# Patient Record
Sex: FEMALE | Race: WHITE | Employment: FULL TIME | URBAN - METROPOLITAN AREA
[De-identification: names, ages, dates, MRNs, and addresses within clinical notes are randomized per-mention and may not be internally consistent; named-entity substitution may affect disease eponyms.]

---

## 2017-03-23 DIAGNOSIS — J01.00 ACUTE MAXILLARY SINUSITIS, RECURRENCE NOT SPECIFIED: ICD-10-CM

## 2017-03-24 RX ORDER — FLUTICASONE PROPIONATE 50 MCG
SPRAY, SUSPENSION (ML) NASAL
Qty: 16 G | Refills: 5 | Status: SHIPPED | OUTPATIENT
Start: 2017-03-24 | End: 2017-12-26 | Stop reason: SDUPTHER

## 2017-05-02 ENCOUNTER — OFFICE VISIT (OUTPATIENT)
Dept: FAMILY MEDICINE CLINIC | Age: 42
End: 2017-05-02

## 2017-05-02 VITALS
DIASTOLIC BLOOD PRESSURE: 70 MMHG | RESPIRATION RATE: 16 BRPM | HEART RATE: 80 BPM | HEIGHT: 63 IN | SYSTOLIC BLOOD PRESSURE: 112 MMHG | BODY MASS INDEX: 29.84 KG/M2 | WEIGHT: 168.4 LBS

## 2017-05-02 DIAGNOSIS — J45.20 MILD INTERMITTENT ASTHMA WITHOUT COMPLICATION: ICD-10-CM

## 2017-05-02 DIAGNOSIS — J30.1 SEASONAL ALLERGIC RHINITIS DUE TO POLLEN: ICD-10-CM

## 2017-05-02 DIAGNOSIS — F41.0 PANIC ATTACKS: ICD-10-CM

## 2017-05-02 DIAGNOSIS — L30.9 ECZEMA, UNSPECIFIED TYPE: Primary | ICD-10-CM

## 2017-05-02 PROCEDURE — 99213 OFFICE O/P EST LOW 20 MIN: CPT | Performed by: FAMILY MEDICINE

## 2017-05-02 RX ORDER — CLOBETASOL PROPIONATE 0.5 MG/G
CREAM TOPICAL
Qty: 30 G | Refills: 1 | Status: SHIPPED | OUTPATIENT
Start: 2017-05-02 | End: 2018-05-21 | Stop reason: ALTCHOICE

## 2017-05-02 ASSESSMENT — ENCOUNTER SYMPTOMS
COUGH: 0
ABDOMINAL PAIN: 0
CHEST TIGHTNESS: 0
WHEEZING: 0
NAUSEA: 0
CONSTIPATION: 0
SORE THROAT: 0
SHORTNESS OF BREATH: 0
EYE PAIN: 0

## 2017-08-16 ENCOUNTER — TELEPHONE (OUTPATIENT)
Dept: FAMILY MEDICINE CLINIC | Age: 42
End: 2017-08-16

## 2017-08-16 RX ORDER — AZITHROMYCIN 250 MG/1
TABLET, FILM COATED ORAL
Qty: 1 PACKET | Refills: 0 | Status: SHIPPED | OUTPATIENT
Start: 2017-08-16 | End: 2017-08-26

## 2017-10-13 ENCOUNTER — NURSE ONLY (OUTPATIENT)
Dept: FAMILY MEDICINE CLINIC | Age: 42
End: 2017-10-13

## 2017-10-13 DIAGNOSIS — Z23 NEED FOR IMMUNIZATION AGAINST INFLUENZA: Primary | ICD-10-CM

## 2017-10-13 PROCEDURE — 90674 CCIIV4 VAC NO PRSV 0.5 ML IM: CPT | Performed by: FAMILY MEDICINE

## 2017-10-13 PROCEDURE — 90471 IMMUNIZATION ADMIN: CPT | Performed by: FAMILY MEDICINE

## 2017-10-25 ENCOUNTER — OFFICE VISIT (OUTPATIENT)
Dept: FAMILY MEDICINE CLINIC | Age: 42
End: 2017-10-25

## 2017-10-25 VITALS
SYSTOLIC BLOOD PRESSURE: 122 MMHG | HEIGHT: 63 IN | HEART RATE: 70 BPM | RESPIRATION RATE: 14 BRPM | DIASTOLIC BLOOD PRESSURE: 72 MMHG | WEIGHT: 176 LBS | BODY MASS INDEX: 31.18 KG/M2

## 2017-10-25 DIAGNOSIS — J01.00 ACUTE NON-RECURRENT MAXILLARY SINUSITIS: Primary | ICD-10-CM

## 2017-10-25 DIAGNOSIS — F41.0 PANIC ATTACKS: ICD-10-CM

## 2017-10-25 DIAGNOSIS — R35.0 URINARY FREQUENCY: ICD-10-CM

## 2017-10-25 DIAGNOSIS — N30.00 ACUTE CYSTITIS WITHOUT HEMATURIA: ICD-10-CM

## 2017-10-25 LAB
BACTERIA URINE, POC: NORMAL
BILIRUBIN URINE: 0 MG/DL
BLOOD, URINE: NEGATIVE
CASTS URINE, POC: NORMAL
CLARITY: CLEAR
COLOR: YELLOW
CRYSTALS URINE, POC: NORMAL
EPI CELLS URINE, POC: NORMAL
GLUCOSE URINE: NEGATIVE
KETONES, URINE: NEGATIVE
LEUKOCYTE EST, POC: NEGATIVE
NITRITE, URINE: NEGATIVE
PH UA: 6 (ref 4.5–8)
PROTEIN UA: NEGATIVE
RBC URINE, POC: NORMAL
SPECIFIC GRAVITY UA: 1.01 (ref 1–1.03)
UROBILINOGEN, URINE: NORMAL
WBC URINE, POC: NORMAL
YEAST URINE, POC: NORMAL

## 2017-10-25 PROCEDURE — 99213 OFFICE O/P EST LOW 20 MIN: CPT | Performed by: FAMILY MEDICINE

## 2017-10-25 PROCEDURE — 81000 URINALYSIS NONAUTO W/SCOPE: CPT | Performed by: FAMILY MEDICINE

## 2017-10-25 RX ORDER — ALPRAZOLAM 0.5 MG/1
TABLET ORAL
Qty: 10 TABLET | Refills: 0 | Status: SHIPPED | OUTPATIENT
Start: 2017-10-25 | End: 2018-11-20 | Stop reason: SDUPTHER

## 2017-10-25 RX ORDER — AMOXICILLIN AND CLAVULANATE POTASSIUM 875; 125 MG/1; MG/1
1 TABLET, FILM COATED ORAL 2 TIMES DAILY
Qty: 20 TABLET | Refills: 0 | Status: SHIPPED | OUTPATIENT
Start: 2017-10-25 | End: 2020-02-03 | Stop reason: SDUPTHER

## 2017-10-25 ASSESSMENT — ENCOUNTER SYMPTOMS
COUGH: 0
NAUSEA: 0
WHEEZING: 0
CONSTIPATION: 0
SINUS PRESSURE: 1
CHEST TIGHTNESS: 0
ABDOMINAL PAIN: 0
SORE THROAT: 0
EYE PAIN: 0
SHORTNESS OF BREATH: 0

## 2017-10-25 ASSESSMENT — PATIENT HEALTH QUESTIONNAIRE - PHQ9
SUM OF ALL RESPONSES TO PHQ9 QUESTIONS 1 & 2: 0
1. LITTLE INTEREST OR PLEASURE IN DOING THINGS: 0
SUM OF ALL RESPONSES TO PHQ QUESTIONS 1-9: 0
2. FEELING DOWN, DEPRESSED OR HOPELESS: 0

## 2017-10-25 NOTE — PROGRESS NOTES
Subjective:      Patient ID: Lidya Walters is a 36 y.o. female. uti   Of  ? And  Had  Leukocytes       panic  Attacks   stable       Sinusitis   This is a new problem. The current episode started in the past 7 days. There has been no fever. Associated symptoms include congestion and sinus pressure. Pertinent negatives include no coughing, ear pain, headaches, neck pain, shortness of breath or sore throat. (   Right  Max  Area  With pain) Past treatments include oral decongestants. The treatment provided mild relief. Past Medical History:   Diagnosis Date    Asthma     Panic attacks 4/98    Seasonal allergies 4/99     Review of Systems   Constitutional: Negative for appetite change, fatigue and fever. HENT: Positive for congestion and sinus pressure. Negative for ear pain, postnasal drip and sore throat. Eyes: Negative for pain and visual disturbance. Respiratory: Negative for cough, chest tightness, shortness of breath and wheezing. Cardiovascular: Negative for chest pain, palpitations and leg swelling. Gastrointestinal: Negative for abdominal pain, constipation and nausea. Genitourinary: Negative for dysuria and frequency. Musculoskeletal: Negative for arthralgias, joint swelling, neck pain and neck stiffness. Skin: Negative for rash. Neurological: Negative for dizziness, weakness, numbness and headaches. Hematological: Negative for adenopathy. Does not bruise/bleed easily. Psychiatric/Behavioral: Negative for behavioral problems and sleep disturbance. The patient is not nervous/anxious. /72 (Site: Right Arm, Position: Sitting, Cuff Size: Medium Adult)   Pulse 70   Resp 14   Ht 5' 3\" (1.6 m)   Wt 176 lb (79.8 kg)   Breastfeeding? No   BMI 31.18 kg/m²   Objective:   Physical Exam   Constitutional: She is oriented to person, place, and time. She appears well-developed and well-nourished. HENT:   Head: Normocephalic and atraumatic.    Right Ear: External ear Dizziness 30 tablet 1    Loratadine (CLARITIN) 10 MG CAPS Take  by mouth daily.  Pseudoephedrine HCl (SUDAFED 12 HOUR PO) Take  by mouth. No current facility-administered medications for this visit.             See if  Persist and  Urine     Will  Cover and  try  mucinex         See in  6 mths    Try  zoloft   25 mg    For  Panic  Attacks and  Xanax  prn

## 2017-11-28 DIAGNOSIS — F41.0 PANIC ATTACKS: ICD-10-CM

## 2017-11-29 NOTE — TELEPHONE ENCOUNTER
Date of last visit:  10/25/2017  Date of next visit:  Visit date not found    Requested Prescriptions     Pending Prescriptions Disp Refills    sertraline (ZOLOFT) 50 MG tablet [Pharmacy Med Name: SERTRALINE 50MG TAB] 90 tablet 1     Sig: TAKE ONE TABLET BY MOUTH ONCE DAILY

## 2017-12-26 ENCOUNTER — HOSPITAL ENCOUNTER (OUTPATIENT)
Dept: WOMENS IMAGING | Age: 42
Discharge: HOME | End: 2017-12-26
Payer: COMMERCIAL

## 2017-12-26 DIAGNOSIS — J01.00 ACUTE MAXILLARY SINUSITIS, RECURRENCE NOT SPECIFIED: ICD-10-CM

## 2017-12-26 DIAGNOSIS — Z12.31 VISIT FOR SCREENING MAMMOGRAM: ICD-10-CM

## 2017-12-26 PROCEDURE — G0202 SCR MAMMO BI INCL CAD: HCPCS

## 2017-12-26 NOTE — TELEPHONE ENCOUNTER
From: Lorenzo Quintero  Sent: 12/25/2017 8:49 PM EST  Subject: Medication Renewal Request    Lashae Masterson would like a refill of the following medications:  fluticasone (FLONASE) 50 MCG/ACT nasal spray Jerry Flaherty MD]    Preferred pharmacy: [facility] [address], [address] [phone] - F [phone]    Comment:

## 2017-12-27 RX ORDER — FLUTICASONE PROPIONATE 50 MCG
SPRAY, SUSPENSION (ML) NASAL
Qty: 16 G | Refills: 5 | Status: SHIPPED | OUTPATIENT
Start: 2017-12-27 | End: 2018-11-20 | Stop reason: SDUPTHER

## 2018-05-21 ENCOUNTER — OFFICE VISIT (OUTPATIENT)
Dept: FAMILY MEDICINE CLINIC | Age: 43
End: 2018-05-21

## 2018-05-21 VITALS
WEIGHT: 165 LBS | SYSTOLIC BLOOD PRESSURE: 120 MMHG | RESPIRATION RATE: 14 BRPM | HEIGHT: 63 IN | DIASTOLIC BLOOD PRESSURE: 70 MMHG | BODY MASS INDEX: 29.23 KG/M2 | HEART RATE: 68 BPM

## 2018-05-21 DIAGNOSIS — J01.21 ACUTE RECURRENT ETHMOIDAL SINUSITIS: Primary | ICD-10-CM

## 2018-05-21 PROCEDURE — 99213 OFFICE O/P EST LOW 20 MIN: CPT | Performed by: FAMILY MEDICINE

## 2018-05-21 RX ORDER — AMOXICILLIN AND CLAVULANATE POTASSIUM 875; 125 MG/1; MG/1
1 TABLET, FILM COATED ORAL 2 TIMES DAILY
Qty: 20 TABLET | Refills: 0 | Status: SHIPPED | OUTPATIENT
Start: 2018-05-21 | End: 2018-06-01 | Stop reason: SDUPTHER

## 2018-05-21 ASSESSMENT — ENCOUNTER SYMPTOMS
CONSTIPATION: 0
SINUS PRESSURE: 1
ABDOMINAL PAIN: 0
EYE PAIN: 0
HOARSE VOICE: 1
NAUSEA: 0
SORE THROAT: 1
CHEST TIGHTNESS: 0
COUGH: 1
SHORTNESS OF BREATH: 0
WHEEZING: 0

## 2018-06-01 ENCOUNTER — TELEPHONE (OUTPATIENT)
Dept: FAMILY MEDICINE CLINIC | Age: 43
End: 2018-06-01

## 2018-06-01 DIAGNOSIS — J01.21 ACUTE RECURRENT ETHMOIDAL SINUSITIS: ICD-10-CM

## 2018-06-01 RX ORDER — AMOXICILLIN AND CLAVULANATE POTASSIUM 875; 125 MG/1; MG/1
1 TABLET, FILM COATED ORAL 2 TIMES DAILY
Qty: 14 TABLET | Refills: 0 | Status: SHIPPED | OUTPATIENT
Start: 2018-06-01 | End: 2018-06-08

## 2018-08-15 DIAGNOSIS — F41.0 PANIC ATTACKS: ICD-10-CM

## 2018-11-20 ENCOUNTER — OFFICE VISIT (OUTPATIENT)
Dept: FAMILY MEDICINE CLINIC | Age: 43
End: 2018-11-20

## 2018-11-20 VITALS
SYSTOLIC BLOOD PRESSURE: 118 MMHG | HEART RATE: 72 BPM | RESPIRATION RATE: 12 BRPM | HEIGHT: 63 IN | WEIGHT: 179.8 LBS | DIASTOLIC BLOOD PRESSURE: 74 MMHG | BODY MASS INDEX: 31.86 KG/M2

## 2018-11-20 DIAGNOSIS — J01.00 ACUTE MAXILLARY SINUSITIS, RECURRENCE NOT SPECIFIED: ICD-10-CM

## 2018-11-20 DIAGNOSIS — F41.0 PANIC ATTACKS: ICD-10-CM

## 2018-11-20 DIAGNOSIS — J30.2 SEASONAL ALLERGIES: Primary | ICD-10-CM

## 2018-11-20 PROCEDURE — 99213 OFFICE O/P EST LOW 20 MIN: CPT | Performed by: FAMILY MEDICINE

## 2018-11-20 RX ORDER — FLUOXETINE 10 MG/1
10 CAPSULE ORAL DAILY
Qty: 30 CAPSULE | Refills: 1 | Status: SHIPPED | OUTPATIENT
Start: 2018-11-20 | End: 2019-04-08 | Stop reason: SDUPTHER

## 2018-11-20 RX ORDER — PREDNISONE 20 MG/1
TABLET ORAL
Qty: 21 TABLET | Refills: 0 | Status: SHIPPED | OUTPATIENT
Start: 2018-11-20 | End: 2019-05-20 | Stop reason: ALTCHOICE

## 2018-11-20 RX ORDER — FLUTICASONE PROPIONATE 50 MCG
SPRAY, SUSPENSION (ML) NASAL
Qty: 16 G | Refills: 5 | Status: SHIPPED | OUTPATIENT
Start: 2018-11-20 | End: 2019-11-27 | Stop reason: SDUPTHER

## 2018-11-20 RX ORDER — ALPRAZOLAM 0.5 MG/1
TABLET ORAL
Qty: 10 TABLET | Refills: 0 | Status: SHIPPED | OUTPATIENT
Start: 2018-11-20 | End: 2019-11-19 | Stop reason: SDUPTHER

## 2018-11-20 ASSESSMENT — ENCOUNTER SYMPTOMS
CHEST TIGHTNESS: 0
ABDOMINAL PAIN: 0
COUGH: 1
SORE THROAT: 1
SINUS PRESSURE: 1
EYE PAIN: 0
WHEEZING: 0
SHORTNESS OF BREATH: 0
CONSTIPATION: 0
NAUSEA: 0

## 2018-11-20 ASSESSMENT — PATIENT HEALTH QUESTIONNAIRE - PHQ9
2. FEELING DOWN, DEPRESSED OR HOPELESS: 0
1. LITTLE INTEREST OR PLEASURE IN DOING THINGS: 0
SUM OF ALL RESPONSES TO PHQ QUESTIONS 1-9: 0
SUM OF ALL RESPONSES TO PHQ QUESTIONS 1-9: 0
SUM OF ALL RESPONSES TO PHQ9 QUESTIONS 1 & 2: 0

## 2019-01-04 DIAGNOSIS — F41.0 PANIC ATTACKS: ICD-10-CM

## 2019-03-29 ENCOUNTER — HOSPITAL ENCOUNTER (OUTPATIENT)
Dept: WOMENS IMAGING | Age: 44
Discharge: HOME | End: 2019-03-29
Payer: COMMERCIAL

## 2019-03-29 DIAGNOSIS — Z12.31 VISIT FOR SCREENING MAMMOGRAM: ICD-10-CM

## 2019-03-29 PROCEDURE — 77063 BREAST TOMOSYNTHESIS BI: CPT

## 2019-04-08 DIAGNOSIS — F41.0 PANIC ATTACKS: ICD-10-CM

## 2019-04-08 NOTE — TELEPHONE ENCOUNTER
Date of last visit:  11/20/2018  Date of next visit:  Visit date not found    Requested Prescriptions     Pending Prescriptions Disp Refills    FLUoxetine (PROZAC) 10 MG capsule [Pharmacy Med Name: FLUoxetine HCl Oral Capsule 10 MG] 30 capsule 0     Sig: TAKE 1 CAPSULE BY MOUTH ONE TIME A DAY

## 2019-04-09 RX ORDER — FLUOXETINE 10 MG/1
CAPSULE ORAL
Qty: 30 CAPSULE | Refills: 2 | Status: SHIPPED | OUTPATIENT
Start: 2019-04-09 | End: 2019-05-20 | Stop reason: ALTCHOICE

## 2019-05-20 ENCOUNTER — OFFICE VISIT (OUTPATIENT)
Dept: FAMILY MEDICINE CLINIC | Age: 44
End: 2019-05-20

## 2019-05-20 VITALS
BODY MASS INDEX: 32.43 KG/M2 | HEART RATE: 76 BPM | SYSTOLIC BLOOD PRESSURE: 112 MMHG | WEIGHT: 183 LBS | DIASTOLIC BLOOD PRESSURE: 74 MMHG | HEIGHT: 63 IN | RESPIRATION RATE: 14 BRPM

## 2019-05-20 DIAGNOSIS — J01.20 ACUTE NON-RECURRENT ETHMOIDAL SINUSITIS: Primary | ICD-10-CM

## 2019-05-20 DIAGNOSIS — F41.0 PANIC ATTACKS: ICD-10-CM

## 2019-05-20 DIAGNOSIS — R42 VERTIGO: ICD-10-CM

## 2019-05-20 PROCEDURE — 99213 OFFICE O/P EST LOW 20 MIN: CPT | Performed by: FAMILY MEDICINE

## 2019-05-20 RX ORDER — AMOXICILLIN AND CLAVULANATE POTASSIUM 875; 125 MG/1; MG/1
1 TABLET, FILM COATED ORAL 2 TIMES DAILY
Qty: 20 TABLET | Refills: 0 | Status: SHIPPED | OUTPATIENT
Start: 2019-05-20 | End: 2019-05-30

## 2019-05-20 RX ORDER — MECLIZINE HYDROCHLORIDE 25 MG/1
25 TABLET ORAL 4 TIMES DAILY PRN
Qty: 30 TABLET | Refills: 1 | Status: SHIPPED | OUTPATIENT
Start: 2019-05-20 | End: 2020-08-26 | Stop reason: SDUPTHER

## 2019-05-20 RX ORDER — ESCITALOPRAM OXALATE 10 MG/1
5 TABLET ORAL DAILY
Qty: 15 TABLET | Refills: 3 | Status: SHIPPED | OUTPATIENT
Start: 2019-05-20 | End: 2019-07-31 | Stop reason: ALTCHOICE

## 2019-05-20 ASSESSMENT — ENCOUNTER SYMPTOMS
CONSTIPATION: 0
NAUSEA: 0
CHEST TIGHTNESS: 0
WHEEZING: 0
EYE PAIN: 0
SHORTNESS OF BREATH: 0
COUGH: 1
SINUS PRESSURE: 1
ABDOMINAL PAIN: 0
SORE THROAT: 1

## 2019-05-20 ASSESSMENT — PATIENT HEALTH QUESTIONNAIRE - PHQ9
2. FEELING DOWN, DEPRESSED OR HOPELESS: 0
1. LITTLE INTEREST OR PLEASURE IN DOING THINGS: 0
SUM OF ALL RESPONSES TO PHQ QUESTIONS 1-9: 0
SUM OF ALL RESPONSES TO PHQ9 QUESTIONS 1 & 2: 0
SUM OF ALL RESPONSES TO PHQ QUESTIONS 1-9: 0

## 2019-05-20 NOTE — PROGRESS NOTES
Subjective:      Patient ID: Anatoly Babb is a 38 y.o. female. Back  To the  zoloft and  prozac  Noted   josie e  Worse  ?  lexapro     Sinusitis   This is a new problem. The current episode started in the past 7 days. The problem has been gradually worsening since onset. There has been no fever. The fever has been present for 3 to 4 days. Associated symptoms include congestion, coughing, ear pain, headaches, sinus pressure and a sore throat. Pertinent negatives include no neck pain or shortness of breath. Past Medical History:   Diagnosis Date    Asthma     Panic attacks 4/98    Seasonal allergies 4/99     Review of Systems   Constitutional: Negative for appetite change, fatigue and fever. HENT: Positive for congestion, ear pain, sinus pressure and sore throat. Negative for postnasal drip. Eyes: Negative for pain and visual disturbance. Respiratory: Positive for cough. Negative for chest tightness, shortness of breath and wheezing. Cardiovascular: Negative for chest pain, palpitations and leg swelling. Gastrointestinal: Negative for abdominal pain, constipation and nausea. Genitourinary: Negative for dysuria and frequency. Musculoskeletal: Negative for arthralgias, joint swelling, neck pain and neck stiffness. Skin: Negative for rash. Neurological: Positive for headaches. Negative for dizziness, weakness and numbness. Hematological: Negative for adenopathy. Does not bruise/bleed easily. Psychiatric/Behavioral: Negative for behavioral problems and sleep disturbance. The patient is not nervous/anxious. /74 (Site: Right Upper Arm, Position: Sitting, Cuff Size: Medium Adult)   Pulse 76   Resp 14   Ht 5' 3\" (1.6 m)   Wt 183 lb (83 kg)   BMI 32.42 kg/m²   Objective:   Physical Exam   Constitutional: She is oriented to person, place, and time. She appears well-developed and well-nourished. HENT:   Head: Normocephalic and atraumatic.    Right Ear: External ear normal.   Left Ear: External ear normal.   Nose: Nose normal.   Mouth/Throat: Oropharynx is clear and moist.   Eyes: Pupils are equal, round, and reactive to light. Conjunctivae and EOM are normal. No scleral icterus. Neck: Normal range of motion. Neck supple. No JVD present. No thyromegaly present. Cardiovascular: Normal rate, regular rhythm, normal heart sounds and intact distal pulses. Pulmonary/Chest: Effort normal and breath sounds normal. She has no wheezes. She has no rales. Abdominal: Soft. Bowel sounds are normal. She exhibits no distension and no mass. There is no tenderness. Musculoskeletal: Normal range of motion. She exhibits no tenderness. Lymphadenopathy:     She has no cervical adenopathy. Neurological: She is alert and oriented to person, place, and time. She has normal reflexes. No cranial nerve deficit. Skin: Skin is warm and dry. No rash noted. Psychiatric: She has a normal mood and affect. Nursing note and vitals reviewed. Assessment:           Diagnosis Orders   1. Acute non-recurrent ethmoidal sinusitis  amoxicillin-clavulanate (AUGMENTIN) 875-125 MG per tablet   2. Panic attacks  escitalopram (LEXAPRO) 10 MG tablet      Plan:      Current Outpatient Medications   Medication Sig Dispense Refill    amoxicillin-clavulanate (AUGMENTIN) 875-125 MG per tablet Take 1 tablet by mouth 2 times daily for 10 days 20 tablet 0    escitalopram (LEXAPRO) 10 MG tablet Take 0.5 tablets by mouth daily 15 tablet 3    fluticasone (FLONASE) 50 MCG/ACT nasal spray USE ONE SPRAY(S) IN EACH NOSTRIL ONCE DAILY 16 g 5    meclizine (ANTIVERT) 25 MG tablet Take 1 tablet by mouth 4 times daily as needed for Dizziness 30 tablet 1    Loratadine (CLARITIN) 10 MG CAPS Take  by mouth daily.  Pseudoephedrine HCl (SUDAFED 12 HOUR PO) Take  by mouth. No current facility-administered medications for this visit. No orders of the defined types were placed in this encounter.     See in 3 mtha  And    Try lexapro  5 mg     Aletha Solomon MD

## 2019-07-30 DIAGNOSIS — F41.0 PANIC ATTACKS: ICD-10-CM

## 2019-10-15 ENCOUNTER — OFFICE VISIT (OUTPATIENT)
Dept: FAMILY MEDICINE CLINIC | Age: 44
End: 2019-10-15

## 2019-10-15 VITALS
HEART RATE: 76 BPM | HEIGHT: 63 IN | SYSTOLIC BLOOD PRESSURE: 136 MMHG | WEIGHT: 183.25 LBS | BODY MASS INDEX: 32.47 KG/M2 | DIASTOLIC BLOOD PRESSURE: 74 MMHG | RESPIRATION RATE: 14 BRPM

## 2019-10-15 DIAGNOSIS — J01.20 ACUTE NON-RECURRENT ETHMOIDAL SINUSITIS: Primary | ICD-10-CM

## 2019-10-15 PROCEDURE — 99213 OFFICE O/P EST LOW 20 MIN: CPT | Performed by: FAMILY MEDICINE

## 2019-10-15 RX ORDER — CETIRIZINE HYDROCHLORIDE 10 MG/1
10 TABLET ORAL DAILY
COMMUNITY
End: 2020-11-04

## 2019-10-15 RX ORDER — AMOXICILLIN AND CLAVULANATE POTASSIUM 875; 125 MG/1; MG/1
1 TABLET, FILM COATED ORAL 2 TIMES DAILY
Qty: 30 TABLET | Refills: 0 | Status: SHIPPED | OUTPATIENT
Start: 2019-10-15 | End: 2019-10-30

## 2019-10-15 ASSESSMENT — ENCOUNTER SYMPTOMS
SORE THROAT: 0
ABDOMINAL PAIN: 0
COUGH: 1
CONSTIPATION: 0
WHEEZING: 0
HOARSE VOICE: 1
EYE PAIN: 0
CHEST TIGHTNESS: 0
NAUSEA: 0
SHORTNESS OF BREATH: 0
SINUS PRESSURE: 1

## 2019-10-29 ENCOUNTER — NURSE ONLY (OUTPATIENT)
Dept: FAMILY MEDICINE CLINIC | Age: 44
End: 2019-10-29

## 2019-10-29 DIAGNOSIS — Z23 NEED FOR INFLUENZA VACCINATION: Primary | ICD-10-CM

## 2019-10-29 PROCEDURE — 90756 CCIIV4 VACC ABX FREE IM: CPT | Performed by: FAMILY MEDICINE

## 2019-10-29 PROCEDURE — 90471 IMMUNIZATION ADMIN: CPT | Performed by: FAMILY MEDICINE

## 2019-11-11 DIAGNOSIS — F41.0 PANIC ATTACKS: ICD-10-CM

## 2019-11-18 ENCOUNTER — PATIENT MESSAGE (OUTPATIENT)
Dept: FAMILY MEDICINE CLINIC | Age: 44
End: 2019-11-18

## 2019-11-19 ENCOUNTER — TELEPHONE (OUTPATIENT)
Dept: FAMILY MEDICINE CLINIC | Age: 44
End: 2019-11-19

## 2019-11-19 DIAGNOSIS — F41.0 PANIC ATTACKS: ICD-10-CM

## 2019-11-19 RX ORDER — ALPRAZOLAM 0.5 MG/1
TABLET ORAL
Qty: 15 TABLET | Refills: 0 | Status: SHIPPED | OUTPATIENT
Start: 2019-11-19 | End: 2019-11-19

## 2019-11-27 DIAGNOSIS — J01.00 ACUTE MAXILLARY SINUSITIS, RECURRENCE NOT SPECIFIED: ICD-10-CM

## 2019-11-29 RX ORDER — FLUTICASONE PROPIONATE 50 MCG
SPRAY, SUSPENSION (ML) NASAL
Qty: 16 G | Refills: 5 | Status: SHIPPED | OUTPATIENT
Start: 2019-11-29 | End: 2021-07-15

## 2020-01-08 ENCOUNTER — TELEPHONE (OUTPATIENT)
Dept: FAMILY MEDICINE CLINIC | Age: 45
End: 2020-01-08

## 2020-01-08 NOTE — TELEPHONE ENCOUNTER
Pt called said that the she has seen you for vertigo before. Mary Ann Herbertg that it normally comes and goes but today before she even got out of bed. Doesn't seem to be ending. Wants referral to [facility]. She didn't want to schedule an appt if possible. Said she checked with insurance and they are covered.     Pt can be reached at [phone]

## 2020-01-10 NOTE — TELEPHONE ENCOUNTER
Referral with records faxed to [facility], [place] @ #: [phone]. Lashae informed by Phone.

## 2020-01-10 NOTE — TELEPHONE ENCOUNTER
Pt called stating that they are needing this referral TODAY if possible as Lincoln Thomas's office held an appointment for her for Monday(01/13/20) and they are needing the referral prior to appointment. Please call pt to let her know if able to get done today.

## 2020-01-30 ENCOUNTER — TELEPHONE (OUTPATIENT)
Dept: FAMILY MEDICINE CLINIC | Age: 45
End: 2020-01-30

## 2020-01-30 RX ORDER — AZITHROMYCIN 250 MG/1
TABLET, FILM COATED ORAL
Qty: 1 PACKET | Refills: 0 | Status: SHIPPED | OUTPATIENT
Start: 2020-01-30 | End: 2020-08-26 | Stop reason: ALTCHOICE

## 2020-01-30 NOTE — TELEPHONE ENCOUNTER
Date of last visit:  10/15/2019  Date of next visit:  Visit date not found    Requested Prescriptions     Signed Prescriptions Disp Refills    azithromycin (ZITHROMAX) 250 MG tablet 1 packet 0     Sig: Take 2 tabs (500 mg) on Day 1, and take 1 tab (250 mg) on days 2 through 5.      Authorizing Provider: Juan Carlos Ryan     Ordering User: Nir Bowman

## 2020-02-03 ENCOUNTER — OFFICE VISIT (OUTPATIENT)
Dept: FAMILY MEDICINE CLINIC | Age: 45
End: 2020-02-03

## 2020-02-03 VITALS
DIASTOLIC BLOOD PRESSURE: 74 MMHG | HEART RATE: 76 BPM | WEIGHT: 179.13 LBS | HEIGHT: 63 IN | RESPIRATION RATE: 14 BRPM | BODY MASS INDEX: 31.74 KG/M2 | SYSTOLIC BLOOD PRESSURE: 126 MMHG

## 2020-02-03 PROCEDURE — 99213 OFFICE O/P EST LOW 20 MIN: CPT | Performed by: FAMILY MEDICINE

## 2020-02-03 RX ORDER — AMOXICILLIN AND CLAVULANATE POTASSIUM 875; 125 MG/1; MG/1
1 TABLET, FILM COATED ORAL 2 TIMES DAILY
Qty: 20 TABLET | Refills: 0 | Status: SHIPPED | OUTPATIENT
Start: 2020-02-03 | End: 2020-08-26 | Stop reason: SDUPTHER

## 2020-02-03 RX ORDER — ALBUTEROL SULFATE 2.5 MG/3ML
2.5 SOLUTION RESPIRATORY (INHALATION) 4 TIMES DAILY
Qty: 1 PACKAGE | Refills: 3 | Status: SHIPPED | OUTPATIENT
Start: 2020-02-03 | End: 2021-05-10

## 2020-02-03 RX ORDER — PREDNISONE 20 MG/1
20 TABLET ORAL 2 TIMES DAILY
Qty: 14 TABLET | Refills: 0 | Status: SHIPPED | OUTPATIENT
Start: 2020-02-03 | End: 2020-02-10

## 2020-02-03 RX ORDER — ALBUTEROL SULFATE 90 UG/1
2 AEROSOL, METERED RESPIRATORY (INHALATION) EVERY 6 HOURS PRN
Qty: 1 INHALER | Refills: 3 | Status: SHIPPED | OUTPATIENT
Start: 2020-02-03 | End: 2021-05-10

## 2020-02-03 ASSESSMENT — ENCOUNTER SYMPTOMS
NAUSEA: 0
SORE THROAT: 0
CONSTIPATION: 0
SHORTNESS OF BREATH: 1
CHEST TIGHTNESS: 0
EYE PAIN: 0
ABDOMINAL PAIN: 0
WHEEZING: 0
COUGH: 1

## 2020-02-03 ASSESSMENT — PATIENT HEALTH QUESTIONNAIRE - PHQ9
SUM OF ALL RESPONSES TO PHQ9 QUESTIONS 1 & 2: 0
SUM OF ALL RESPONSES TO PHQ QUESTIONS 1-9: 0
2. FEELING DOWN, DEPRESSED OR HOPELESS: 0
SUM OF ALL RESPONSES TO PHQ QUESTIONS 1-9: 0
1. LITTLE INTEREST OR PLEASURE IN DOING THINGS: 0

## 2020-02-03 NOTE — LETTER
[facility]  [address] [address]  Phone: [phone]  Fax: [phone]    Jaimee Martínez MD        February 3, 2020     Patient: Karishma Valencia   YOB: 1975   Date of Visit: 2/3/2020       To Whom It May Concern: It is my medical opinion that Maura Victor may return to work on 2-5-20 as  off  2-3 and 2-4-20. If you have any questions or concerns, please don't hesitate to call.     Sincerely,        Jaimee Martínez MD

## 2020-02-03 NOTE — PROGRESS NOTES
Subjective:      Patient ID: Kassie Scott is a 38 y.o. female. Call in  zpak and  No   Improvement     URI    This is a new problem. The current episode started in the past 7 days. The problem has been resolved. The maximum temperature recorded prior to her arrival was 100.4 - 100.9 F. The fever has been present for less than 1 day. Associated symptoms include congestion and coughing. Pertinent negatives include no abdominal pain, chest pain, dysuria, ear pain, headaches, nausea, neck pain, rash, sore throat or wheezing. She has tried decongestant for the symptoms. The treatment provided mild relief. Cough   This is a recurrent problem. The current episode started in the past 7 days. The problem has been gradually worsening. The problem occurs constantly. The cough is non-productive. Associated symptoms include ear congestion, postnasal drip and shortness of breath. Pertinent negatives include no chest pain, ear pain, fever, headaches, rash, sore throat or wheezing. She has tried OTC cough suppressant for the symptoms. The treatment provided mild relief. Past Medical History:   Diagnosis Date    Asthma     Panic attacks 4/98    Seasonal allergies 4/99     Review of Systems   Constitutional: Negative for appetite change, fatigue and fever. HENT: Positive for congestion and postnasal drip. Negative for ear pain and sore throat. Eyes: Negative for pain and visual disturbance. Respiratory: Positive for cough and shortness of breath. Negative for chest tightness and wheezing. Cardiovascular: Negative for chest pain, palpitations and leg swelling. Gastrointestinal: Negative for abdominal pain, constipation and nausea. Genitourinary: Negative for dysuria and frequency. Musculoskeletal: Negative for arthralgias, joint swelling, neck pain and neck stiffness. Skin: Negative for rash. Neurological: Negative for dizziness, weakness, numbness and headaches.    Hematological: Negative for adenopathy. Does not bruise/bleed easily. Psychiatric/Behavioral: Negative for behavioral problems and sleep disturbance. The patient is not nervous/anxious. /74 (Site: Right Upper Arm, Position: Sitting, Cuff Size: Medium Adult)   Pulse 76   Resp 14   Ht 5' 3\" (1.6 m)   Wt 179 lb 2 oz (81.3 kg)   BMI 31.73 kg/m²   Objective:   Physical Exam  Vitals signs and nursing note reviewed. Constitutional:       Appearance: She is well-developed. HENT:      Head: Normocephalic and atraumatic. Right Ear: External ear normal.      Left Ear: External ear normal.      Nose: Nose normal.   Eyes:      General: No scleral icterus. Conjunctiva/sclera: Conjunctivae normal.      Pupils: Pupils are equal, round, and reactive to light. Neck:      Musculoskeletal: Normal range of motion and neck supple. Thyroid: No thyromegaly. Vascular: No JVD. Cardiovascular:      Rate and Rhythm: Normal rate and regular rhythm. Heart sounds: Normal heart sounds. Pulmonary:      Effort: Pulmonary effort is normal.      Breath sounds: Wheezing and rhonchi present. No rales. Comments:    worse  anteriorly  Abdominal:      General: Bowel sounds are normal. There is no distension. Palpations: Abdomen is soft. There is no mass. Tenderness: There is no abdominal tenderness. Musculoskeletal: Normal range of motion. General: No tenderness. Lymphadenopathy:      Cervical: No cervical adenopathy. Skin:     General: Skin is warm and dry. Findings: No rash. Neurological:      Mental Status: She is alert and oriented to person, place, and time. Cranial Nerves: No cranial nerve deficit. Deep Tendon Reflexes: Reflexes are normal and symmetric. Assessment:       Diagnosis Orders   1.  Acute bronchitis with bronchospasm  albuterol sulfate HFA (VENTOLIN HFA) 108 (90 Base) MCG/ACT inhaler    predniSONE (DELTASONE) 20 MG tablet    albuterol (PROVENTIL) (2.5 MG/3ML)

## 2020-02-10 NOTE — TELEPHONE ENCOUNTER
Lashae called requesting a refill of the below medication which has been pended for you:     Requested Prescriptions     Pending Prescriptions Disp Refills    sertraline (ZOLOFT) 50 MG tablet 90 tablet 0     Sig: TAKE ONE TABLET BY MOUTH ONCE DAILY       Last Appointment Date: 2/3/2020  Next Appointment Date: Visit date not found    Allergies   Allergen Reactions    Seasonal

## 2020-02-20 ENCOUNTER — TELEPHONE (OUTPATIENT)
Dept: FAMILY MEDICINE CLINIC | Age: 45
End: 2020-02-20

## 2020-02-20 RX ORDER — LEVOFLOXACIN 500 MG/1
500 TABLET, FILM COATED ORAL DAILY
Qty: 10 TABLET | Refills: 0 | Status: SHIPPED | OUTPATIENT
Start: 2020-02-20 | End: 2020-03-01

## 2020-02-20 NOTE — TELEPHONE ENCOUNTER
Date of last visit:  2/3/2020  Date of next visit:  Visit date not found    Requested Prescriptions     Signed Prescriptions Disp Refills    levoFLOXacin (LEVAQUIN) 500 MG tablet 10 tablet 0     Sig: Take 1 tablet by mouth daily for 10 days     Authorizing Provider: Nikita Trinidad     Ordering User: OPAL SANTIZO informed by Phone.

## 2020-04-28 NOTE — TELEPHONE ENCOUNTER
Date of last visit:  2/3/2020  Date of next visit:  Visit date not found    Requested Prescriptions     Pending Prescriptions Disp Refills    sertraline (ZOLOFT) 50 MG tablet [Pharmacy Med Name: Sertraline HCl Oral Tablet 50 MG] 90 tablet 0     Sig: TAKE 1 TABLET BY MOUTH ONE TIME A DAY

## 2020-05-13 ENCOUNTER — TELEPHONE (OUTPATIENT)
Dept: FAMILY MEDICINE CLINIC | Age: 45
End: 2020-05-13

## 2020-05-13 NOTE — TELEPHONE ENCOUNTER
Pt called stating that she feels that the claritin is not working for any longer. She is asking if she would be able to get Singular in place.      Send to Sim Lewis may be reached at [phone]

## 2020-05-15 RX ORDER — MONTELUKAST SODIUM 10 MG/1
10 TABLET ORAL NIGHTLY
Qty: 30 TABLET | Refills: 0 | Status: SHIPPED | OUTPATIENT
Start: 2020-05-15 | End: 2020-08-26 | Stop reason: ALTCHOICE

## 2020-08-26 ENCOUNTER — OFFICE VISIT (OUTPATIENT)
Dept: FAMILY MEDICINE CLINIC | Age: 45
End: 2020-08-26

## 2020-08-26 VITALS
BODY MASS INDEX: 32.14 KG/M2 | WEIGHT: 181.38 LBS | SYSTOLIC BLOOD PRESSURE: 122 MMHG | HEART RATE: 76 BPM | DIASTOLIC BLOOD PRESSURE: 72 MMHG | RESPIRATION RATE: 16 BRPM | TEMPERATURE: 97.6 F | HEIGHT: 63 IN

## 2020-08-26 PROCEDURE — 99213 OFFICE O/P EST LOW 20 MIN: CPT | Performed by: FAMILY MEDICINE

## 2020-08-26 RX ORDER — ALPRAZOLAM 0.5 MG/1
TABLET ORAL
Qty: 15 TABLET | Refills: 0 | Status: SHIPPED | OUTPATIENT
Start: 2020-08-26 | End: 2020-09-05

## 2020-08-26 RX ORDER — MECLIZINE HYDROCHLORIDE 25 MG/1
25 TABLET ORAL 4 TIMES DAILY PRN
Qty: 40 TABLET | Refills: 1 | Status: SHIPPED | OUTPATIENT
Start: 2020-08-26 | End: 2022-01-03 | Stop reason: SDUPTHER

## 2020-08-26 RX ORDER — AMOXICILLIN AND CLAVULANATE POTASSIUM 875; 125 MG/1; MG/1
1 TABLET, FILM COATED ORAL 2 TIMES DAILY
Qty: 20 TABLET | Refills: 0 | Status: SHIPPED | OUTPATIENT
Start: 2020-08-26 | End: 2020-09-05

## 2020-08-26 ASSESSMENT — ENCOUNTER SYMPTOMS
ABDOMINAL PAIN: 0
COUGH: 0
NAUSEA: 0
SINUS PRESSURE: 1
EYE PAIN: 0
SORE THROAT: 1
WHEEZING: 0
SHORTNESS OF BREATH: 0
SINUS COMPLAINT: 1
CONSTIPATION: 0
CHEST TIGHTNESS: 0

## 2020-09-21 ENCOUNTER — TELEPHONE (OUTPATIENT)
Dept: FAMILY MEDICINE CLINIC | Age: 45
End: 2020-09-21

## 2020-09-21 ENCOUNTER — HOSPITAL ENCOUNTER (OUTPATIENT)
Age: 45
Discharge: HOME | End: 2020-09-21
Payer: COMMERCIAL

## 2020-09-21 DIAGNOSIS — Z20.822 EXPOSURE TO COVID-19 VIRUS: ICD-10-CM

## 2020-09-21 PROCEDURE — 99211 OFF/OP EST MAY X REQ PHY/QHP: CPT

## 2020-09-21 PROCEDURE — U0003 INFECTIOUS AGENT DETECTION BY NUCLEIC ACID (DNA OR RNA); SEVERE ACUTE RESPIRATORY SYNDROME CORONAVIRUS 2 (SARS-COV-2) (CORONAVIRUS DISEASE [COVID-19]), AMPLIFIED PROBE TECHNIQUE, MAKING USE OF HIGH THROUGHPUT TECHNOLOGIES AS DESCRIBED BY CMS-2020-01-R: HCPCS

## 2020-09-21 NOTE — TELEPHONE ENCOUNTER
Lab ordered. Sent to [facility].     Pt informed via VM that lab was faxed

## 2020-09-23 LAB — SARS-COV-2: NOT DETECTED

## 2020-09-25 ENCOUNTER — TELEPHONE (OUTPATIENT)
Dept: FAMILY MEDICINE CLINIC | Age: 45
End: 2020-09-25

## 2020-09-25 NOTE — TELEPHONE ENCOUNTER
----- Message from Kalpana Jeronimo MD sent at 9/25/2020  6:58 AM EDT -----  Call test  Is  negative

## 2020-10-21 ENCOUNTER — NURSE ONLY (OUTPATIENT)
Dept: FAMILY MEDICINE CLINIC | Age: 45
End: 2020-10-21

## 2020-10-21 PROCEDURE — 90688 IIV4 VACCINE SPLT 0.5 ML IM: CPT | Performed by: FAMILY MEDICINE

## 2020-10-21 PROCEDURE — 90471 IMMUNIZATION ADMIN: CPT | Performed by: FAMILY MEDICINE

## 2020-10-21 NOTE — PROGRESS NOTES
Immunizations Administered     Name Date Dose Route    Influenza, Quadv, IM, (6 mo and older Fluzone, Flulaval, Fluarix and 3 yrs and older Afluria) 10/21/2020 0.5 mL Intramuscular    Site: Deltoid- Left    Lot: [id number]    NDC: 93492-328-40

## 2020-11-04 ENCOUNTER — VIRTUAL VISIT (OUTPATIENT)
Dept: FAMILY MEDICINE CLINIC | Age: 45
End: 2020-11-04

## 2020-11-04 ENCOUNTER — HOSPITAL ENCOUNTER (OUTPATIENT)
Age: 45
Setting detail: SPECIMEN
Discharge: HOME | End: 2020-11-04
Payer: COMMERCIAL

## 2020-11-04 PROCEDURE — U0003 INFECTIOUS AGENT DETECTION BY NUCLEIC ACID (DNA OR RNA); SEVERE ACUTE RESPIRATORY SYNDROME CORONAVIRUS 2 (SARS-COV-2) (CORONAVIRUS DISEASE [COVID-19]), AMPLIFIED PROBE TECHNIQUE, MAKING USE OF HIGH THROUGHPUT TECHNOLOGIES AS DESCRIBED BY CMS-2020-01-R: HCPCS

## 2020-11-04 PROCEDURE — 99213 OFFICE O/P EST LOW 20 MIN: CPT | Performed by: FAMILY MEDICINE

## 2020-11-04 RX ORDER — LORATADINE 10 MG/1
10 CAPSULE, LIQUID FILLED ORAL DAILY
COMMUNITY
End: 2021-02-04

## 2020-11-04 RX ORDER — AMOXICILLIN AND CLAVULANATE POTASSIUM 875; 125 MG/1; MG/1
1 TABLET, FILM COATED ORAL 2 TIMES DAILY
Qty: 20 TABLET | Refills: 0 | Status: SHIPPED | OUTPATIENT
Start: 2020-11-04 | End: 2020-11-14

## 2020-11-04 ASSESSMENT — ENCOUNTER SYMPTOMS
SINUS PRESSURE: 1
ABDOMINAL PAIN: 0
EYE PAIN: 0
CHEST TIGHTNESS: 0
COUGH: 1
CONSTIPATION: 0
WHEEZING: 0
SORE THROAT: 1
SHORTNESS OF BREATH: 0
HOARSE VOICE: 1
NAUSEA: 0

## 2020-11-04 NOTE — PROGRESS NOTES
Lashae agreed to Video Chat/Exam in presence of Dr Yovany Fernandes and myself. Verified who was present in room with Lashae. Lashae informed the e-mail address used to Face Time cannot be used to contact the Provider, if they are any questions or concerns they need to call the office directly. Lashae stated understanding.

## 2020-11-04 NOTE — PROGRESS NOTES
Subjective:      Patient ID: Ander Sommer is a 54 y.o. female. Patient verified Video Chat was not encrypted, and agrees to the Video Exam in presence of nurse. Sinusitis   This is a new problem. Episode onset:  past    5  days  The problem has been gradually worsening since onset. There has been no fever. Associated symptoms include congestion, coughing, headaches, a hoarse voice, sinus pressure and a sore throat. Pertinent negatives include no ear pain, neck pain or shortness of breath. (  Green  Nasal  Drainage  Noted ) Past treatments include nothing. The treatment provided no relief. Past Medical History:   Diagnosis Date    Asthma     Panic attacks 4/98    Seasonal allergies 4/99       Review of Systems   Constitutional: Negative for appetite change, fatigue and fever. HENT: Positive for congestion, hoarse voice, sinus pressure and sore throat. Negative for ear pain and postnasal drip. Eyes: Negative for pain and visual disturbance. Respiratory: Positive for cough. Negative for chest tightness, shortness of breath and wheezing. Cardiovascular: Negative for chest pain, palpitations and leg swelling. Gastrointestinal: Negative for abdominal pain, constipation and nausea. Genitourinary: Negative for dysuria and frequency. Musculoskeletal: Negative for arthralgias, joint swelling, neck pain and neck stiffness. Skin: Negative for rash. Neurological: Positive for headaches. Negative for dizziness, weakness and numbness. Hematological: Negative for adenopathy. Does not bruise/bleed easily. Psychiatric/Behavioral: Negative for behavioral problems and sleep disturbance. The patient is not nervous/anxious. There were no vitals taken for this visit. Objective:   Physical Exam  Constitutional:       General: She is not in acute distress. Appearance: She is ill-appearing. HENT:      Nose: Congestion present. Neck:      Musculoskeletal: Normal range of motion. Musculoskeletal: Normal range of motion. Neurological:      General: No focal deficit present. Mental Status: She is alert. Mental status is at baseline. Cranial Nerves: No cranial nerve deficit. Sensory: No sensory deficit. Motor: No weakness. Gait: Gait normal.      Deep Tendon Reflexes: Reflexes normal.         Assessment:       Diagnosis Orders   1. Cough with exposure to COVID-19 virus  amoxicillin-clavulanate (AUGMENTIN) 875-125 MG per tablet    COVID-19 Ambulatory   2. Acute non-recurrent ethmoidal sinusitis  amoxicillin-clavulanate (AUGMENTIN) 875-125 MG per tablet         Plan:      Current Outpatient Medications   Medication Sig Dispense Refill    loratadine (CLARITIN) 10 MG capsule Take 10 mg by mouth daily      amoxicillin-clavulanate (AUGMENTIN) 875-125 MG per tablet Take 1 tablet by mouth 2 times daily for 10 days 20 tablet 0    meclizine (ANTIVERT) 25 MG tablet Take 1 tablet by mouth 4 times daily as needed for Dizziness 40 tablet 1    sertraline (ZOLOFT) 50 MG tablet TAKE 1 TABLET BY MOUTH ONE TIME A DAY  (Patient taking differently: TAKE 1/2 TABLET BY MOUTH ONE TIME A DAY) 90 tablet 0    albuterol sulfate HFA (VENTOLIN HFA) 108 (90 Base) MCG/ACT inhaler Inhale 2 puffs into the lungs every 6 hours as needed for Wheezing 1 Inhaler 3    albuterol (PROVENTIL) (2.5 MG/3ML) 0.083% nebulizer solution Take 3 mLs by nebulization 4 times daily 1 Package 3    fluticasone (FLONASE) 50 MCG/ACT nasal spray USE ONE SPRAY(S) IN EACH NOSTRIL ONCE DAILY 16 g 5    Pseudoephedrine HCl (SUDAFED 12 HOUR PO) Take 1 tablet by mouth daily        No current facility-administered medications for this visit.           Orders Placed This Encounter   Procedures    COVID-19 Ambulatory     Standing Status:   Future     Standing Expiration Date:   11/4/2021     Scheduling Instructions:      Saline media preferred given current shortage of viral transport media but both acceptable     Order Specific Question:   Is this test for diagnosis or screening? Answer:   Diagnosis of ill patient     Order Specific Question:   Symptomatic for COVID-19 as defined by CDC? Answer:   Yes     Order Specific Question:   Date of Symptom Onset     Answer:   10/31/2020     Order Specific Question:   Hospitalized for COVID-19? Answer:   No     Order Specific Question:   Admitted to ICU for COVID-19? Answer:   No     Order Specific Question:   Employed in healthcare setting? Answer:   No     Order Specific Question:   Resident in a congregate (group) care setting? Answer:   No     Order Specific Question:   Pregnant? Answer:   No     Order Specific Question:   Previously tested for COVID-19? Answer:    Yes           rx  With  augmentin  But  Needs checked  For   covid     covid  precautions    Video  15  mins   Herson Gamboa MD

## 2020-11-05 LAB — SARS-COV-2: NOT DETECTED

## 2020-11-06 ENCOUNTER — TELEPHONE (OUTPATIENT)
Dept: FAMILY MEDICINE CLINIC | Age: 45
End: 2020-11-06

## 2020-12-02 ENCOUNTER — HOSPITAL ENCOUNTER (OUTPATIENT)
Dept: WOMENS IMAGING | Age: 45
Discharge: HOME | End: 2020-12-02
Payer: COMMERCIAL

## 2020-12-02 PROCEDURE — 77063 BREAST TOMOSYNTHESIS BI: CPT

## 2020-12-02 NOTE — TELEPHONE ENCOUNTER
Date of last visit:  11/4/2020  Date of next visit:  2/23/2021    Requested Prescriptions     Pending Prescriptions Disp Refills    montelukast (SINGULAIR) 10 MG tablet [Pharmacy Med Name: Montelukast Sodium Oral Tablet 10 MG] 30 tablet 0     Sig: TAKE 1 TABLET BY MOUTH ONE TIME A DAY AT NIGHT

## 2020-12-03 RX ORDER — MONTELUKAST SODIUM 10 MG/1
TABLET ORAL
Qty: 30 TABLET | Refills: 0 | Status: SHIPPED | OUTPATIENT
Start: 2020-12-03 | End: 2021-07-15 | Stop reason: SINTOL

## 2020-12-23 ENCOUNTER — TELEPHONE (OUTPATIENT)
Dept: FAMILY MEDICINE CLINIC | Age: 45
End: 2020-12-23

## 2020-12-23 DIAGNOSIS — Z20.822 SUSPECTED COVID-19 VIRUS INFECTION: Primary | ICD-10-CM

## 2020-12-23 DIAGNOSIS — J01.91 ACUTE RECURRENT SINUSITIS, UNSPECIFIED LOCATION: ICD-10-CM

## 2020-12-23 RX ORDER — AZITHROMYCIN 250 MG/1
TABLET, FILM COATED ORAL
Qty: 1 PACKET | Refills: 0 | Status: SHIPPED | OUTPATIENT
Start: 2020-12-23 | End: 2021-02-04 | Stop reason: ALTCHOICE

## 2020-12-23 NOTE — TELEPHONE ENCOUNTER
Pt called req ATB for sinus headache, green sinus mucus, sinus pressure. No other symptoms     [facility]     ALSO  Pt req a covid test due to traveling first part of January.     [facility]    Please call pt once addressed to inform if test ok'd

## 2020-12-29 ENCOUNTER — HOSPITAL ENCOUNTER (OUTPATIENT)
Age: 45
Setting detail: SPECIMEN
Discharge: HOME | End: 2020-12-29
Payer: COMMERCIAL

## 2020-12-29 PROCEDURE — U0003 INFECTIOUS AGENT DETECTION BY NUCLEIC ACID (DNA OR RNA); SEVERE ACUTE RESPIRATORY SYNDROME CORONAVIRUS 2 (SARS-COV-2) (CORONAVIRUS DISEASE [COVID-19]), AMPLIFIED PROBE TECHNIQUE, MAKING USE OF HIGH THROUGHPUT TECHNOLOGIES AS DESCRIBED BY CMS-2020-01-R: HCPCS

## 2020-12-30 LAB — SARS-COV-2: NOT DETECTED

## 2020-12-30 NOTE — TELEPHONE ENCOUNTER
Date of last visit:  11/4/2020  Date of next visit:  2/23/2021    Requested Prescriptions     Pending Prescriptions Disp Refills    sertraline (ZOLOFT) 50 MG tablet [Pharmacy Med Name: Sertraline HCl Oral Tablet 50 MG] 90 tablet 1     Sig: TAKE 1/2 TABLET BY MOUTH ONE TIME A DAY

## 2020-12-31 ENCOUNTER — TELEPHONE (OUTPATIENT)
Dept: FAMILY MEDICINE CLINIC | Age: 45
End: 2020-12-31

## 2020-12-31 NOTE — TELEPHONE ENCOUNTER
----- Message from Brijesh Tellez MD sent at 12/30/2020 11:25 PM EST -----  Call as covid test  Is negative

## 2021-02-04 ENCOUNTER — VIRTUAL VISIT (OUTPATIENT)
Dept: FAMILY MEDICINE CLINIC | Age: 46
End: 2021-02-04

## 2021-02-04 ENCOUNTER — HOSPITAL ENCOUNTER (OUTPATIENT)
Age: 46
Setting detail: SPECIMEN
Discharge: HOME | End: 2021-02-04
Payer: COMMERCIAL

## 2021-02-04 DIAGNOSIS — Z20.822 COUGH WITH EXPOSURE TO COVID-19 VIRUS: Primary | ICD-10-CM

## 2021-02-04 DIAGNOSIS — R05.8 COUGH WITH EXPOSURE TO COVID-19 VIRUS: Primary | ICD-10-CM

## 2021-02-04 DIAGNOSIS — J01.91 ACUTE RECURRENT SINUSITIS, UNSPECIFIED LOCATION: ICD-10-CM

## 2021-02-04 PROCEDURE — U0003 INFECTIOUS AGENT DETECTION BY NUCLEIC ACID (DNA OR RNA); SEVERE ACUTE RESPIRATORY SYNDROME CORONAVIRUS 2 (SARS-COV-2) (CORONAVIRUS DISEASE [COVID-19]), AMPLIFIED PROBE TECHNIQUE, MAKING USE OF HIGH THROUGHPUT TECHNOLOGIES AS DESCRIBED BY CMS-2020-01-R: HCPCS

## 2021-02-04 PROCEDURE — 99213 OFFICE O/P EST LOW 20 MIN: CPT | Performed by: FAMILY MEDICINE

## 2021-02-04 RX ORDER — CETIRIZINE HYDROCHLORIDE 10 MG/1
10 TABLET ORAL NIGHTLY
COMMUNITY

## 2021-02-04 RX ORDER — AMOXICILLIN AND CLAVULANATE POTASSIUM 875; 125 MG/1; MG/1
1 TABLET, FILM COATED ORAL 2 TIMES DAILY
Qty: 30 TABLET | Refills: 0 | Status: SHIPPED | OUTPATIENT
Start: 2021-02-04 | End: 2021-02-19

## 2021-02-04 ASSESSMENT — ENCOUNTER SYMPTOMS
SINUS COMPLAINT: 1
SHORTNESS OF BREATH: 0
HOARSE VOICE: 1
EYE PAIN: 0
WHEEZING: 0
COUGH: 1
ABDOMINAL PAIN: 0
CONSTIPATION: 0
NAUSEA: 0
SINUS PRESSURE: 1
CHEST TIGHTNESS: 0
SORE THROAT: 1

## 2021-02-04 ASSESSMENT — PATIENT HEALTH QUESTIONNAIRE - PHQ9
2. FEELING DOWN, DEPRESSED OR HOPELESS: 0
SUM OF ALL RESPONSES TO PHQ QUESTIONS 1-9: 0
1. LITTLE INTEREST OR PLEASURE IN DOING THINGS: 0

## 2021-02-04 NOTE — PROGRESS NOTES
Subjective:      Patient ID: Day Martin is a 54 y.o. female. Patient verified Video Chat was not encrypted, and agrees to the Video Exam in presence of nurse. Sinus Problem  This is a new problem. The current episode started in the past 7 days. There has been no fever. Associated symptoms include congestion, coughing, a hoarse voice, sinus pressure and a sore throat. Pertinent negatives include no ear pain, headaches, neck pain or shortness of breath. Past treatments include oral decongestants. Past Medical History:   Diagnosis Date    Asthma     Panic attacks 4/98    Seasonal allergies 4/99     Review of Systems   Constitutional: Negative for appetite change, fatigue and fever. Does  Have  Exposure  To  covid although  Sounds  More  Classic  Sinus    HENT: Positive for congestion, hoarse voice, sinus pressure and sore throat. Negative for ear pain and postnasal drip. Eyes: Negative for pain and visual disturbance. Respiratory: Positive for cough. Negative for chest tightness, shortness of breath and wheezing. Cardiovascular: Negative for chest pain, palpitations and leg swelling. Gastrointestinal: Negative for abdominal pain, constipation and nausea. Genitourinary: Negative for dysuria and frequency. Musculoskeletal: Negative for arthralgias, joint swelling, neck pain and neck stiffness. Skin: Negative for rash. Neurological: Negative for dizziness, weakness, numbness and headaches. Hematological: Negative for adenopathy. Does not bruise/bleed easily. Psychiatric/Behavioral: Negative for behavioral problems and sleep disturbance. The patient is not nervous/anxious. Objective:   Physical Exam  Constitutional:       General: She is not in acute distress. Appearance: Normal appearance. She is not ill-appearing, toxic-appearing or diaphoretic. HENT:      Nose: Congestion present. Neck:      Musculoskeletal: Normal range of motion.    Musculoskeletal: Normal range of motion. Skin:     Findings: No rash. Neurological:      General: No focal deficit present. Mental Status: She is alert and oriented to person, place, and time. Cranial Nerves: No cranial nerve deficit. Sensory: No sensory deficit. Motor: No weakness. Coordination: Coordination normal.      Gait: Gait normal.      Deep Tendon Reflexes: Reflexes normal.         Assessment:       Diagnosis Orders   1. Cough with exposure to COVID-19 virus  COVID-19 Ambulatory   2. Acute recurrent sinusitis, unspecified location  amoxicillin-clavulanate (AUGMENTIN) 875-125 MG per tablet         Plan:      Current Outpatient Medications   Medication Sig Dispense Refill    cetirizine (ZYRTEC) 10 MG tablet Take 10 mg by mouth daily      amoxicillin-clavulanate (AUGMENTIN) 875-125 MG per tablet Take 1 tablet by mouth 2 times daily for 15 days 30 tablet 0    meclizine (ANTIVERT) 25 MG tablet Take 1 tablet by mouth 4 times daily as needed for Dizziness 40 tablet 1    albuterol sulfate HFA (VENTOLIN HFA) 108 (90 Base) MCG/ACT inhaler Inhale 2 puffs into the lungs every 6 hours as needed for Wheezing 1 Inhaler 3    albuterol (PROVENTIL) (2.5 MG/3ML) 0.083% nebulizer solution Take 3 mLs by nebulization 4 times daily 1 Package 3    fluticasone (FLONASE) 50 MCG/ACT nasal spray USE ONE SPRAY(S) IN EACH NOSTRIL ONCE DAILY 16 g 5    Pseudoephedrine HCl (SUDAFED 12 HOUR PO) Take 1 tablet by mouth daily       sertraline (ZOLOFT) 50 MG tablet TAKE 1/2 TABLET BY MOUTH ONE TIME A DAY (Patient not taking: Reported on 2/4/2021) 90 tablet 1    montelukast (SINGULAIR) 10 MG tablet TAKE 1 TABLET BY MOUTH ONE TIME A DAY AT NIGHT (Patient not taking: Reported on 2/4/2021) 30 tablet 0     No current facility-administered medications for this visit.       Orders Placed This Encounter   Procedures    COVID-19 Ambulatory     Standing Status:   Future     Standing Expiration Date:   2/4/2022     Scheduling Instructions:      Saline media preferred given current shortage of viral transport media but both acceptable     Order Specific Question:   Is this test for diagnosis or screening? Answer:   Diagnosis of ill patient     Order Specific Question:   Symptomatic for COVID-19 as defined by CDC? Answer:   Yes     Order Specific Question:   Date of Symptom Onset     Answer:   2/2/2021     Order Specific Question:   Hospitalized for COVID-19? Answer:   No     Order Specific Question:   Admitted to ICU for COVID-19? Answer:   No     Order Specific Question:   Employed in healthcare setting? Answer:   No     Order Specific Question:   Resident in a congregate (group) care setting? Answer:   No     Order Specific Question:   Pregnant? Answer:   No     Order Specific Question:   Previously tested for COVID-19?      Answer:   Yes         Feel more  s inus  But  Need  To still  Check  For  covid  infectioin and  Patient  Will  Get  tested    covid  Precautions      video  15  mins   Yeimy Shahid MD

## 2021-02-04 NOTE — PROGRESS NOTES
Lashae agreed to Video Chat/Exam in presence of Dr Damien Victor and myself. Verified who was present in room with Lashae. Lashae informed the e-mail address used to Face Time cannot be used to contact the Provider, if they are any questions or concerns they need to call the office directly. Lashae stated understanding.

## 2021-02-06 LAB — SARS-COV-2: NOT DETECTED

## 2021-02-08 ENCOUNTER — TELEPHONE (OUTPATIENT)
Dept: FAMILY MEDICINE CLINIC | Age: 46
End: 2021-02-08

## 2021-02-08 NOTE — TELEPHONE ENCOUNTER
----- Message from Luanne Ortiz MD sent at 2/7/2021  6:18 PM EST -----  Call as covid test was negative

## 2021-02-18 ENCOUNTER — TELEPHONE (OUTPATIENT)
Dept: CASE MANAGEMENT | Age: 46
End: 2021-02-18

## 2021-02-18 NOTE — TELEPHONE ENCOUNTER
Name: Abilio Carrero  : 1975  MRN: [id number]    Oncology Navigation Follow-Up Note    Contact Type:  Telephone    Notes: Left message with contact number returned call re: request for genetic evaluation on Breast Cancer Risk Assessment completed at [facility] during mammogram visit.     Electronically signed by Elaina Becker RN on 2021 at 1:08 PM

## 2021-02-26 ENCOUNTER — OFFICE VISIT (OUTPATIENT)
Dept: FAMILY MEDICINE CLINIC | Age: 46
End: 2021-02-26

## 2021-02-26 ENCOUNTER — TELEPHONE (OUTPATIENT)
Dept: ENT CLINIC | Age: 46
End: 2021-02-26

## 2021-02-26 VITALS
RESPIRATION RATE: 16 BRPM | DIASTOLIC BLOOD PRESSURE: 76 MMHG | SYSTOLIC BLOOD PRESSURE: 128 MMHG | TEMPERATURE: 96.9 F | HEIGHT: 63 IN | BODY MASS INDEX: 33.51 KG/M2 | WEIGHT: 189.13 LBS | HEART RATE: 76 BPM

## 2021-02-26 DIAGNOSIS — J45.20 MILD INTERMITTENT ASTHMA WITHOUT COMPLICATION: ICD-10-CM

## 2021-02-26 DIAGNOSIS — J30.2 SEASONAL ALLERGIES: ICD-10-CM

## 2021-02-26 DIAGNOSIS — Z00.00 WELL FEMALE EXAM WITHOUT GYNECOLOGICAL EXAM: Primary | ICD-10-CM

## 2021-02-26 DIAGNOSIS — J01.21 ACUTE RECURRENT ETHMOIDAL SINUSITIS: ICD-10-CM

## 2021-02-26 PROCEDURE — 99396 PREV VISIT EST AGE 40-64: CPT | Performed by: FAMILY MEDICINE

## 2021-02-26 RX ORDER — AMOXICILLIN AND CLAVULANATE POTASSIUM 875; 125 MG/1; MG/1
1 TABLET, FILM COATED ORAL 2 TIMES DAILY
Qty: 20 TABLET | Refills: 0 | Status: SHIPPED | OUTPATIENT
Start: 2021-02-26 | End: 2021-03-08

## 2021-02-26 ASSESSMENT — ENCOUNTER SYMPTOMS
ABDOMINAL DISTENTION: 0
ABDOMINAL PAIN: 0
CHEST TIGHTNESS: 0
BACK PAIN: 0
SHORTNESS OF BREATH: 0

## 2021-02-26 NOTE — PROGRESS NOTES
CARE VISIT    Amy Liddie Scheuermann  YOB: 1975    Date of Service:  2/26/2021    Chief Complaint:   Finesse Veliz is a 54 y.o. female who presents for Comprehensive Annual Evaluation    Patient Active Problem List    Diagnosis Date Noted    Acute non-recurrent ethmoidal sinusitis 05/20/2019    Asthma     Seasonal allergies 04/01/1999    Panic attacks 04/01/1998        sinus  And   Better  But  Persist     Preventive Care:  Last eye exam:  2020  Exercise: no regular exercise  Fracture within the past 6 months: no      Living will:    No  Will   Durable power  Of health        No labs  Done      mammogram   done  2020 and   Family hx  Of  breast  Mother and mgm        Review of Systems   Constitutional: Negative for activity change. HENT: Positive for congestion, nosebleeds and postnasal drip. Respiratory: Negative for chest tightness and shortness of breath. Cardiovascular: Negative for chest pain and palpitations. Gastrointestinal: Negative for abdominal distention and abdominal pain. Genitourinary: Negative for difficulty urinating and dyspareunia. Musculoskeletal: Negative for arthralgias, back pain and gait problem. Neurological: Negative for dizziness, facial asymmetry and headaches. Allergies   Allergen Reactions    Seasonal      Prior to Visit Medications    Medication Sig Taking?  Authorizing Provider   cetirizine (ZYRTEC) 10 MG tablet Take 10 mg by mouth daily Yes Historical Provider, MD   montelukast (SINGULAIR) 10 MG tablet TAKE 1 TABLET BY MOUTH ONE TIME A DAY AT NIGHT Yes Hoda Gorman MD   meclizine (ANTIVERT) 25 MG tablet Take 1 tablet by mouth 4 times daily as needed for Dizziness Yes Hoda Gorman MD   albuterol sulfate HFA (VENTOLIN HFA) 108 (90 Base) MCG/ACT inhaler Inhale 2 puffs into the lungs every 6 hours as needed for Wheezing Yes Hoda Gorman MD   albuterol (PROVENTIL) (2.5 MG/3ML) 0.083% nebulizer solution Take 3 mLs by nebulization 4 times daily Yes Shannon Lepe MD   fluticasone (FLONASE) 50 MCG/ACT nasal spray USE ONE SPRAY(S) IN EACH NOSTRIL ONCE DAILY Yes Shannon Lepe MD   Pseudoephedrine HCl (SUDAFED 12 HOUR PO) Take 1 tablet by mouth daily  Yes Historical Provider, MD       Past Medical History:   Diagnosis Date    Asthma     Panic attacks 4/98    Seasonal allergies 4/99     Past Surgical History:   Procedure Laterality Date    INTRAUTERINE DEVICE INSERTION      murena    TUBAL LIGATION  7/8/14    Dr Montoya Kaveh     Family History   Problem Relation Age of Onset    Cancer Mother 46        breast   cancer     Social History     Socioeconomic History    Marital status:      Spouse name: Not on file    Number of children: Not on file    Years of education: Not on file    Highest education level: Not on file   Occupational History    Not on file   Social Needs    Financial resource strain: Not on file    Food insecurity     Worry: Not on file     Inability: Not on file    Transportation needs     Medical: Not on file     Non-medical: Not on file   Tobacco Use    Smoking status: Never Smoker    Smokeless tobacco: Never Used   Substance and Sexual Activity    Alcohol use: Yes     Comment: Rarely     Drug use: No    Sexual activity: Yes     Partners: Male   Lifestyle    Physical activity     Days per week: Not on file     Minutes per session: Not on file    Stress: Not on file   Relationships    Social connections     Talks on phone: Not on file     Gets together: Not on file     Attends [employer] service: Not on file     Active member of club or organization: Not on file     Attends meetings of clubs or organizations: Not on file     Relationship status: Not on file    Intimate partner violence     Fear of current or ex partner: Not on file     Emotionally abused: Not on file     Physically abused: Not on file     Forced sexual activity: Not on file   Other Topics Concern    Not on file Social History Narrative    Not on file       Wt Readings from Last 3 Encounters:   02/26/21 189 lb 2 oz (85.8 kg)   08/26/20 181 lb 6 oz (82.3 kg)   02/03/20 179 lb 2 oz (81.3 kg)     BP Readings from Last 3 Encounters:   02/26/21 128/76   08/26/20 122/72   02/03/20 126/74        Vitals:    02/26/21 1110   BP: 128/76   Site: Right Upper Arm   Position: Sitting   Cuff Size: Medium Adult   Pulse: 76   Resp: 16   Temp: 96.9 °F (36.1 °C)   TempSrc: Oral   Weight: 189 lb 2 oz (85.8 kg)   Height: 5' 3\" (1.6 m)     Body mass index is 33.5 kg/m². Physical Exam  Vitals signs and nursing note reviewed. Constitutional:       Appearance: She is well-developed. HENT:      Head: Normocephalic and atraumatic. Right Ear: External ear normal.      Left Ear: External ear normal.      Nose: Nose normal.   Eyes:      General: No scleral icterus. Conjunctiva/sclera: Conjunctivae normal.      Pupils: Pupils are equal, round, and reactive to light. Neck:      Musculoskeletal: Normal range of motion and neck supple. Thyroid: No thyromegaly. Vascular: No JVD. Cardiovascular:      Rate and Rhythm: Normal rate and regular rhythm. Heart sounds: Normal heart sounds. Pulmonary:      Effort: Pulmonary effort is normal.      Breath sounds: Normal breath sounds. No wheezing or rales. Abdominal:      General: Bowel sounds are normal. There is no distension. Palpations: Abdomen is soft. There is no mass. Tenderness: There is no abdominal tenderness. Musculoskeletal: Normal range of motion. General: No tenderness. Lymphadenopathy:      Cervical: No cervical adenopathy. Skin:     General: Skin is warm and dry. Findings: No rash. Neurological:      Mental Status: She is alert and oriented to person, place, and time. Cranial Nerves: No cranial nerve deficit. Deep Tendon Reflexes: Reflexes are normal and symmetric.          Lipid panel: No results found for: CHOL, TRIG, HDL, LDLCALC, LDLDIRECT  Glucose: No results found for: GLUCOSE, GLUF    Patient Care Team:  Serge Wolff MD as PCP - General (Family Medicine)  Serge Wolff MD as PCP - [facility]    Immunization History   Administered Date(s) Administered    COVID-19Darek, 30mcg/0.3ml Dose 02/21/2021    Influenza 10/22/2013    Influenza Virus Vaccine 10/30/2014, 10/22/2015    Influenza, MDCK Quadv, IM, PF (Flucelvax 4 yrs and older) 10/13/2017    Influenza, MDCK  Kocher, with preserv IM (Flucelvax 4 yrs and older) 10/29/2019    Influenza,  Kocher, IM, (6 mo and older Fluzone, Flulaval, Fluarix and 3 yrs and older Afluria) 10/28/2016, 10/21/2020    Influenza, Quadv, IM, PF (6 mo and older Fluzone, Flulaval, Fluarix, and 3 yrs and older Afluria) 10/21/2018       Health Maintenance Due   Topic Date Due    Hepatitis C screen  1975    Pneumococcal 0-64 years Vaccine (1 of 1 - PPSV23) 05/15/1980    DTaP/Tdap/Td vaccine (1 - Tdap) 05/15/1993    Cervical cancer screen  05/15/1995    Lipid screen  05/15/2014    Diabetes screen  05/15/2014        Assessment/Plan:   Diagnosis Orders   1. Well female exam without gynecological exam  amoxicillin-clavulanate (AUGMENTIN) 875-125 MG per tablet    Lipid Panel   2. Seasonal allergies  TSH with Reflex    Comprehensive Metabolic Panel    CBC Auto Differential   3. Mild intermittent asthma without complication     4. Acute recurrent ethmoidal sinusitis  Vignesh Avila MD, Otolaryngology, [email or web address]     There are no diagnoses linked to this encounter.    PLAn    Current Outpatient Medications   Medication Sig Dispense Refill    amoxicillin-clavulanate (AUGMENTIN) 875-125 MG per tablet Take 1 tablet by mouth 2 times daily for 10 days 20 tablet 0    cetirizine (ZYRTEC) 10 MG tablet Take 10 mg by mouth daily      montelukast (SINGULAIR) 10 MG tablet TAKE 1 TABLET BY MOUTH ONE TIME A DAY AT NIGHT 30 tablet 0    meclizine (ANTIVERT) 25 MG tablet Take 1

## 2021-03-06 LAB
ALBUMIN SERPL-MCNC: 4.2 G/DL
ALP BLD-CCNC: 78 U/L
ALT SERPL-CCNC: 23 U/L
ANION GAP SERPL CALCULATED.3IONS-SCNC: NORMAL MMOL/L
AST SERPL-CCNC: 22 U/L
BASOPHILS ABSOLUTE: 0 /ΜL
BASOPHILS RELATIVE PERCENT: 1 %
BILIRUB SERPL-MCNC: 0.5 MG/DL (ref 0.1–1.4)
BUN BLDV-MCNC: 8 MG/DL
CALCIUM SERPL-MCNC: 9.2 MG/DL
CHLORIDE BLD-SCNC: 104 MMOL/L
CHOLESTEROL, TOTAL: 164 MG/DL
CHOLESTEROL/HDL RATIO: NORMAL
CO2: 22 MMOL/L
CREAT SERPL-MCNC: 0.74 MG/DL
EOSINOPHILS ABSOLUTE: 0.2 /ΜL
EOSINOPHILS RELATIVE PERCENT: 3 %
GFR CALCULATED: NORMAL
GLUCOSE BLD-MCNC: 90 MG/DL
HCT VFR BLD CALC: 42.5 % (ref 36–46)
HDLC SERPL-MCNC: 58 MG/DL (ref 35–70)
HEMOGLOBIN: 14.4 G/DL (ref 12–16)
LDL CHOLESTEROL CALCULATED: 92 MG/DL (ref 0–160)
LYMPHOCYTES ABSOLUTE: 2.5 /ΜL
LYMPHOCYTES RELATIVE PERCENT: 43 %
MCH RBC QN AUTO: 30.1 PG
MCHC RBC AUTO-ENTMCNC: 33.9 G/DL
MCV RBC AUTO: 89 FL
MONOCYTES ABSOLUTE: 0.6 /ΜL
MONOCYTES RELATIVE PERCENT: 10 %
NEUTROPHILS ABSOLUTE: 2.5 /ΜL
NEUTROPHILS RELATIVE PERCENT: 43 %
NONHDLC SERPL-MCNC: NORMAL MG/DL
PDW BLD-RTO: NORMAL %
PLATELET # BLD: 253 K/ΜL
PMV BLD AUTO: NORMAL FL
POTASSIUM SERPL-SCNC: 4.2 MMOL/L
RBC # BLD: 4.79 10^6/ΜL
SODIUM BLD-SCNC: 139 MMOL/L
TOTAL PROTEIN: 6.9
TRIGL SERPL-MCNC: 71 MG/DL
TSH SERPL DL<=0.05 MIU/L-ACNC: 1.73 UIU/ML
VLDLC SERPL CALC-MCNC: 14 MG/DL
WBC # BLD: 5.8 10^3/ML

## 2021-03-08 DIAGNOSIS — J30.2 SEASONAL ALLERGIES: ICD-10-CM

## 2021-03-08 DIAGNOSIS — Z00.00 WELL FEMALE EXAM WITHOUT GYNECOLOGICAL EXAM: ICD-10-CM

## 2021-03-09 ENCOUNTER — TELEPHONE (OUTPATIENT)
Dept: FAMILY MEDICINE CLINIC | Age: 46
End: 2021-03-09

## 2021-03-09 NOTE — TELEPHONE ENCOUNTER
----- Message from Hoda Gorman MD sent at 3/9/2021  8:01 AM EST -----  Call labs all good and  Chol  164 so stable

## 2021-03-29 ENCOUNTER — OFFICE VISIT (OUTPATIENT)
Dept: ENT CLINIC | Age: 46
End: 2021-03-29
Payer: COMMERCIAL

## 2021-03-29 VITALS
RESPIRATION RATE: 14 BRPM | HEIGHT: 63 IN | TEMPERATURE: 97.3 F | DIASTOLIC BLOOD PRESSURE: 72 MMHG | HEART RATE: 72 BPM | SYSTOLIC BLOOD PRESSURE: 128 MMHG | BODY MASS INDEX: 32.78 KG/M2 | WEIGHT: 185 LBS

## 2021-03-29 DIAGNOSIS — J34.89 NASAL OBSTRUCTION: ICD-10-CM

## 2021-03-29 DIAGNOSIS — J34.89 NASAL VALVE COLLAPSE: ICD-10-CM

## 2021-03-29 DIAGNOSIS — J32.4 CHRONIC PANSINUSITIS: ICD-10-CM

## 2021-03-29 DIAGNOSIS — G47.33 OBSTRUCTIVE SLEEP APNEA: Primary | ICD-10-CM

## 2021-03-29 PROBLEM — J34.829 NASAL VALVE COLLAPSE: Status: ACTIVE | Noted: 2021-03-29

## 2021-03-29 PROBLEM — M95.0 NASAL VALVE COLLAPSE: Status: ACTIVE | Noted: 2021-03-29

## 2021-03-29 PROCEDURE — 99205 OFFICE O/P NEW HI 60 MIN: CPT | Performed by: OTOLARYNGOLOGY

## 2021-03-29 RX ORDER — VITAMIN B COMPLEX
1 CAPSULE ORAL DAILY
COMMUNITY
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
## 2021-04-02 ENCOUNTER — TELEPHONE (OUTPATIENT)
Dept: OTOLARYNGOLOGY | Age: 46
End: 2021-04-02

## 2021-04-02 ENCOUNTER — HOSPITAL ENCOUNTER (OUTPATIENT)
Dept: CT IMAGING | Age: 46
Discharge: HOME | End: 2021-04-02
Payer: COMMERCIAL

## 2021-04-02 DIAGNOSIS — G47.33 OBSTRUCTIVE SLEEP APNEA: ICD-10-CM

## 2021-04-02 DIAGNOSIS — J34.89 NASAL VALVE COLLAPSE: ICD-10-CM

## 2021-04-02 DIAGNOSIS — J32.4 CHRONIC PANSINUSITIS: ICD-10-CM

## 2021-04-02 DIAGNOSIS — J34.89 NASAL OBSTRUCTION: ICD-10-CM

## 2021-04-02 PROCEDURE — 70486 CT MAXILLOFACIAL W/O DYE: CPT

## 2021-04-05 ENCOUNTER — OFFICE VISIT (OUTPATIENT)
Dept: ENT CLINIC | Age: 46
End: 2021-04-05
Payer: COMMERCIAL

## 2021-04-05 VITALS
SYSTOLIC BLOOD PRESSURE: 128 MMHG | HEART RATE: 84 BPM | HEIGHT: 63 IN | WEIGHT: 183 LBS | BODY MASS INDEX: 32.43 KG/M2 | DIASTOLIC BLOOD PRESSURE: 80 MMHG | RESPIRATION RATE: 14 BRPM | OXYGEN SATURATION: 97 % | TEMPERATURE: 98.4 F

## 2021-04-05 DIAGNOSIS — J34.89 CONCHA BULLOSA: ICD-10-CM

## 2021-04-05 DIAGNOSIS — J32.0 CHRONIC MAXILLARY SINUSITIS: Primary | ICD-10-CM

## 2021-04-05 DIAGNOSIS — J32.2 CHRONIC ETHMOIDITIS: ICD-10-CM

## 2021-04-05 DIAGNOSIS — Z01.818 PRE-OP TESTING: Primary | ICD-10-CM

## 2021-04-05 PROCEDURE — 99214 OFFICE O/P EST MOD 30 MIN: CPT | Performed by: OTOLARYNGOLOGY

## 2021-04-05 NOTE — PROGRESS NOTES
[facility] [place] [facility]  [facility]  [address] [place] [address]  Dept: [phone]  Dept Fax: [phone]  Loc: [phone]    Lashae Jenkins is a 54 y.o. female who was referred by No ref. provider found for:  Chief Complaint   Patient presents with    Follow-up     Patient is here for a follow up for CT.       HPI:     Tk Aguila is a 54 y.o. female with a history of right-sided facial pressure and dental pain for many years possibly over a decade. The patient has had a recent CT scan demonstrating right chronic obstructive maxillary sinusitis with a Galen cell of the ethmoid system at the opening of the infundibulum of the maxillary sinus and mild anterior ethmoid sinus thickening in the region. 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 She comes in today with her  to discuss these findings and plan treatment. History:      Allergies   Allergen Reactions    Seasonal      Current Outpatient Medications   Medication Sig Dispense Refill    Multiple Vitamin (MULTI VITAMIN DAILY PO) Take by mouth      Ascorbic Acid (VITAMIN C PO) Take by mouth      VITAMIN D PO Take by mouth      Probiotic Product (PROBIOTIC PO) Take by mouth      b complex vitamins capsule Take 1 capsule by mouth daily      cetirizine (ZYRTEC) 10 MG tablet Take 10 mg by mouth daily      meclizine (ANTIVERT) 25 MG tablet Take 1 tablet by mouth 4 times daily as needed for Dizziness 40 tablet 1    fluticasone (FLONASE) 50 MCG/ACT nasal spray USE ONE SPRAY(S) IN EACH NOSTRIL ONCE DAILY 16 g 5    Pseudoephedrine HCl (SUDAFED 12 HOUR PO) Take 1 tablet by mouth daily       montelukast (SINGULAIR) 10 MG tablet TAKE 1 TABLET BY MOUTH ONE TIME A DAY AT NIGHT (Patient not taking: Reported on 4/5/2021) 30 tablet 0    albuterol sulfate HFA (VENTOLIN HFA) 108 (90 Base) MCG/ACT inhaler Inhale 2 puffs into the lungs every 6 hours as needed for Wheezing (Patient not taking: Reported on 3/29/2021) 1 Inhaler 3    albuterol (PROVENTIL) (2.5 MG/3ML) 0.083% nebulizer solution Take 3 mLs by nebulization 4 times daily (Patient not taking: Reported on 3/29/2021) 1 Package 3     No current facility-administered medications for this visit. Past Medical History:   Diagnosis Date    Asthma     Panic attacks 4/98    Seasonal allergies 4/99      Past Surgical History:   Procedure Laterality Date    INTRAUTERINE DEVICE INSERTION      murena    TUBAL LIGATION  7/8/14    Dr Pilar Sheppard     Family History   Problem Relation Age of Onset    Cancer Mother 46        breast   cancer     Social History     Tobacco Use    Smoking status: Never Smoker    Smokeless tobacco: Never Used   Substance Use Topics    Alcohol use: Yes     Comment: Rarely         Subjective:      Review of Systems  Rest of review of systems are negative, except as noted in HPI. Objective:     /80 (Site: Right Upper Arm, Position: Sitting)   Pulse 84   Temp 98.4 °F (36.9 °C) (Infrared)   Resp 14   Ht 5' 3\" (1.6 m)   Wt 183 lb (83 kg)   SpO2 97%   BMI 32.42 kg/m²     Physical Exam     Vitals reviewed. Ct Facial Bones Wo Contrast    Result Date: 4/2/2021   1. Near complete opacification of the right maxillary sinus. The drainage passageway is opacified. 2. The left middle nasal turbinate and superior nasal turbinates abut the septum. The nasal septum deviates to the left. **This report has been created using voice recognition software. It may contain minor errors which are inherent in voice recognition technology. ** Final report electronically signed by Dr. Derrick Bolanos on 4/2/2021 9:08 AM     Lab Results   Component Value Date     03/05/2021    K 4.2 03/05/2021     03/05/2021    CO2 22 03/05/2021    BUN 8 03/05/2021 CREATININE 0.74 03/05/2021    CALCIUM 9.2 03/05/2021    LABALBU 4.2 03/05/2021    BILITOT 0.5 03/05/2021    ALKPHOS 78 03/05/2021    AST 22 03/05/2021    ALT 23 03/05/2021       All of the past medical history, past surgical history, family history,social history, allergies and current medications were reviewed with the patient. Assessment & Plan   Diagnoses and all orders for this visit:     Diagnosis Orders   1. Chronic maxillary sinusitis  MT NASAL SCOPY,RMV TISS MAXILL SINUS   2. Chronic ethmoiditis  MT NASAL/SINUS NDSC W/PARTIAL ETHMOIDECTOMY   3. Abbey bullosa  MT NASAL/SINUS SCOPY,RMV ABBEY BULL         Based on the patient's history and these physical findings as well as her CT findings, I recommend a right-sided maxillary sinus antrostomy with a resection of the Osborne Gardenia cell on that side as well as surrounding ethmoid air cells of the anterior system using IGS computer CT guidance to reduce the risk to her eye for that procedure. In addition I recommend taking down her abbey bullosa on the left side to reduce her left-sided symptoms which may be on the order of acute recurring sinusitis that is not leaving mucous membrane thickening as an indicator of a more chronic process. I reviewed the indications benefits limitations and risks of proceeding in this manner with the patient and her , who was with us the entire time. The risks I described include but not limited to a very small risk to her right orbit and eye, bleeding, recurrent infection, need for revision maxillary sinus or ethmoid sinus surgery, and need for any restorative surgery of any or all of these problems among others. The patient reported understanding these of the risks and wished to proceed. Return in about 2 months (around 6/5/2021) for A 2-week postop follow-up for nasal endoscopy and debridement.        Spent over 30 minutes of face-to-face time with the patient reviewing her CT scan findings and crafting this therapeutic plan. **This report has been created using voice recognition software. It may contain minor errors which are inherent in voice recognition technology. **

## 2021-04-05 NOTE — TELEPHONE ENCOUNTER
Appointment For: Farzaneh Cesar ([id number])   Visit Type: [facility]  ([id number])      4/5/2021    10:45 AM  15 mins. Salvador Llamas MD                [facility]      Patient Comments:   Discuss care plan

## 2021-04-23 ENCOUNTER — HOSPITAL ENCOUNTER (OUTPATIENT)
Age: 46
Discharge: HOME | End: 2021-04-23
Payer: COMMERCIAL

## 2021-04-23 LAB
INFLUENZA A: NOT DETECTED
INFLUENZA B: NOT DETECTED
SARS-COV-2 RNA, RT PCR: NOT DETECTED

## 2021-04-23 PROCEDURE — 87636 SARSCOV2 & INF A&B AMP PRB: CPT

## 2021-04-26 NOTE — PROGRESS NOTES

## 2021-04-29 NOTE — PROGRESS NOTES
Patient contacted regarding COVID-19 screen. Following questions asked: In the last month, have you been in contact with someone who was confirmed or suspected to have Coronavirus/COVID-19:  Patient stated NO    Do you or family members have any of the following symptoms:  Cough-no   Muscle pain-no   Shortness of breath-no   Fever-no   Weakness-no  Severe headache-no   Sore throat-no   Respiratory symptoms-no  Loss of taste or smell - no     Have you traveled internationally in the last month?  No     Have you been to the emergency room recently-no

## 2021-04-30 ENCOUNTER — ANESTHESIA (OUTPATIENT)
Dept: OPERATING ROOM | Age: 46
End: 2021-04-30
Payer: COMMERCIAL

## 2021-04-30 ENCOUNTER — HOSPITAL ENCOUNTER (OUTPATIENT)
Age: 46
Setting detail: OUTPATIENT SURGERY
Discharge: HOME | End: 2021-04-30
Attending: OTOLARYNGOLOGY | Admitting: OTOLARYNGOLOGY
Payer: COMMERCIAL

## 2021-04-30 ENCOUNTER — ANESTHESIA EVENT (OUTPATIENT)
Dept: OPERATING ROOM | Age: 46
End: 2021-04-30
Payer: COMMERCIAL

## 2021-04-30 VITALS
RESPIRATION RATE: 18 BRPM | HEIGHT: 63 IN | BODY MASS INDEX: 32.6 KG/M2 | DIASTOLIC BLOOD PRESSURE: 78 MMHG | OXYGEN SATURATION: 96 % | TEMPERATURE: 97.7 F | WEIGHT: 184 LBS | SYSTOLIC BLOOD PRESSURE: 126 MMHG | HEART RATE: 69 BPM

## 2021-04-30 VITALS — DIASTOLIC BLOOD PRESSURE: 116 MMHG | SYSTOLIC BLOOD PRESSURE: 156 MMHG | OXYGEN SATURATION: 100 %

## 2021-04-30 DIAGNOSIS — Z98.890 STATUS POST FUNCTIONAL ENDOSCOPIC SINUS SURGERY (FESS): Primary | ICD-10-CM

## 2021-04-30 PROCEDURE — 6360000002 HC RX W HCPCS: Performed by: NURSE ANESTHETIST, CERTIFIED REGISTERED

## 2021-04-30 PROCEDURE — 6370000000 HC RX 637 (ALT 250 FOR IP): Performed by: OTOLARYNGOLOGY

## 2021-04-30 PROCEDURE — 61782 SCAN PROC CRANIAL EXTRA: CPT | Performed by: OTOLARYNGOLOGY

## 2021-04-30 PROCEDURE — 3700000000 HC ANESTHESIA ATTENDED CARE: Performed by: OTOLARYNGOLOGY

## 2021-04-30 PROCEDURE — 88305 TISSUE EXAM BY PATHOLOGIST: CPT

## 2021-04-30 PROCEDURE — 7100000010 HC PHASE II RECOVERY - FIRST 15 MIN: Performed by: OTOLARYNGOLOGY

## 2021-04-30 PROCEDURE — 6360000002 HC RX W HCPCS: Performed by: OTOLARYNGOLOGY

## 2021-04-30 PROCEDURE — 7100000011 HC PHASE II RECOVERY - ADDTL 15 MIN: Performed by: OTOLARYNGOLOGY

## 2021-04-30 PROCEDURE — 7100000001 HC PACU RECOVERY - ADDTL 15 MIN: Performed by: OTOLARYNGOLOGY

## 2021-04-30 PROCEDURE — 7100000000 HC PACU RECOVERY - FIRST 15 MIN: Performed by: OTOLARYNGOLOGY

## 2021-04-30 PROCEDURE — 2500000003 HC RX 250 WO HCPCS: Performed by: NURSE ANESTHETIST, CERTIFIED REGISTERED

## 2021-04-30 PROCEDURE — 6370000000 HC RX 637 (ALT 250 FOR IP)

## 2021-04-30 PROCEDURE — 2709999900 HC NON-CHARGEABLE SUPPLY: Performed by: OTOLARYNGOLOGY

## 2021-04-30 PROCEDURE — 3600000014 HC SURGERY LEVEL 4 ADDTL 15MIN: Performed by: OTOLARYNGOLOGY

## 2021-04-30 PROCEDURE — 3600000004 HC SURGERY LEVEL 4 BASE: Performed by: OTOLARYNGOLOGY

## 2021-04-30 PROCEDURE — 31240 NSL/SNS NDSC CNCH BULL RESCJ: CPT | Performed by: OTOLARYNGOLOGY

## 2021-04-30 PROCEDURE — 3700000001 HC ADD 15 MINUTES (ANESTHESIA): Performed by: OTOLARYNGOLOGY

## 2021-04-30 PROCEDURE — 2720000010 HC SURG SUPPLY STERILE: Performed by: OTOLARYNGOLOGY

## 2021-04-30 PROCEDURE — 31254 NSL/SINS NDSC W/PRTL ETHMDCT: CPT | Performed by: OTOLARYNGOLOGY

## 2021-04-30 PROCEDURE — 2580000003 HC RX 258: Performed by: OTOLARYNGOLOGY

## 2021-04-30 PROCEDURE — 31267 ENDOSCOPY MAXILLARY SINUS: CPT | Performed by: OTOLARYNGOLOGY

## 2021-04-30 RX ORDER — ONDANSETRON 2 MG/ML
INJECTION INTRAMUSCULAR; INTRAVENOUS PRN
Status: DISCONTINUED | OUTPATIENT
Start: 2021-04-30 | End: 2021-04-30 | Stop reason: SDUPTHER

## 2021-04-30 RX ORDER — MIDAZOLAM HYDROCHLORIDE 1 MG/ML
INJECTION INTRAMUSCULAR; INTRAVENOUS PRN
Status: DISCONTINUED | OUTPATIENT
Start: 2021-04-30 | End: 2021-04-30 | Stop reason: SDUPTHER

## 2021-04-30 RX ORDER — CIPROFLOXACIN 500 MG/1
500 TABLET, FILM COATED ORAL 2 TIMES DAILY
Qty: 20 TABLET | Refills: 0 | Status: SHIPPED | OUTPATIENT
Start: 2021-04-30 | End: 2021-05-10

## 2021-04-30 RX ORDER — MINERAL OIL AND WHITE PETROLATUM 150; 830 MG/G; MG/G
OINTMENT OPHTHALMIC PRN
Status: DISCONTINUED | OUTPATIENT
Start: 2021-04-30 | End: 2021-04-30 | Stop reason: ALTCHOICE

## 2021-04-30 RX ORDER — CEFAZOLIN SODIUM 1 G/50ML
1000 INJECTION, SOLUTION INTRAVENOUS
Status: COMPLETED | OUTPATIENT
Start: 2021-04-30 | End: 2021-04-30

## 2021-04-30 RX ORDER — PROPOFOL 10 MG/ML
INJECTION, EMULSION INTRAVENOUS PRN
Status: DISCONTINUED | OUTPATIENT
Start: 2021-04-30 | End: 2021-04-30 | Stop reason: SDUPTHER

## 2021-04-30 RX ORDER — SODIUM CHLORIDE 9 MG/ML
INJECTION, SOLUTION INTRAVENOUS CONTINUOUS
Status: DISCONTINUED | OUTPATIENT
Start: 2021-04-30 | End: 2021-04-30 | Stop reason: HOSPADM

## 2021-04-30 RX ORDER — OXYCODONE HYDROCHLORIDE AND ACETAMINOPHEN 5; 325 MG/1; MG/1
1 TABLET ORAL ONCE
Status: COMPLETED | OUTPATIENT
Start: 2021-04-30 | End: 2021-04-30

## 2021-04-30 RX ORDER — IBUPROFEN 400 MG/1
400 TABLET ORAL ONCE
Status: DISCONTINUED | OUTPATIENT
Start: 2021-04-30 | End: 2021-04-30 | Stop reason: HOSPADM

## 2021-04-30 RX ORDER — GLYCOPYRROLATE 1 MG/5 ML
SYRINGE (ML) INTRAVENOUS PRN
Status: DISCONTINUED | OUTPATIENT
Start: 2021-04-30 | End: 2021-04-30 | Stop reason: SDUPTHER

## 2021-04-30 RX ORDER — SCOLOPAMINE TRANSDERMAL SYSTEM 1 MG/1
1 PATCH, EXTENDED RELEASE TRANSDERMAL
Status: DISCONTINUED | OUTPATIENT
Start: 2021-04-30 | End: 2021-04-30 | Stop reason: HOSPADM

## 2021-04-30 RX ORDER — GINSENG 100 MG
CAPSULE ORAL PRN
Status: DISCONTINUED | OUTPATIENT
Start: 2021-04-30 | End: 2021-04-30 | Stop reason: ALTCHOICE

## 2021-04-30 RX ORDER — LABETALOL 20 MG/4 ML (5 MG/ML) INTRAVENOUS SYRINGE
5 EVERY 10 MIN PRN
Status: DISCONTINUED | OUTPATIENT
Start: 2021-04-30 | End: 2021-04-30 | Stop reason: HOSPADM

## 2021-04-30 RX ORDER — ONDANSETRON 2 MG/ML
4 INJECTION INTRAMUSCULAR; INTRAVENOUS
Status: DISCONTINUED | OUTPATIENT
Start: 2021-04-30 | End: 2021-04-30 | Stop reason: HOSPADM

## 2021-04-30 RX ORDER — SCOLOPAMINE TRANSDERMAL SYSTEM 1 MG/1
PATCH, EXTENDED RELEASE TRANSDERMAL
Status: DISCONTINUED
Start: 2021-04-30 | End: 2021-04-30 | Stop reason: HOSPADM

## 2021-04-30 RX ORDER — FENTANYL CITRATE 50 UG/ML
50 INJECTION, SOLUTION INTRAMUSCULAR; INTRAVENOUS EVERY 5 MIN PRN
Status: DISCONTINUED | OUTPATIENT
Start: 2021-04-30 | End: 2021-04-30 | Stop reason: HOSPADM

## 2021-04-30 RX ORDER — LIDOCAINE HCL/PF 100 MG/5ML
SYRINGE (ML) INJECTION PRN
Status: DISCONTINUED | OUTPATIENT
Start: 2021-04-30 | End: 2021-04-30 | Stop reason: SDUPTHER

## 2021-04-30 RX ORDER — NEOSTIGMINE METHYLSULFATE 5 MG/5 ML
SYRINGE (ML) INTRAVENOUS PRN
Status: DISCONTINUED | OUTPATIENT
Start: 2021-04-30 | End: 2021-04-30 | Stop reason: SDUPTHER

## 2021-04-30 RX ORDER — FENTANYL CITRATE 50 UG/ML
INJECTION, SOLUTION INTRAMUSCULAR; INTRAVENOUS PRN
Status: DISCONTINUED | OUTPATIENT
Start: 2021-04-30 | End: 2021-04-30 | Stop reason: SDUPTHER

## 2021-04-30 RX ORDER — OXYCODONE HYDROCHLORIDE AND ACETAMINOPHEN 5; 325 MG/1; MG/1
1 TABLET ORAL EVERY 6 HOURS PRN
Qty: 20 TABLET | Refills: 0 | Status: SHIPPED | OUTPATIENT
Start: 2021-04-30 | End: 2021-05-05

## 2021-04-30 RX ORDER — KETOROLAC TROMETHAMINE 10 MG/1
10 TABLET, FILM COATED ORAL EVERY 6 HOURS PRN
Qty: 20 TABLET | Refills: 0 | Status: SHIPPED | OUTPATIENT
Start: 2021-04-30 | End: 2021-05-10

## 2021-04-30 RX ORDER — MEPERIDINE HYDROCHLORIDE 25 MG/ML
12.5 INJECTION INTRAMUSCULAR; INTRAVENOUS; SUBCUTANEOUS EVERY 5 MIN PRN
Status: DISCONTINUED | OUTPATIENT
Start: 2021-04-30 | End: 2021-04-30 | Stop reason: HOSPADM

## 2021-04-30 RX ORDER — DEXAMETHASONE SODIUM PHOSPHATE 10 MG/ML
INJECTION, EMULSION INTRAMUSCULAR; INTRAVENOUS PRN
Status: DISCONTINUED | OUTPATIENT
Start: 2021-04-30 | End: 2021-04-30 | Stop reason: SDUPTHER

## 2021-04-30 RX ORDER — OXYMETAZOLINE HYDROCHLORIDE 0.05 G/100ML
SPRAY NASAL PRN
Status: DISCONTINUED | OUTPATIENT
Start: 2021-04-30 | End: 2021-04-30 | Stop reason: ALTCHOICE

## 2021-04-30 RX ORDER — PROMETHAZINE HYDROCHLORIDE 25 MG/ML
6.25 INJECTION, SOLUTION INTRAMUSCULAR; INTRAVENOUS
Status: DISCONTINUED | OUTPATIENT
Start: 2021-04-30 | End: 2021-04-30 | Stop reason: HOSPADM

## 2021-04-30 RX ORDER — ROCURONIUM BROMIDE 10 MG/ML
INJECTION, SOLUTION INTRAVENOUS PRN
Status: DISCONTINUED | OUTPATIENT
Start: 2021-04-30 | End: 2021-04-30 | Stop reason: SDUPTHER

## 2021-04-30 RX ORDER — FENTANYL CITRATE 50 UG/ML
25 INJECTION, SOLUTION INTRAMUSCULAR; INTRAVENOUS EVERY 5 MIN PRN
Status: DISCONTINUED | OUTPATIENT
Start: 2021-04-30 | End: 2021-04-30 | Stop reason: HOSPADM

## 2021-04-30 RX ADMIN — SODIUM CHLORIDE: 9 INJECTION, SOLUTION INTRAVENOUS at 06:22

## 2021-04-30 RX ADMIN — MIDAZOLAM 2 MG: 1 INJECTION INTRAMUSCULAR; INTRAVENOUS at 07:49

## 2021-04-30 RX ADMIN — DEXAMETHASONE SODIUM PHOSPHATE 10 MG: 10 INJECTION, EMULSION INTRAMUSCULAR; INTRAVENOUS at 08:07

## 2021-04-30 RX ADMIN — Medication 0.6 MG: at 09:28

## 2021-04-30 RX ADMIN — ROCURONIUM BROMIDE 50 MG: 10 INJECTION INTRAVENOUS at 07:51

## 2021-04-30 RX ADMIN — Medication 4 MG: at 09:28

## 2021-04-30 RX ADMIN — Medication 100 MG: at 07:51

## 2021-04-30 RX ADMIN — OXYCODONE HYDROCHLORIDE AND ACETAMINOPHEN 1 TABLET: 5; 325 TABLET ORAL at 10:50

## 2021-04-30 RX ADMIN — CEFAZOLIN SODIUM 1000 MG: 1 INJECTION, SOLUTION INTRAVENOUS at 08:11

## 2021-04-30 RX ADMIN — FENTANYL CITRATE 100 MCG: 50 INJECTION, SOLUTION INTRAMUSCULAR; INTRAVENOUS at 07:51

## 2021-04-30 RX ADMIN — ONDANSETRON HYDROCHLORIDE 4 MG: 4 INJECTION, SOLUTION INTRAMUSCULAR; INTRAVENOUS at 09:23

## 2021-04-30 RX ADMIN — PROPOFOL 150 MG: 10 INJECTION, EMULSION INTRAVENOUS at 07:51

## 2021-04-30 ASSESSMENT — PULMONARY FUNCTION TESTS
PIF_VALUE: 16
PIF_VALUE: 17
PIF_VALUE: 21
PIF_VALUE: 16
PIF_VALUE: 17
PIF_VALUE: 23
PIF_VALUE: 16
PIF_VALUE: 3
PIF_VALUE: 16
PIF_VALUE: 16
PIF_VALUE: 15
PIF_VALUE: 16
PIF_VALUE: 18
PIF_VALUE: 16
PIF_VALUE: 3
PIF_VALUE: 16
PIF_VALUE: 1
PIF_VALUE: 18
PIF_VALUE: 16
PIF_VALUE: 15
PIF_VALUE: 8
PIF_VALUE: 20
PIF_VALUE: 16
PIF_VALUE: 22
PIF_VALUE: 16
PIF_VALUE: 15
PIF_VALUE: 16
PIF_VALUE: 1
PIF_VALUE: 14
PIF_VALUE: 16
PIF_VALUE: 15
PIF_VALUE: 16
PIF_VALUE: 17
PIF_VALUE: 1
PIF_VALUE: 16
PIF_VALUE: 16

## 2021-04-30 ASSESSMENT — PAIN - FUNCTIONAL ASSESSMENT: PAIN_FUNCTIONAL_ASSESSMENT: 0-10

## 2021-04-30 ASSESSMENT — PAIN DESCRIPTION - PAIN TYPE: TYPE: SURGICAL PAIN

## 2021-04-30 ASSESSMENT — PAIN SCALES - GENERAL
PAINLEVEL_OUTOF10: 7
PAINLEVEL_OUTOF10: 8
PAINLEVEL_OUTOF10: 6
PAINLEVEL_OUTOF10: 3
PAINLEVEL_OUTOF10: 8
PAINLEVEL_OUTOF10: 3
PAINLEVEL_OUTOF10: 0

## 2021-04-30 ASSESSMENT — PAIN DESCRIPTION - FREQUENCY: FREQUENCY: CONTINUOUS

## 2021-04-30 NOTE — ANESTHESIA PRE PROCEDURE
(2.5 MG/3ML) 0.083% nebulizer solution Take 3 mLs by nebulization 4 times daily  Patient not taking: Reported on 3/29/2021 2/3/20   Levan Dance, MD       Current medications:    Current Facility-Administered Medications   Medication Dose Route Frequency Provider Last Rate Last Admin    0.9 % sodium chloride infusion   Intravenous Continuous Gena Vasquez  mL/hr at 04/30/21 0622 New Bag at 04/30/21 0622    ceFAZolin (ANCEF) 1000 mg in dextrose 5 % 50 mL IVPB (premix)  1,000 mg Intravenous On Call to Claribel Fragoso MD        scopolamine (TRANSDERM-SCOP) transdermal patch 1 patch  1 patch Transdermal Q72H Aida Moreno MD   1 patch at 04/30/21 0657       Allergies:     Allergies   Allergen Reactions    Seasonal        Problem List:    Patient Active Problem List   Diagnosis Code    Seasonal allergies J30.2    Panic attacks F41.0    Asthma J45.909    Acute non-recurrent ethmoidal sinusitis J01.20    Chronic pansinusitis J32.4    Nasal obstruction J34.89    Nasal valve collapse J34.89    Obstructive sleep apnea G47.33    Chronic maxillary sinusitis J32.0    Chronic ethmoiditis J32.2    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       Past Medical History:        Diagnosis Date    Asthma     Panic attacks 4/98    PONV (postoperative nausea and vomiting)     Seasonal allergies 4/99    Sinusitis, chronic        Past Surgical History:        Procedure Laterality Date    CYST REMOVAL      INTRAUTERINE DEVICE INSERTION      murena    TUBAL LIGATION  7/8/14    Dr Leana Wick       Social History:    Social History     Tobacco Use    Smoking status: Never Smoker    Smokeless tobacco: Never Used   Substance Use Topics    Alcohol use: Not Currently     Comment: Rarely                                 Counseling given: Not Answered      Vital Signs (Current):   Vitals:    04/26/21 1103 04/30/21 0557 04/30/21 0605   BP:  131/89    Pulse:  93    Resp:  16    Temp:  98.5 °F (36.9 °C)    SpO2:  97%    Weight: 183 lb (83 kg)  184 lb (83.5 kg)   Height: 5' 3\" (1.6 m)  5' 3\" (1.6 m)                                              BP Readings from Last 3 Encounters:   04/30/21 131/89   04/05/21 128/80   03/29/21 128/72       NPO Status: Time of last liquid consumption: 2100                        Time of last solid consumption: 2100                        Date of last liquid consumption: 04/29/21                        Date of last solid food consumption: 04/29/21    BMI:   Wt Readings from Last 3 Encounters:   04/30/21 184 lb (83.5 kg)   04/05/21 183 lb (83 kg)   03/29/21 185 lb (83.9 kg)     Body mass index is 32.59 kg/m². CBC:   Lab Results   Component Value Date    WBC 5.8 03/05/2021    RBC 4.79 03/05/2021    HGB 14.4 03/05/2021    HCT 42.5 03/05/2021    MCV 89 03/05/2021     03/05/2021       CMP:   Lab Results   Component Value Date     03/05/2021    K 4.2 03/05/2021     03/05/2021    CO2 22 03/05/2021    BUN 8 03/05/2021    CREATININE 0.74 03/05/2021    GLUCOSE 90 03/05/2021    CALCIUM 9.2 03/05/2021    BILITOT 0.5 03/05/2021    ALKPHOS 78 03/05/2021    AST 22 03/05/2021    ALT 23 03/05/2021       POC Tests: No results for input(s): POCGLU, POCNA, POCK, POCCL, POCBUN, POCHEMO, POCHCT in the last 72 hours. Coags: No results found for: PROTIME, INR, APTT    HCG (If Applicable): No results found for: PREGTESTUR, PREGSERUM, HCG, HCGQUANT     ABGs: No results found for: PHART, PO2ART, YKB2HXM, FJW3NDE, BEART, W6EVDRBU     Type & Screen (If Applicable):  No results found for: LABABO, LABRH    Drug/Infectious Status (If Applicable):  No results found for: HIV, HEPCAB    COVID-19 Screening (If Applicable):   Lab Results   Component Value Date    COVID19 NOT DETECTED 04/23/2021           Anesthesia Evaluation     history of anesthetic complications: PONV.   Airway: Mallampati: II  TM distance: >3 FB   Neck ROM: full  Mouth opening: > = 3 FB Dental:          Pulmonary:normal exam    (+) sleep apnea:  asthma: Patient did not smoke on day of surgery. Cardiovascular:  Exercise tolerance: good (>4 METS),                     Neuro/Psych:   Negative Neuro/Psych ROS              GI/Hepatic/Renal: Neg GI/Hepatic/Renal ROS            Endo/Other: Negative Endo/Other ROS             Pt had no PAT visit       Abdominal:           Vascular: negative vascular ROS. Anesthesia Plan      general     ASA 2       Induction: intravenous. MIPS: Postoperative opioids intended and Prophylactic antiemetics administered. Anesthetic plan and risks discussed with patient. Plan discussed with CRNA.                   Oly Welch MD   4/30/2021

## 2021-04-30 NOTE — ANESTHESIA POSTPROCEDURE EVALUATION
Department of Anesthesiology  Postprocedure Note    Patient: Mari Jenkins  MRN: [id number]  YOB: 1975  Date of evaluation: 4/30/2021  Time:  11:11 AM     Procedure Summary     Date: 04/30/21 Room / Location: [address] REN[place] Velásquez    Anesthesia Start: 0237 Anesthesia Stop: 60 920 06 98    Procedure: SINUS ENDOSCOPY FUNCTIONAL SURGERY IMAGE GUIDED, RIGHT MAXILLARY ANTROSTOMY WITH REMOVAL OF TISSUE FROM MAXILLARY SINUS, RIGHT PARTIAL ANTERIOR ETHMOIDECTOMY, NASAL ENDOSCOPY OF LEFT ABBEY BULLOSA (Bilateral Nose) Diagnosis: (CHRONIC MAXILLARY SINUSITIS, CHRONIC ETHMOIDITIS)    Surgeons: Shanda Greenberg MD Responsible Provider: Maegan Hassan MD    Anesthesia Type: general ASA Status: 2          Anesthesia Type: general    Yael Phase I: Yael Score: 10    Yael Phase II: Yael Score: 10    Last vitals: Reviewed and per EMR flowsheets.        Anesthesia Post Evaluation    Patient location during evaluation: PACU  Patient participation: complete - patient participated  Level of consciousness: awake and alert  Airway patency: patent  Nausea & Vomiting: no nausea  Complications: no  Cardiovascular status: blood pressure returned to baseline and hemodynamically stable  Respiratory status: acceptable and spontaneous ventilation  Hydration status: euvolemic

## 2021-04-30 NOTE — OP NOTE
Operative Note      Patient: Paola Velazquez  YOB: 1975  MRN: [id number]    Date of Procedure: 4/30/2021    Pre-Op Diagnosis: CHRONIC MAXILLARY SINUSITIS, CHRONIC ETHMOIDITIS    Post-Op Diagnosis: Same; left abbey bullosa       Procedure(s):  SINUS ENDOSCOPY FUNCTIONAL SURGERY IMAGE GUIDED, RIGHT MAXILLARY ANTROSTOMY WITH REMOVAL OF TISSUE FROM MAXILLARY SINUS, RIGHT PARTIAL ANTERIOR ETHMOIDECTOMY, NASAL ENDOSCOPY OF LEFT ABBEY BULLOSA    Surgeon(s):  Rd Alatorre MD    Assistant:   * No surgical staff found *    Anesthesia: General    Estimated Blood Loss (mL): less than 50     Complications: None    Specimens:   ID Type Source Tests Collected by Time Destination   A : left abbey bullosa  Tissue Sinus SURGICAL PATHOLOGY Rd Alatorre MD 4/30/2021 5952    B : right middle turbinate Tissue Sinus SURGICAL PATHOLOGY Rd Alatorre MD 4/30/2021 5456        Implants:  * No implants in log *      Drains: * No LDAs found *    Findings: 1. Complex right middle meatus anatomy consistent with obstructive pathology of the right maxillary sinus  2. Large obstructive abbey bullosa of the left middle turbinate; resected    Detailed Description of Procedure: The patient was taken the operating room awake and placed in supine position. General anesthesia was induced and the patient was intubated with a 7.0 endotracheal tube by the anesthesiologist on the first attempt without difficulty or vital sign instability. A timeout was then employed verifying the patient's identity and planned procedure. I turned the table 90 degrees for the procedure. I had her prepped and draped in the usual fashion for a transnasal operation. I set up and verified the CT computer guidance system to as tight a degree of accuracy as possible. It was used throughout the case for anatomical localization.     Left abbey bullosa resection: Beginning the operation by placing Afrin pledgets bilaterally, I infractured the right middle turbinate and placed an Afrin pledget within the middle meatus prior to turning my attention to the left side for the main bullosa resection. Injecting approximately 1 cc of 1-50,000 saline epinephrine into the base of the middle turbinate, I used turbinate scissors to make a longitudinal incision followed by a flanking inferior directed incision with left going turbinate scissors allowing me to incise the middle turbinate from its posterior morning. I used suction cautery to achieve hemostasis. I handed the specimen off with an additional portion of the inferior turbinate removed later in the case to be placed in formalin for permanent section. Right anterior ethmoidectomy and maxillary antrostomy with soft tissue removal: After placing Afrin pledgets in the left nasal airway, I turned my attention back to the right side. I used a 0 degree debrider blade to enter the bulla ethmoidalis and then broadly opened the anterior ethmoid air cells in the region of the maxillary sinus in order to deal with the confluence of obstructed paranasal sinus elements around the maxillary sinus antrum to more definitively treat her disease. Once done I found to large paranasal sinus polyps within the ethmoid air cell system literally laying over the patient's natural sinus ostium and definitely contributing to its pathology. Once I removed these polyps, a brisk egress of purulent fluid was noted. I then used backbiting and forward through biting forceps to widen the maxillary sinus ostium followed by the debrider to widen the ostium yet further to many times its original size. I irrigated out the maxillary sinus in the process. I looked to the inside of the sinus with an angled telescope and saw no retention cysts within the sinus vault itself. I achieved hemostasis with suction cautery.   I irrigated out the nasal airways bilaterally and then placed fibrin paste within the ethmoid air cell system on

## 2021-04-30 NOTE — H&P
Ascorbic Acid (VITAMIN C PO) Take by mouth        VITAMIN D PO Take by mouth        Probiotic Product (PROBIOTIC PO) Take by mouth        b complex vitamins capsule Take 1 capsule by mouth daily        cetirizine (ZYRTEC) 10 MG tablet Take 10 mg by mouth daily        meclizine (ANTIVERT) 25 MG tablet Take 1 tablet by mouth 4 times daily as needed for Dizziness 40 tablet 1    fluticasone (FLONASE) 50 MCG/ACT nasal spray USE ONE SPRAY(S) IN EACH NOSTRIL ONCE DAILY 16 g 5    Pseudoephedrine HCl (SUDAFED 12 HOUR PO) Take 1 tablet by mouth daily         montelukast (SINGULAIR) 10 MG tablet TAKE 1 TABLET BY MOUTH ONE TIME A DAY AT NIGHT (Patient not taking: Reported on 4/5/2021) 30 tablet 0    albuterol sulfate HFA (VENTOLIN HFA) 108 (90 Base) MCG/ACT inhaler Inhale 2 puffs into the lungs every 6 hours as needed for Wheezing (Patient not taking: Reported on 3/29/2021) 1 Inhaler 3    albuterol (PROVENTIL) (2.5 MG/3ML) 0.083% nebulizer solution Take 3 mLs by nebulization 4 times daily (Patient not taking: Reported on 3/29/2021) 1 Package 3      No current facility-administered medications for this visit.          Past Medical History        Past Medical History:   Diagnosis Date    Asthma      Panic attacks 4/98    Seasonal allergies 4/99         Past Surgical History         Past Surgical History:   Procedure Laterality Date    INTRAUTERINE DEVICE INSERTION         murena    TUBAL LIGATION   7/8/14     [facility]         Family History         Family History   Problem Relation Age of Onset    Cancer Mother 46         breast   cancer         Social History            Tobacco Use    Smoking status: Never Smoker    Smokeless tobacco: Never Used   Substance Use Topics    Alcohol use:  Yes       Comment: Rarely                     Subjective:      Review of Systems  Rest of review of systems are negative, except as noted in HPI.      Objective:      /80 (Site: Right Upper Arm, Position: Sitting)   Pulse sinusitis that is not leaving mucous membrane thickening as an indicator of a more chronic process.     I reviewed the indications benefits limitations and risks of proceeding in this manner with the patient and her , who was with us the entire time. The risks I described include but not limited to a very small risk to her right orbit and eye, bleeding, recurrent infection, need for revision maxillary sinus or ethmoid sinus surgery, and need for any restorative surgery of any or all of these problems among others. The patient reported understanding these of the risks and wished to proceed.        Return in about 2 months (around 6/5/2021) for A 2-week postop follow-up for nasal endoscopy and debridement.     Spent over 30 minutes of face-to-face time with the patient reviewing her CT scan findings and crafting this therapeutic plan.     **This report has been created using voice recognition software. It may contain minor errors which are inherent in voice recognition technology. **                                       Office Visit on 4/5/2021        Detailed Report        Note viewed by patient  Progress Notes Info    Author Note Status Last Update User   Rd Alatorre MD Signed Rd Alatorre MD   Last Update Date/Time: 4/5/2021 12:14 PM   Chart Review Routing History    No routing history on file.

## 2021-04-30 NOTE — DISCHARGE SUMMARY
Physician Discharge Summary     Patient ID:  Tk Aguila  [id number]  22 y.o.  1975    Admit date: 4/30/2021    Discharge date and time: 4/30/2021 11:35 AM     Admitting Physician: Zina Campbell MD     Discharge Physician: Same    Admission Diagnoses: [address], CHRONIC ETHMOIDITIS    Discharge Diagnoses: Same    Admission Condition: good    Discharged Condition: good    Indication for Admission: IV hydration and pain control    Hospital Course: Uneventful    Consults: none    Significant Diagnostic Studies: None    Treatments: surgery: Right maxillary antrostomy and anterior ethmoidectomy; left main bullosa resection    Discharge Exam:  As per routine    Disposition: home    In process/preliminary results:  Outstanding Order Results     No orders found from 4/1/2021 to 5/1/2021. Patient Instructions:   Discharge Medication List as of 4/30/2021 11:24 AM      START taking these medications    Details   ciprofloxacin (CIPRO) 500 MG tablet Take 1 tablet by mouth 2 times daily for 10 days, Disp-20 tablet, R-0Normal      ketorolac (TORADOL) 10 MG tablet Take 1 tablet by mouth every 6 hours as needed for Pain (Alternate with Percocet), Disp-20 tablet, R-0Normal      oxyCODONE-acetaminophen (PERCOCET) 5-325 MG per tablet Take 1 tablet by mouth every 6 hours as needed for Pain (Alternate with Toradol) for up to 5 days. Intended supply: 7 days.  Take lowest dose possible to manage pain, Disp-20 tablet, R-0Normal         CONTINUE these medications which have NOT CHANGED    Details   Multiple Vitamin (MULTI VITAMIN DAILY PO) Take by mouthHistorical Med      Ascorbic Acid (VITAMIN C PO) Take by mouthHistorical Med      VITAMIN D PO Take by mouthHistorical Med      Probiotic Product (PROBIOTIC PO) Take by mouth 1 tab in morningHistorical Med      b complex vitamins capsule Take 1 capsule by mouth dailyHistorical Med      cetirizine (ZYRTEC) 10 MG tablet Take 10 mg by mouth nightly Historical Med      montelukast (SINGULAIR) 10 MG tablet TAKE 1 TABLET BY MOUTH ONE TIME A DAY AT NIGHT, Disp-30 tablet, R-0Normal      meclizine (ANTIVERT) 25 MG tablet Take 1 tablet by mouth 4 times daily as needed for Dizziness, Disp-40 tablet,R-1Normal      albuterol sulfate HFA (VENTOLIN HFA) 108 (90 Base) MCG/ACT inhaler Inhale 2 puffs into the lungs every 6 hours as needed for Wheezing, Disp-1 Inhaler, R-3Normal      albuterol (PROVENTIL) (2.5 MG/3ML) 0.083% nebulizer solution Take 3 mLs by nebulization 4 times daily, Disp-1 Package,R-3Normal      fluticasone (FLONASE) 50 MCG/ACT nasal spray USE ONE SPRAY(S) IN EACH NOSTRIL ONCE DAILY, Disp-16 g, R-5Normal      Pseudoephedrine HCl (SUDAFED 12 HOUR PO) Take 1 tablet by mouth daily Historical Med           Activity: no lifting, or Strenuous exercise for 1 week  Diet: regular diet  Wound Care: as directed    Follow-up with Dr. Leif Lott in 2 weeks. Signed:  Dominga Ryan.  Leif Lott MD  4/30/2021  4:36 PM

## 2021-04-30 NOTE — PROGRESS NOTES

## 2021-04-30 NOTE — BRIEF OP NOTE
Brief Postoperative Note      Patient: Marta Varela  YOB: 1975  MRN: [id number]    Date of Procedure: 4/30/2021    Pre-Op Diagnosis: CHRONIC MAXILLARY SINUSITIS, CHRONIC ETHMOIDITIS    Post-Op Diagnosis: Same       Procedure(s):  SINUS ENDOSCOPY FUNCTIONAL SURGERY IMAGE GUIDED, RIGHT MAXILLARY ANTROSTOMY WITH REMOVAL OF TISSUE FROM MAXILLARY SINUS, RIGHT PARTIAL ANTERIOR ETHMOIDECTOMY, NASAL ENDOSCOPY OF LEFT ABBEY BULLOSA    Surgeon(s):  Alec Alcaraz MD    Assistant:  * No surgical staff found *    Anesthesia: General    Estimated Blood Loss (mL): less than 50     Complications: None    Specimens:   ID Type Source Tests Collected by Time Destination   A : left abbey bullosa  Tissue Sinus SURGICAL PATHOLOGY Alec Alcaraz MD 4/30/2021 5596    B : right middle turbinate Tissue Sinus SURGICAL PATHOLOGY Alec Alcaraz MD 4/30/2021 8028        Implants:  * No implants in log *      Drains: * No LDAs found *    Findings: 1. Complex right middle meatus anatomy consistent with obstructive pathology of the right maxillary sinus  2.   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

## 2021-05-10 ENCOUNTER — OFFICE VISIT (OUTPATIENT)
Dept: ENT CLINIC | Age: 46
End: 2021-05-10
Payer: COMMERCIAL

## 2021-05-10 VITALS
SYSTOLIC BLOOD PRESSURE: 110 MMHG | RESPIRATION RATE: 14 BRPM | HEART RATE: 88 BPM | BODY MASS INDEX: 33.3 KG/M2 | TEMPERATURE: 96.4 F | WEIGHT: 188 LBS | OXYGEN SATURATION: 98 % | DIASTOLIC BLOOD PRESSURE: 70 MMHG

## 2021-05-10 DIAGNOSIS — Z98.890 STATUS POST FUNCTIONAL ENDOSCOPIC SINUS SURGERY (FESS): Primary | ICD-10-CM

## 2021-05-10 DIAGNOSIS — J30.2 SEASONAL ALLERGIES: ICD-10-CM

## 2021-05-10 PROCEDURE — 31237 NSL/SINS NDSC SURG BX POLYPC: CPT | Performed by: OTOLARYNGOLOGY

## 2021-05-10 NOTE — PROGRESS NOTES
[facility] [place] [facility]  [facility] EAR, NOSE AND THROAT  [address] [place] [address]  Dept: [phone]  Dept Fax: [phone]  Loc: [phone]    Lashae Rosales is a 54 y.o. female who was referred by No ref. provider found for:  Chief Complaint   Patient presents with    Post-Op Check     Patient is here for post-op IGS sinus MAX, [address] [place] [address] 5/6/21       HPI:     Anastacia Metzger is a 54 y.o. female is approximately 11 days status post right-sided maxillary antrostomy and ethmoidectomy as well as left-sided main bullosa resection. The patient's primary problem with her chronic sinusitis and her structural abnormality of the left side has been pressure related sinus headaches that have been getting worse over the past several years. She is here today with her  and reports having done well with only mild to moderate postop pain and an abatement of her headaches apart from the postop pain that she is describing. She has been using her Navage irrigation system and has gotten a lot of \"muck\" out. She has had minimal bleeding and no surgery related complications that she is aware of. History:      Allergies   Allergen Reactions    Seasonal      Current Outpatient Medications   Medication Sig Dispense Refill    ciprofloxacin (CIPRO) 500 MG tablet Take 1 tablet by mouth 2 times daily for 10 days 20 tablet 0    Multiple Vitamin (MULTI VITAMIN DAILY PO) Take by mouth      Ascorbic Acid (VITAMIN C PO) Take by mouth      VITAMIN D PO Take by mouth      Probiotic Product (PROBIOTIC PO) Take by mouth 1 tab in morning      b complex vitamins capsule Take 1 capsule by mouth daily      cetirizine (ZYRTEC) 10 MG tablet Take 10 mg by mouth nightly       montelukast (SINGULAIR) 10 MG tablet TAKE 1 TABLET BY MOUTH ONE TIME A DAY AT NIGHT 30 tablet 0    meclizine (ANTIVERT) 25 MG tablet Take 1 tablet by mouth 4 times daily as needed for Dizziness 40 tablet 1    fluticasone (FLONASE) 50 MCG/ACT nasal spray USE ONE SPRAY(S) IN EACH NOSTRIL ONCE DAILY (Patient taking differently: 2 sprays USE ONE SPRAY(S) IN EACH NOSTRIL ONCE DAILY) 16 g 5    Pseudoephedrine HCl (SUDAFED 12 HOUR PO) Take 1 tablet by mouth daily       ketorolac (TORADOL) 10 MG tablet Take 1 tablet by mouth every 6 hours as needed for Pain (Alternate with Percocet) 20 tablet 0    albuterol sulfate HFA (VENTOLIN HFA) 108 (90 Base) MCG/ACT inhaler Inhale 2 puffs into the lungs every 6 hours as needed for Wheezing (Patient not taking: Reported on 3/29/2021) 1 Inhaler 3    albuterol (PROVENTIL) (2.5 MG/3ML) 0.083% nebulizer solution Take 3 mLs by nebulization 4 times daily (Patient not taking: Reported on 3/29/2021) 1 Package 3     No current facility-administered medications for this visit. Past Medical History:   Diagnosis Date    Asthma     Panic attacks 4/98    PONV (postoperative nausea and vomiting)     Seasonal allergies 4/99    Sinusitis, chronic       Past Surgical History:   Procedure Laterality Date    CYST REMOVAL      INTRAUTERINE DEVICE INSERTION      murena    SINUS ENDOSCOPY  04/2021    dr Greg Layton Bilateral 4/30/2021    SINUS ENDOSCOPY FUNCTIONAL SURGERY IMAGE GUIDED, RIGHT MAXILLARY ANTROSTOMY WITH REMOVAL OF TISSUE FROM MAXILLARY SINUS, RIGHT PARTIAL ANTERIOR ETHMOIDECTOMY, NASAL ENDOSCOPY OF LEFT ABBEY BULLOSA performed by Morales Espinal MD at [facility]  07/08/2014    Dr Maxwell Pair     Family History   Problem Relation Age of Onset    Cancer Mother 46        breast   cancer     Social History     Tobacco Use    Smoking status: Never Smoker    Smokeless tobacco: Never Used   Substance Use Topics    Alcohol use: Not Currently     Comment: Rarely         Subjective:      Review of Systems  Rest of review of systems are negative, except as noted in HPI.      Objective:     /70 (Site: Left Upper Arm, Position: Sitting)   Pulse 88   Temp 96.4 °F (35.8 °C) (Infrared)   Resp 14   Wt 188 lb (85.3 kg)   SpO2 98%   BMI 33.30 kg/m²     Physical Exam       Procedure: Rhinoscopy with debridement; bilateral  Indication: The patient status post extensive paranasal sinus surgery bilaterally and warrants an interval endoscopic evaluation and debridement to improve postoperative healing  Findings: On the patient's right side the patient had significant debris accumulation in the middle meatus and in the maxillary sinus. Of note is the fact that she was developing polypoid degeneration of the maxillary sinus mucosa in a manner consistent with her chronic sinusitis history but not evident on her initial surgical treatment. I also found her to have polypoid degeneration of the ethmoid air cells in the frontal sinus recess. I could see past him into the sinus itself but could not intubate the sinus. I irrigated the area out vigorously and debrided vigorously removing a lot of the dried blood and soft tissue debris in the ethmoid recess. I cleaned up and exercise completely. On the left side the patient's medial lateral walls were in contact with granulation tissue at the tin of her left airway aperture. This was everted a dense synechia that would have formed without this debridement. She also had some dense dry blood in the ethmoid recess post main bullosa resection. I removed a significant amount of this but some of it was so hard and tenacious that she will need to continue softening it with her Navage . She tolerated the procedure well. Vitals reviewed. No results found.    Lab Results   Component Value Date     03/05/2021    K 4.2 03/05/2021     03/05/2021    CO2 22 03/05/2021    BUN 8 03/05/2021    CREATININE 0.74 03/05/2021    CALCIUM 9.2 03/05/2021    LABALBU 4.2 03/05/2021    BILITOT 0.5 03/05/2021    ALKPHOS 78 03/05/2021    AST 22 03/05/2021    ALT 23 03/05/2021       All of the past

## 2021-05-24 ENCOUNTER — TELEPHONE (OUTPATIENT)
Dept: ENT CLINIC | Age: 46
End: 2021-05-24

## 2021-05-24 RX ORDER — CIPROFLOXACIN 500 MG/1
500 TABLET, FILM COATED ORAL 2 TIMES DAILY
Qty: 20 TABLET | Refills: 0 | Status: SHIPPED | OUTPATIENT
Start: 2021-05-24 | End: 2021-06-03

## 2021-05-24 RX ORDER — PSEUDOEPHEDRINE HYDROCHLORIDE 30 MG/1
30 TABLET ORAL EVERY 6 HOURS PRN
Qty: 12 TABLET | Refills: 0 | Status: SHIPPED | OUTPATIENT
Start: 2021-05-24 | End: 2021-05-27

## 2021-05-24 NOTE — TELEPHONE ENCOUNTER
Patient called in stating she believes she is starting to get a sinus infection. Patient stated it started a week ago. Patient complains of congestion and not being able to blow anything out, yellow mucus dripping down her throat, and right eye pain. Patient states her right eye is sore to touch and is puffy. She also stated the bridge of her nose is very painful as well. Patient stated she has been taking her Flonase and doing her rinses like she was told. Please advise.

## 2021-05-24 NOTE — TELEPHONE ENCOUNTER
Discussed with Dr. Jose Fernandes. Order placed for oral antibiotic and decongestant pill. Take these as prescribed and see if this helps her symptoms.

## 2021-06-11 ENCOUNTER — TELEPHONE (OUTPATIENT)
Dept: ENT CLINIC | Age: 46
End: 2021-06-11

## 2021-06-30 ENCOUNTER — OFFICE VISIT (OUTPATIENT)
Dept: ENT CLINIC | Age: 46
End: 2021-06-30
Payer: COMMERCIAL

## 2021-06-30 VITALS
TEMPERATURE: 97.3 F | DIASTOLIC BLOOD PRESSURE: 84 MMHG | OXYGEN SATURATION: 98 % | RESPIRATION RATE: 16 BRPM | SYSTOLIC BLOOD PRESSURE: 118 MMHG | HEART RATE: 83 BPM | WEIGHT: 185.9 LBS | BODY MASS INDEX: 32.93 KG/M2

## 2021-06-30 DIAGNOSIS — J32.4 CHRONIC PANSINUSITIS: Primary | ICD-10-CM

## 2021-06-30 PROCEDURE — 99215 OFFICE O/P EST HI 40 MIN: CPT | Performed by: OTOLARYNGOLOGY

## 2021-06-30 NOTE — PROGRESS NOTES
[facility] [place] [facility]  [facility] EAR, NOSE AND THROAT  [address] [place] [address] [place] [address]  Dept: [phone]  Dept Fax: [phone]  Loc: [phone]    Lashae Bell is a 51 y.o. female who was referred by No ref. provider found for:  Chief Complaint   Patient presents with   Baljinder Raya Post-Op Check     Patient here for post op exam.        HPI:     Violeta Slater is a 51 y.o. female status post extensive endoscopic paranasal sinus surgery April 30, 2021. Her recovery has been punctuated by numerous episodes of nasal obstruction, phantom odors, and relief with the secretion/elimination of very large clotted clumps of purulence and soft tissue debris. She has had some bruising and swelling in the area of the right lacrimal sac well after her operation as well. She also had some eye swelling in that region. Those problems have resolved but the regular episodes of nasal obstruction and elimination of large secretion masses has continued on a daily basis sometimes. The phantom odor she experiences include the sensation of vinegar as the more common odor that no one but her can smell. The other odor is of \"vomit\". History:      Allergies   Allergen Reactions    Seasonal      Current Outpatient Medications   Medication Sig Dispense Refill    Multiple Vitamin (MULTI VITAMIN DAILY PO) Take by mouth      Ascorbic Acid (VITAMIN C PO) Take by mouth      VITAMIN D PO Take by mouth      Probiotic Product (PROBIOTIC PO) Take by mouth 1 tab in morning      b complex vitamins capsule Take 1 capsule by mouth daily      cetirizine (ZYRTEC) 10 MG tablet Take 10 mg by mouth nightly       montelukast (SINGULAIR) 10 MG tablet TAKE 1 TABLET BY MOUTH ONE TIME A DAY AT NIGHT 30 tablet 0    meclizine (ANTIVERT) 25 MG tablet Take 1 tablet by mouth 4 times daily as needed for Dizziness 40 tablet 1    fluticasone (FLONASE) 50 MCG/ACT nasal spray USE ONE SPRAY(S) IN EACH NOSTRIL hyponasal.    Procedure: Nasal endoscopy with debridement  Indication: The patient warrants a second rhinoscopy with debridement given the scale of her soft tissue debris and mucopurulence drainage coupled with intermittent obstruction. Findings: The patient's right nasal vault was free of extensive soft tissue debris and required only minor irrigation and debridement to clear. Her maxillary sinus had numerous polyps forming within it. No other polyps were seen. Her frontal recess was obscured or possibly even completely closed off. Her sphenoidotomy was not visible either. Her left side had significantly more soft tissue debris and inspissated mucus which I cleared with saline irrigation and instrumentation with suctioning. I found her maxillary sinus to be nearly completely closed off with only a small entry point to her maxillary sinus likely being visible. Her frontal recess on the other hand was widely patent. I could not see into her sphenoidotomy either on the side. The patient tolerated the procedure very well. Vitals reviewed. No results found. Lab Results   Component Value Date     03/05/2021    K 4.2 03/05/2021     03/05/2021    CO2 22 03/05/2021    BUN 8 03/05/2021    CREATININE 0.74 03/05/2021    CALCIUM 9.2 03/05/2021    LABALBU 4.2 03/05/2021    BILITOT 0.5 03/05/2021    ALKPHOS 78 03/05/2021    AST 22 03/05/2021    ALT 23 03/05/2021       All of the past medical history, past surgical history, family history,social history, allergies and current medications were reviewed with the patient. Assessment & Plan   Diagnoses and all orders for this visit:     Diagnosis Orders   1. Chronic pansinusitis  CT FACIAL BONES WO CONTRAST         Based on the patient's history and these physical findings, in all likelihood the patient will need revision frontal recess exploration on the right side with or without sphenoid and minimal ethmoid work.   On the left side of frontal

## 2021-07-09 ENCOUNTER — HOSPITAL ENCOUNTER (OUTPATIENT)
Dept: CT IMAGING | Age: 46
Discharge: HOME | End: 2021-07-09
Payer: COMMERCIAL

## 2021-07-09 DIAGNOSIS — J32.4 CHRONIC PANSINUSITIS: ICD-10-CM

## 2021-07-09 PROCEDURE — 70486 CT MAXILLOFACIAL W/O DYE: CPT

## 2021-07-15 ENCOUNTER — TELEPHONE (OUTPATIENT)
Dept: ENT CLINIC | Age: 46
End: 2021-07-15

## 2021-07-15 ENCOUNTER — OFFICE VISIT (OUTPATIENT)
Dept: ALLERGY | Age: 46
End: 2021-07-15
Payer: COMMERCIAL

## 2021-07-15 VITALS
TEMPERATURE: 97.1 F | HEART RATE: 80 BPM | WEIGHT: 186.6 LBS | SYSTOLIC BLOOD PRESSURE: 126 MMHG | RESPIRATION RATE: 14 BRPM | BODY MASS INDEX: 33.06 KG/M2 | DIASTOLIC BLOOD PRESSURE: 70 MMHG | HEIGHT: 63 IN

## 2021-07-15 DIAGNOSIS — J33.9 POLYP, NASAL: ICD-10-CM

## 2021-07-15 DIAGNOSIS — J30.89 NON-SEASONAL ALLERGIC RHINITIS, UNSPECIFIED TRIGGER: Primary | ICD-10-CM

## 2021-07-15 DIAGNOSIS — H10.10 ALLERGIC RHINOCONJUNCTIVITIS: ICD-10-CM

## 2021-07-15 DIAGNOSIS — J33.0 POLYP OF NASAL CAVITY: ICD-10-CM

## 2021-07-15 DIAGNOSIS — J30.9 ALLERGIC RHINOCONJUNCTIVITIS: ICD-10-CM

## 2021-07-15 DIAGNOSIS — J33.9 NASAL POLYPS: ICD-10-CM

## 2021-07-15 PROCEDURE — 99204 OFFICE O/P NEW MOD 45 MIN: CPT | Performed by: NURSE PRACTITIONER

## 2021-07-15 RX ORDER — FLUTICASONE PROPIONATE 93 UG/1
1 SPRAY, METERED NASAL 2 TIMES DAILY
Qty: 6.3 ML | Refills: 5 | Status: SHIPPED | OUTPATIENT
Start: 2021-07-15 | End: 2021-09-29 | Stop reason: SDUPTHER

## 2021-07-15 RX ORDER — EPINEPHRINE 0.3 MG/.3ML
INJECTION SUBCUTANEOUS
Qty: 2 EACH | Refills: 0 | Status: SHIPPED | OUTPATIENT
Start: 2021-07-15

## 2021-07-15 RX ORDER — LORATADINE 10 MG/1
10 TABLET ORAL DAILY
COMMUNITY
End: 2021-09-29 | Stop reason: ALTCHOICE

## 2021-07-15 ASSESSMENT — ENCOUNTER SYMPTOMS
FACIAL SWELLING: 0
APNEA: 0
NAUSEA: 0
VOMITING: 0
CHEST TIGHTNESS: 0
CHOKING: 0
TROUBLE SWALLOWING: 0
COLOR CHANGE: 0
ABDOMINAL PAIN: 0
SINUS PRESSURE: 0
STRIDOR: 0
VOICE CHANGE: 0
SORE THROAT: 0
RHINORRHEA: 1
DIARRHEA: 0
COUGH: 0
SHORTNESS OF BREATH: 0
WHEEZING: 0

## 2021-07-15 NOTE — PROGRESS NOTES
Allergy & Asthma   [address]  [facility] [address], [address]  [address] [facility]  Fax: [phone]          Chief Complaint:   Chief Complaint   Patient presents with    Nasal Polyps     New patient is here for allergies and nasal polyps. c/o yellow nasal drainage when rinsing        HISTORY OF PRESENT ILLNESS: NEW PATIENT TO PRACTICE   51year old female with history of nasal polyposis. Had surgery on April 30 2021 where she had nasal polyps removed by Dr. Zuri Mathews. Patient states that she recently had an office visit at ENT practice where one was snipped the endoscopy from her nasal cavity. Patient states that she has had allergies since she was a young child. She complains now of chronic nasal stuffiness. She states that it happens all year. It typically becomes worse whenever she is around animals including cats. She states is severe in severity. She states that although she had surgery in April she still has a lot of nasal congestion and feels like she has a lot of nasal sinus pressure. She has taken Flonase for several years but states that it does not seem to be very beneficial although she uses it every day. She has also tried Claritin 10 mg daily from 09/01/2020 to 10/31/2020 which was not effective, zyrtec 10 mg daily 11/01/2020 to present, chlorpheniramine 2 mg for last 12 month every 4 hours as needed, meclizine 25 mg every 4 hours as needed, benadryl every 4 hours as needed, and allegra 180 mg every day from 06/01/2020 to 08/30/2020. Patient has not found any antihistamine to be effective in controlling her nasal congestion. She also reports a reduced sense of smell intermittently. Patient denies any nausea, vomiting, fever. She does complain of frequent sinus infections. She states in the last year she had 5 sinus infections and was treated with antibiotics. She states that her eyes itch, burn anterior. She complains of itchy ears. At times they become painful.   She sneezes urgency. Musculoskeletal: Negative for arthralgias, gait problem, neck pain and neck stiffness. Skin: Negative for color change and rash. Allergic/Immunologic: Positive for environmental allergies. Negative for food allergies and immunocompromised state. Neurological: Negative for dizziness, syncope, facial asymmetry, speech difficulty, light-headedness and headaches. Hematological: Negative for adenopathy. Does not bruise/bleed easily. Psychiatric/Behavioral: Negative for confusion and sleep disturbance. The patient is not nervous/anxious. All other systems reviewed and are negative. Past Medical History:    Past Medical History:   Diagnosis Date    Asthma     Panic attacks 4/98    PONV (postoperative nausea and vomiting)     Seasonal allergies 4/99    Sinusitis, chronic        Past Surgical History:    Past Surgical History:   Procedure Laterality Date    CYST REMOVAL      INTRAUTERINE DEVICE INSERTION      murena    SINUS ENDOSCOPY  04/2021    dr Shaunna Guan Bilateral 4/30/2021    SINUS ENDOSCOPY FUNCTIONAL SURGERY IMAGE GUIDED, RIGHT MAXILLARY ANTROSTOMY WITH REMOVAL OF TISSUE FROM MAXILLARY SINUS, RIGHT PARTIAL ANTERIOR ETHMOIDECTOMY, NASAL ENDOSCOPY OF LEFT ABBEY BULLOSA performed by Zehra Clay MD at [address] [place] [address]  07/08/2014    Dr Gerald Pedro       Family History:   Family History   Problem Relation Age of Onset    Cancer Mother 46        breast   cancer       Social History:   Social History     Tobacco Use    Smoking status: Never Smoker    Smokeless tobacco: Never Used   Substance Use Topics    Alcohol use: Not Currently     Comment: Rarely         Allergies: Allergies   Allergen Reactions    Seasonal        Current Medications:   Prior to Visit Medications    Medication Sig Taking?  Authorizing Provider   chlorpheniramine (CHLOR-TRIMETON) 2 MG/5ML syrup Take 2 mg by mouth every 4 hours as needed for Allergies Yes Historical Provider, MD   loratadine (CLARITIN) 10 MG tablet Take 10 mg by mouth daily Yes Historical Provider, MD   Fluticasone Propionate (XHANCE) 93 MCG/ACT EXHU 1 spray by Nasal route 2 times daily Yes RICH Yost CNP   EPINEPHrine (AUVI-Q) 0.3 MG/0.3ML SOAJ injection Dispense 2 packs of 2 (total 4 devices). Use as directed, STAT for allergic reaction. Yes RICH Yost CNP   Multiple Vitamin (MULTI VITAMIN DAILY PO) Take by mouth Yes Historical Provider, MD   Ascorbic Acid (VITAMIN C PO) Take by mouth Yes Historical Provider, MD   VITAMIN D PO Take by mouth Yes Historical Provider, MD   Probiotic Product (PROBIOTIC PO) Take by mouth 1 tab in morning Yes Historical Provider, MD   b complex vitamins capsule Take 1 capsule by mouth daily Yes Historical Provider, MD   cetirizine (ZYRTEC) 10 MG tablet Take 10 mg by mouth nightly  Yes Historical Provider, MD   meclizine (ANTIVERT) 25 MG tablet Take 1 tablet by mouth 4 times daily as needed for Dizziness Yes Joseph Calixto MD   Pseudoephedrine HCl (SUDAFED 12 HOUR PO) Take 1 tablet by mouth daily  Yes Historical Provider, MD       Vitals:  Vitals:    07/15/21 1509   BP: 126/70   Pulse: 80   Resp: 14   Temp: 97.1 °F (36.2 °C)       186 lb 9.6 oz (84.6 kg)           Physical Exam:  Physical Exam  Vitals and nursing note reviewed. Constitutional:       Appearance: Normal appearance. She is normal weight. HENT:      Head: Normocephalic and atraumatic. Right Ear: Tympanic membrane, ear canal and external ear normal.      Left Ear: Tympanic membrane, ear canal and external ear normal.      Nose: Congestion and rhinorrhea present. Mouth/Throat:      Mouth: Mucous membranes are moist.      Pharynx: Oropharynx is clear. Eyes:      Extraocular Movements: Extraocular movements intact. Conjunctiva/sclera: Conjunctivae normal.      Pupils: Pupils are equal, round, and reactive to light.    Cardiovascular:      Rate and Rhythm: Normal rate and regular rhythm. Pulses: Normal pulses. Heart sounds: Normal heart sounds. Pulmonary:      Effort: Pulmonary effort is normal.      Breath sounds: Normal breath sounds. Musculoskeletal:         General: Normal range of motion. Cervical back: Normal range of motion and neck supple. Skin:     General: Skin is warm and dry. Capillary Refill: Capillary refill takes less than 2 seconds. Neurological:      General: No focal deficit present. Mental Status: She is alert and oriented to person, place, and time. Mental status is at baseline. Psychiatric:         Mood and Affect: Mood normal.         Behavior: Behavior normal.         Thought Content: Thought content normal.         Judgment: Judgment normal.           DATA:  Lab Review:   Results for orders placed or performed during the hospital encounter of 04/23/21   COVID-19 & Influenza Combo   Result Value Ref Range    SARS-CoV-2 RNA, RT PCR NOT DETECTED NOT DETECTED    INFLUENZA A NOT DETECTED NOT DETECTED    INFLUENZA B NOT DETECTED NOT DETECTED     CT FACIAL BONES WO CONTRAST  Status: Final result   Order Providers    Authorizing Encounter Billing   Leanna Klein MD STR CT IMAGING RM1  [facility] Tariq Luque MD          Signed by    Signed Date/Time Phone Pager   Jennifer Freeman 4/02/2021  9:08 [phone]    Reading Providers    Read Date Phone Pager   Jennifer Freeman Fri Apr 2, 2021  9:08 [phone]    All Reviewers List    Payton Devan on 4/5/2021 10:30   Leanna Klein MD on 4/2/2021 15:17   CT FACIAL BONES WO CONTRAST: Result Notes   Enedelia Sonam Mortensen   4/5/2021 10:30 AM EDT       Patient has an appointment today at 10:45.     Joe Broussard MD   4/2/2021  3:11 PM EDT       Please let the patient know that her CT scan demonstrated very distinctly isolated right maxillary sinusitis with a little involvement of the ethmoids.  This can be treated by sinus surgery and is unlikely to respond to antibiotics no matter how many courses she is on.  I am not sure when she is supposed to follow-up with me. Maru Murillo it is far in the future please bring her forward so that I can discuss treatment options with her. Thank you  PFC          Radiation Dose Estimates    No radiation information found for this patient   Narrative   PROCEDURE: CT FACIAL BONES WO CONTRAST       CLINICAL INFORMATION: Chronic pansinusitis, Nasal obstruction, Nasal valve collapse, Obstructive sleep apnea. Persistent sinus congestion after 5 rounds of antibiotics.       COMPARISON: 5/29/2014.       TECHNIQUE: Noncontrast 1 mm axial CT images were obtained through the entire head and paranasal sinuses according to the [employer] protocol. Axial, sagittal and coronal images are displayed on PACS.       All CT scans at this facility use dose modulation, iterative reconstruction, and/or weight-based dosing when appropriate to reduce radiation dose to as low as reasonably achievable.       FINDINGS:           Frontal sinuses: Normally aerated and pneumatized. The frontal ethmoidal recesses are patent.       Ethmoid air cells: Normally aerated and pneumatized. The lamina papyracea are intact. The cribriform plates and fovea ethmoidalis are relatively symmetric.       Sphenoid sinus: Normally aerated and pneumatized. The sphenoethmoidal recesses are patent.       Maxillary sinuses: The right maxillary sinus is nearly completely opacified. The ostiomeatal unit is opacified due to mucoperiosteal thickening and the configuration of the uncinate process.       The left maxillary sinus is normally aerated and pneumatized. The ostiomeatal unit is narrowed, but patent.       Nasal cavity: The nasal septum deviates to the left. There are no nasal masses. The mucosa of the nasal turbinates of tears thin throughout. The left middle turbinate abuts the nasal septum.  The left superior nasal turbinate abuts the nasal septum.       The mastoid air cells and middle ear cavities are normally aerated.       There are left-sided facet degenerative changes the upper cervical spine.               Impression       1. Near complete opacification of the right maxillary sinus. The drainage passageway is opacified. 2. The left middle nasal turbinate and superior nasal turbinates abut the septum. The nasal septum deviates to the left.                   **This report has been created using voice recognition software. It may contain minor errors which are inherent in voice recognition technology. **       Final report electronically signed by Dr. Zora Dumas on 4/2/2021 9:08 AM       Order History    Open Order Details   PACS Images     Show images for CT FACIAL BONES WO CONTRAST   Results History Report    View Report   Associated Diagnoses    Chronic pansinusitis       Nasal obstruction       Nasal valve collapse       Obstructive sleep apnea       External Result Report    External Result Report   Existing Charges  Charge Line Charge Code Status Charge Trigger Charge Type   [id number]  Ct Facial Bones W/o Contrast [[id number]] [address] [facility] Billing Imaging end exam Technical   [id number] CT SCAN,MAXILLOFACIAL AREA,W/O CONTRAST [facility]  Imaging result study Professional   Order Report    Order Details    CT FACIAL BONES WO CONTRAST  Status: Final result   Order Providers    Authorizing Encounter Billing   Lily Matthew MD STR CT IMAGING RM1  [facility] [place] Spotted, DO          Signed by    Signed Date/Time Phone Pager   Gary Terry 7/09/2021  4:52 [phone]    Reading Providers    Read Date Phone Pager   Gary Terry [facility] Jul 9, 2021  4:52 [phone]    All Reviewers List    Lily Matthew MD on 7/10/2021 13:23   Routing History    Priority Sent On From To Message Type    7/9/2021  4:54 PM Eugenie Lloyd Incoming Radiant Results From [address] [place] [address] Geoffrey Valencia MD Results   Radiation Dose Estimates    No radiation information found for this patient   Narrative   PROCEDURE: CT FACIAL BONES WO CONTRAST       CLINICAL INFORMATION: Chronic pansinusitis.       COMPARISON: CT sinuses dated 4/2/2021.       TECHNIQUE: Helical CT of the sinuses utilizing IGS protocol with sagittal and coronal reconstructions.        All CT scans at this facility use dose modulation, iterative reconstruction, and/or weight-based dosing when appropriate to reduce radiation dose to as low as reasonably achievable.       FINDINGS:   Damian Conrad has been interval right uncinectomies/antrostomies, bilateral middle turbinectomies and partial ethmoidectomies. The frontal sinuses are clear. Frontoethmoidal recesses are widely patent and have been widened in the interval. There is mild to    moderate mucosal thickening in the residual ethmoid air cells more pronounced on the left. There is mild mucosal thickening in the right maxillary sinus and trace mucosal thickening in the left maxillary sinus. The sphenoid sinuses and sphenoethmoidal    recesses are clear. Redemonstration of mild leftward deviation of the anterior aspect of the nasal septum.       Orbits are unremarkable. Mastoid air cells and middle ears are clear. Visualized intracranial contents are grossly unremarkable. Temporal mandibular joints appear intact.               Impression    Interval FESS with overall mild recurrent sinus mucosal inflammation more focally pronounced in the residual left anterior ethmoid air cells.                   **This report has been created using voice recognition software. It may contain minor errors which are inherent in voice recognition technology. **       Final report electronically signed by Dr. Chaya Nayak MD on 7/9/2021 4:52 PM       Order History    Open Order Details   PACS Images     Show images for CT FACIAL BONES WO CONTRAST   Results History Report    View Report   Associated Diagnoses    Chronic pansinusitis       External Result Report    External Result Report   Existing Charges  Charge Line Charge Code Status Charge Trigger Charge Type   [id number]  Ct Facial Bones W/o Contrast [[id number]] [address] [facility] Billing Imaging end exam Technical   [id number] CT SCAN,MAXILLOFACIAL AREA,W/O CONTRAST [address] (STR)  Imaging result study Professional   Order Report    Order Details    THE PATIENT REQUIRES DUPIXENT 300 MG TO CONTROL THE FOLLOWING CONDITION. HAS TRIED PREVIOUS TREATMENTS LISTED BELOW AND HAS FAILED THERAPY. ALLERGIC RHINITIS DIAGNOSIS WITH NASAL POLYPOSIS (J33.0.J33.9)     PREVIOUS NASAL SURGERY FOR NASAL POLYPOSIS ON 04/30/2021    THE PATIENT WILL NOT BE ON OTHER BIOLOGIC MEDICATION WHILE BEING TREATED WITH DUPIXENT              Assessment/Plan   Lashae was seen today for nasal polyps. Diagnoses and all orders for this visit:    Non-seasonal allergic rhinitis, unspecified trigger  -     CBC Auto Differential; Future  -     IgE; Future  -     IgG; Future  -     STRONGYLOIDES AB, IGG BY ROSA; Future    Nasal polyps  -     CBC Auto Differential; Future  -     IgE; Future  -     IgG; Future  -     STRONGYLOIDES AB, IGG BY ROSA; Future  -     EPINEPHrine (AUVI-Q) 0.3 MG/0.3ML SOAJ injection; Dispense 2 packs of 2 (total 4 devices). Use as directed, STAT for allergic reaction. Polyp, nasal    Polyp of nasal cavity    Allergic rhinoconjunctivitis    Other orders  -     Fluticasone Propionate (XHANCE) 93 MCG/ACT EXHU; 1 spray by Nasal route 2 times daily        Return in about 10 weeks (around 9/23/2021) for Allergy Testing, Lab review, Dupixent F/U. Spent 45 minutes of face-to-face time with the patient with well more than half of the visit being dedicated to the discussion of the various symptom problems, provided education of medications and disease process, as well as discussion of a therapeutic plan for each.     Office to get medical records from previous provider and/or PCP      (Please note that portions of this note may have been completed with a voice recognition program.  Efforts were made to edit the dictation but occasionally words are mis-transcribed.)        Signed:   RICH Rubin CNP  7/15/2021  3:58 PM

## 2021-07-15 NOTE — TELEPHONE ENCOUNTER
Lashae was in the office to see Reza Courtney for an appointment and asked if Dr Kuldeep Lewis had a chance to review her CT scan from 7/9/21? Please advise.

## 2021-07-15 NOTE — PATIENT INSTRUCTIONS
STOP THE FOLLOWING MEDICATIONS (IF TAKING) ONE WEEK PRIOR TO ALLERGY TESTIN. ANTIHISTAMINES - ZYRTEC (CETIRIZINE), CLARITIN (LORATADINE), XYZAL (LEVOCETIRIZINE), BENADRYL (DIPHENHYDRAMINE), HYDROXYZINE, ALLEGRA (FEXOFENADINE)  2. STOP NASAL SPRAYS/RINSES - NASOCORT, FLONASE, NASONEX, ASTELIN, ANTIVERT (MECLIZINE),BUDESONIDE  3. STOP STOMACH MEDICATIONS - PEPCID  4. STOP SINGULAIR OR MONTELUKAST  5. IF POSSIBLE HOLD INHALERS THE DAY OF TESTING BUT BRING WITH YOU TO USE AFTER THE ALLERGY TESTING  6.  ELAVIL (AMITRIPTYLINE) - MUST DISCUSS WITH KADE DURBIN OR PCP PRIOR TO HOLDING    IF YOU ARE PLACED ON ANY NEW MEDICATIONS BETWEEN NOW AND THE TIME OF YOUR ALLERGY TESTING BY ANY OTHER PROVIDER CALL THE OFFICE TO VERIFY IF THIS WILL INTERFERE WITH ALLERGY TESTING

## 2021-07-17 NOTE — TELEPHONE ENCOUNTER
Please let the patient know that her CT scan looked much better than I expected. Let us hold off on scheduling revision surgery until she has gotten further along in her care from Josefina Walker. Let me see her back in approximately 3 months and we will repeat her CT scan around that time if indicated. She should certainly be continuing her nasal irrigations through this phase of her recovery. If the odors she smells gets worse or the secretions get worse, have her call me and we will reconsider.     Thank you    PFC

## 2021-07-19 NOTE — TELEPHONE ENCOUNTER
Lashae returned my call. I relayed this information and she was happy about that. She did mention that after her debridement in the office at her last visit she felt much better but the odor and pressure has returned off and on. She will watch to see if gets worse and let us know. In the meantime she will continue with the irrigations and follow up in 3 months. Appointment made for 10/19/21.

## 2021-07-19 NOTE — TELEPHONE ENCOUNTER
I left a message for Lashae to return my call. She will also need a 3 month follow up with Dr Katherin Amezcua.

## 2021-07-26 ENCOUNTER — NURSE ONLY (OUTPATIENT)
Dept: ALLERGY | Age: 46
End: 2021-07-26
Payer: COMMERCIAL

## 2021-07-26 ENCOUNTER — TELEPHONE (OUTPATIENT)
Dept: ALLERGY | Age: 46
End: 2021-07-26

## 2021-07-26 VITALS
SYSTOLIC BLOOD PRESSURE: 118 MMHG | DIASTOLIC BLOOD PRESSURE: 84 MMHG | HEART RATE: 68 BPM | RESPIRATION RATE: 12 BRPM | TEMPERATURE: 96.6 F

## 2021-07-26 DIAGNOSIS — J33.9 NASAL POLYPS: Primary | ICD-10-CM

## 2021-07-26 PROCEDURE — 96401 CHEMO ANTI-NEOPL SQ/IM: CPT | Performed by: NURSE PRACTITIONER

## 2021-07-26 RX ORDER — MONTELUKAST SODIUM 10 MG/1
10 TABLET ORAL NIGHTLY
Qty: 90 TABLET | Refills: 1 | Status: SHIPPED | OUTPATIENT
Start: 2021-07-26 | End: 2022-03-21 | Stop reason: SDUPTHER

## 2021-07-26 RX ORDER — MONTELUKAST SODIUM 10 MG/1
10 TABLET ORAL NIGHTLY
COMMUNITY
End: 2021-07-26 | Stop reason: SDUPTHER

## 2021-07-26 NOTE — TELEPHONE ENCOUNTER
Patient in the office today for her Dupixent injection and asked if Jennifer Byrd would send in a Rx for her Singulair. Patient had previously gotten it through her PCP but would like to have Jennifer Byrd manage it now. Pharmacy is [facility] on [address] [facility] [facility] Please advise.

## 2021-07-27 ENCOUNTER — TELEPHONE (OUTPATIENT)
Dept: ENT CLINIC | Age: 46
End: 2021-07-27

## 2021-07-27 NOTE — TELEPHONE ENCOUNTER
Received denial for Xhance from [facility]. Also received a letter made up by [facility] for the appeal. Phuc Bush signed the appeal letter and faxed it back to [facility] to submit. Both faxes scanned into media.

## 2021-08-04 NOTE — TELEPHONE ENCOUNTER
Received denial for appeal for Xhance from [email or web address]. Genesis asked me to call the Regine tiwari, Alberto Muñoz to ask her what we need to do next for the patient. Called Regine Mao. Asked her what we needed to do next for the patient. Alberto Muñoz said patient should automatically be enrolled in their patient assistance co-pay card. Alberto Muñoz stated the first Rx is $25.00 then every one after that will be $50.00. She suggested I call Roger Fitzpatrick and verify patient was automatically enrolled. She gave me a phone number of [phone]. I called [facility], spoke to Nanigans. She looked up patient and stated patient has already received her first one for $25.00. I thanked Nanigans for the information.

## 2021-08-05 ENCOUNTER — TELEPHONE (OUTPATIENT)
Dept: ALLERGY | Age: 46
End: 2021-08-05

## 2021-08-05 NOTE — TELEPHONE ENCOUNTER
Patient called in asking where we were on her [address] approval. Patient is due for her 2nd injection on Monday. I called [employer] to check. Spoke to Carson. She looked up the patient and stated our time frame had elapsed and we would need to start a new PA. She offered to do one over the phone. I did the PA with her, answered all the questions. She stated the Dupixent was approved. PA [id number] good through 08/05/2022. The Rx needs to go through [employer]. I then called [facility] to have them transfer the Rx. Spoke to  Rue toby Quintana. He said it would be transferred.

## 2021-08-05 NOTE — TELEPHONE ENCOUNTER
Spoke to Charo to see if she wanted patient to get another sample of Dupixent or if she wanted patient to wait until we received her Rx. Charo stated to give patient another sample since she is approved now. Called patient and informed her of the approval and scheduled her for Monday to receive another sample. Patient verbalized understanding and thanked me.

## 2021-08-19 ENCOUNTER — TELEPHONE (OUTPATIENT)
Dept: ALLERGY | Age: 46
End: 2021-08-19

## 2021-08-19 NOTE — TELEPHONE ENCOUNTER
Received a fax from [facility] stating they have been trying to reach patient to schedule delivery of her Joselin Raddusty but have been unsuccessful. I called patient to inform her they have been trying to reach her. Patient said yes, she knew they were calling her but she still has some left and doesn't want to get it sooner than she needs it. Patient said she will call them early next week to schedule the delivery.

## 2021-08-31 NOTE — TELEPHONE ENCOUNTER
Received fax from [facility]

## 2021-09-22 ENCOUNTER — TELEPHONE (OUTPATIENT)
Dept: ENT CLINIC | Age: 46
End: 2021-09-22

## 2021-09-22 NOTE — TELEPHONE ENCOUNTER
Patient is asking if she can use her nasal rinse(navage), she has allergy testing Wednesday 9/29      Please advise

## 2021-09-29 ENCOUNTER — PROCEDURE VISIT (OUTPATIENT)
Dept: ALLERGY | Age: 46
End: 2021-09-29
Payer: COMMERCIAL

## 2021-09-29 VITALS
OXYGEN SATURATION: 97 % | HEIGHT: 63 IN | HEART RATE: 91 BPM | TEMPERATURE: 97.6 F | DIASTOLIC BLOOD PRESSURE: 62 MMHG | BODY MASS INDEX: 33.26 KG/M2 | WEIGHT: 187.7 LBS | RESPIRATION RATE: 13 BRPM | SYSTOLIC BLOOD PRESSURE: 122 MMHG

## 2021-09-29 DIAGNOSIS — Z91.048 ALLERGY TO MOLD: ICD-10-CM

## 2021-09-29 DIAGNOSIS — J30.89 ALLERGIC RHINITIS CAUSED BY FEATHERS: ICD-10-CM

## 2021-09-29 DIAGNOSIS — Z91.038 ALLERGY TO COCKROACHES: ICD-10-CM

## 2021-09-29 DIAGNOSIS — J30.81 ALLERGY TO DOG DANDER: ICD-10-CM

## 2021-09-29 DIAGNOSIS — H10.10 ALLERGIC RHINOCONJUNCTIVITIS: Primary | ICD-10-CM

## 2021-09-29 DIAGNOSIS — J30.9 ALLERGIC RHINOCONJUNCTIVITIS: Primary | ICD-10-CM

## 2021-09-29 DIAGNOSIS — J33.0 POLYP OF NASAL CAVITY: ICD-10-CM

## 2021-09-29 DIAGNOSIS — J30.81 ANIMAL DANDER ALLERGY: ICD-10-CM

## 2021-09-29 DIAGNOSIS — Z91.09 MITE ALLERGY: ICD-10-CM

## 2021-09-29 DIAGNOSIS — J30.1 ALLERGY TO TREES: ICD-10-CM

## 2021-09-29 DIAGNOSIS — J30.1 ACUTE SEASONAL ALLERGIC RHINITIS DUE TO POLLEN: ICD-10-CM

## 2021-09-29 DIAGNOSIS — Z91.018 FOOD ALLERGY: ICD-10-CM

## 2021-09-29 DIAGNOSIS — J45.20 MILD INTERMITTENT ASTHMA WITHOUT COMPLICATION: ICD-10-CM

## 2021-09-29 DIAGNOSIS — J01.20 ACUTE NON-RECURRENT ETHMOIDAL SINUSITIS: ICD-10-CM

## 2021-09-29 DIAGNOSIS — J30.81 CAT ALLERGIES: ICD-10-CM

## 2021-09-29 DIAGNOSIS — J33.9 NASAL POLYPS: ICD-10-CM

## 2021-09-29 PROCEDURE — 95004 PERQ TESTS W/ALRGNC XTRCS: CPT | Performed by: NURSE PRACTITIONER

## 2021-09-29 PROCEDURE — 99214 OFFICE O/P EST MOD 30 MIN: CPT | Performed by: NURSE PRACTITIONER

## 2021-09-29 RX ORDER — FLUTICASONE PROPIONATE 93 UG/1
1 SPRAY, METERED NASAL 2 TIMES DAILY
Qty: 6.3 ML | Refills: 11 | Status: SHIPPED | OUTPATIENT
Start: 2021-09-29 | End: 2022-09-28 | Stop reason: SDUPTHER

## 2021-09-29 ASSESSMENT — ENCOUNTER SYMPTOMS
COUGH: 0
RHINORRHEA: 1
CHOKING: 0
FACIAL SWELLING: 0
VOMITING: 0
ABDOMINAL PAIN: 0
EYE DISCHARGE: 1
EYE ITCHING: 1
APNEA: 0
CHEST TIGHTNESS: 0
VOICE CHANGE: 0
COLOR CHANGE: 0
SHORTNESS OF BREATH: 0
STRIDOR: 0
TROUBLE SWALLOWING: 0
NAUSEA: 0
SINUS PAIN: 1
DIARRHEA: 0
SORE THROAT: 0
SINUS PRESSURE: 1
WHEEZING: 0

## 2021-09-29 NOTE — PROGRESS NOTES
@[facility]@    Allergy & Asthma   [address] [facility], [address]  Ph:   [phone]  Fax:[phone]    Provider:  Dr. Jacquelin Wilhelm:   Chief Complaint   Patient presents with    Follow-up     10wk F/U Allergy testing, lab review, Dupixent F/U           HISTORY OF PRESENT ILLNESS: ESTABLISHED PATIENT HERE FOR EVALUATION   51year old female with history of nasal polyposis. Had surgery on April 30 2021 where she had nasal polyps removed by Dr. Lyndsay Langston. Patient states that she recently had an office visit at ENT practice where one was snipped the endoscopy from her nasal cavity. Patient states that she has had allergies since she was a young child. She complains now of chronic nasal stuffiness. She states that it happens all year. It typically becomes worse whenever she is around animals including cats. She states is severe in severity. She states that although she had surgery in April she still has a lot of nasal congestion and feels like she has a lot of nasal sinus pressure. She has taken Flonase for several years but states that it does not seem to be very beneficial although she uses it every day. She has also tried Claritin 10 mg daily from 09/01/2020 to 10/31/2020 which was not effective, zyrtec 10 mg daily 11/01/2020 to present, chlorpheniramine 2 mg for last 12 month every 4 hours as needed, meclizine 25 mg every 4 hours as needed, benadryl every 4 hours as needed, and allegra 180 mg every day from 06/01/2020 to 08/30/2020. Patient has not found any antihistamine to be effective in controlling her nasal congestion. She also reports a reduced sense of smell intermittently. Patient denies any nausea, vomiting, fever. She does complain of frequent sinus infections. She states in the last year she had 5 sinus infections and was treated with antibiotics. She states that her eyes itch, burn anterior. She complains of itchy ears.   At times they become painful. She sneezes a lot and has itchy nose that runs and is stuffy. She is a mouth breather and snores. She has obstructive sleep apnea. She complains of yellow-green drainage from her nose. She states her throat itches and she has a lot of postnasal drip. There are times that she does have chest tightness from the phlegm. She also complains of headaches. She returns today to review labs and for allergy testing. Patient was also ordered that she has done very well on the medication. Review of Systems:  Review of Systems   Constitutional: Negative for activity change, appetite change, chills, diaphoresis, fatigue, fever and unexpected weight change. HENT: Positive for congestion, postnasal drip, rhinorrhea, sinus pressure, sinus pain and sneezing. Negative for dental problem, ear discharge, ear pain, facial swelling, hearing loss, mouth sores, nosebleeds, sore throat, tinnitus, trouble swallowing and voice change. Eyes: Positive for discharge and itching. Negative for visual disturbance. Respiratory: Negative for apnea, cough, choking, chest tightness, shortness of breath, wheezing and stridor. Cardiovascular: Negative for chest pain, palpitations and leg swelling. Gastrointestinal: Negative for abdominal pain, diarrhea, nausea and vomiting. Endocrine: Negative for cold intolerance, heat intolerance, polydipsia and polyuria. Genitourinary: Negative for difficulty urinating, dysuria, enuresis, hematuria and urgency. Musculoskeletal: Negative for arthralgias, gait problem, neck pain and neck stiffness. Skin: Negative for color change and rash. Allergic/Immunologic: Positive for environmental allergies and food allergies. Negative for immunocompromised state. Neurological: Negative for dizziness, syncope, facial asymmetry, speech difficulty, light-headedness and headaches. Hematological: Negative for adenopathy. Does not bruise/bleed easily. PO), Take by mouth, Disp: , Rfl:     Ascorbic Acid (VITAMIN C PO), Take by mouth, Disp: , Rfl:     VITAMIN D PO, Take by mouth, Disp: , Rfl:     Probiotic Product (PROBIOTIC PO), Take by mouth 1 tab in morning, Disp: , Rfl:     b complex vitamins capsule, Take 1 capsule by mouth daily, Disp: , Rfl:     cetirizine (ZYRTEC) 10 MG tablet, Take 10 mg by mouth nightly Not in last 10 days, Disp: , Rfl:     meclizine (ANTIVERT) 25 MG tablet, Take 1 tablet by mouth 4 times daily as needed for Dizziness, Disp: 40 tablet, Rfl: 1    Pseudoephedrine HCl (SUDAFED 12 HOUR PO), Take 1 tablet by mouth daily , Disp: , Rfl:       Physical Exam:      Vitals:    Vitals:    09/29/21 1258   BP: 122/62   Pulse: 91   Resp: 13   Temp: 97.6 °F (36.4 °C)   SpO2: 97%       187 lb 11.2 oz (85.1 kg)       Temp: 97.6 °F (36.4 °C) I @FLOWSTAT(6)@ IPulse: 91 I @FLOWSTAT(8)@ I BP: 122/62 I @STESPI(43)@; @EOKXNU(44)@ I Resp: 13 I @FLOWSTAT(9)@ I SpO2: 97 % I @FLOWSTAT(10)@ I   I Height: 5' 3\" (160 cm) I   I Facility age limit for growth percentiles is 20 years. I     Facility age limit for growth percentiles is 20 years. Facility age limit for growth percentiles is 20 years. Facility age limit for growth percentiles is 20 years. Facility age limit for growth percentiles is 20 years. Physical Exam:    Physical Exam  Vitals and nursing note reviewed. Constitutional:       Appearance: Normal appearance. She is normal weight. HENT:      Head: Normocephalic and atraumatic. Right Ear: Ear canal and external ear normal.      Left Ear: Ear canal and external ear normal.      Nose: Nose normal.      Mouth/Throat:      Mouth: Mucous membranes are moist.      Pharynx: Oropharynx is clear. Eyes:      Extraocular Movements: Extraocular movements intact. Conjunctiva/sclera: Conjunctivae normal.      Pupils: Pupils are equal, round, and reactive to light. Cardiovascular:      Rate and Rhythm: Normal rate and regular rhythm. Pulses: Normal pulses. Heart sounds: Normal heart sounds. Pulmonary:      Effort: Pulmonary effort is normal.      Breath sounds: Normal breath sounds. Musculoskeletal:         General: Normal range of motion. Cervical back: Normal range of motion and neck supple. Skin:     General: Skin is warm and dry. Capillary Refill: Capillary refill takes less than 2 seconds. Neurological:      General: No focal deficit present. Mental Status: She is alert and oriented to person, place, and time. Mental status is at baseline. Psychiatric:         Mood and Affect: Mood normal.         Behavior: Behavior normal.         Thought Content: Thought content normal.         Judgment: Judgment normal.             DATA:  Lab Review:    CBC:   Lab Results   Component Value Date    WBC 5.8 2021    RBC 4.79 2021    HGB 14.4 2021    HCT 42.5 2021    MCV 89 2021    MCH 30.1 2021    MCHC 33.9 2021     2021          No results found for: IGE   No results found for: IGG  No results found for: IGA   No results found for: IGM    No results found for: DEIRDRE   No results found for: RF       No results found for this or any previous visit. No results found for this or any previous visit. All current and previous medial labs have been reviewed and discussed with patient     ACT score 21    Procedures: Allergy Testin2021        Last use of antihistamine (or other medication affecting response to histamine): greater than one week    Patient informed of risks of skin allergy testing mild, moderate, and severe including potential for anaphylaxis. Patient wishes to proceed.   Consent signed: Yes    Location: Back  Testing preformed: Intradermal    Panel A Epictuaneous   Panel A  Epictuaneous    Site Allergen  W (mm) F  Site Allergen  W (mm) F   A1 Histamine positive control 8 31  A6 Cockroach Mix 6 15   A2 Glycerin negative control 0 18  A7 Feather Mix 6 14   A3 Dust mite Mix 19 35  A8 Mouse epithelia 6 16.9   A4 Cat Hair 5 20  A9 Cattle Epithelia 0 3   A5 Dog Ep. 4 7  A10 Horse Epithelia 0 3              Panel B Epictuaneous   Panel B  Epictuaneous    Site Allergen  W (mm) F  Site Allergen  W (mm) F   B11 Gerardo Red/Green Tree 0 3  B16 Hackleberry Tree 0 3   B12 Imperial, Eastern Tree 7.1 19  B17 RYAN, [place] Tree 5.5 13   B13 Birch mix tree 5.8 10  B18 [place], red Tree 0 3   B14 [place] mix, [place] Tree 0 3  B19 Kreset[place], white/eastern Tree 0 3   B15 Elm mix tree 0 3  B20 Marvin, eastern Tree 0 3              Panel C Epictuaneous   Panel C  Epictuaneous    Site Allergen  W (mm) F  Site Allergen  W (mm) F   C21 [place], Black Tree 5.7 15  C26 Maple 4.8 12   C22 [place], Black Tree 0 3  C27 Jairo Grass 0 3   C23 Box Elder 5.8 11  C28 B[place] 7.1 10   C24 [place] Mtn.  0 3  C29 7 grass mix 5 11   C25 Pecan 0 3  C30 Canary Grass 0 3              Panel D Epictuaneous   Panel D  Epictuaneous    Site Allergen  W (mm) F  Site Allergen  W (mm) F   D31 [place] Cult.  0 3  D36 [place] weed 6 10   D32 Cocklebur [place] 6.2 12  D37 Nettle weed 0 3   D33 Kochia/Firebush weed 4.4 11  D38 Pigweed Rough, Redroot weed 0 3   D34 Dock-Sorrel Mix weed 5.1 12  D39 Plantain, English weed 5.4 11   D35 Ragweed mix 10 17  D40 Sagebrush, Patrick weed 0 3              Panel E Epictuaneous   Panel E  Epictuaneous    Site Allergen  W (mm) F  Site Allergen  W (mm) F   E41 Russian Thistle 4.4 7  E46 Aureobasidium pllulans mold 5.6 15   E42 Mugwort Common 5.6 9  E47 Bipolaris/Helminthosporium .  Mold 5.6 9   E43 Sheep [place], Red 5.2 8  E48 Cladosporium herb, mold 4.9 9   E44 Acremoniumstrictum, Mold 0 3  E49 Cladosporium sphaero mold 0 3   E45 Aspergillus fumiga, Mold 0 3  E50 Drechsleraspicifera, mold 0 3              Panel F Epictuaneous   Panel F  Epictuaneous    Site Allergen  W (mm) F  Site Allergen  W (mm) F   F51 Penicillium chrysog Mold 0 3  F56 GS Fish mix, food 0 3   F52 Alternaria Alternata 0 3  F57 GS Shellfish Mix Food 0 3   F53 [place] Food 5.6 9  F58 Milk, Cow Food 0 3   F54 Corn Food 0 3  F59 Peanut Food 0 3   F55 Egg, Whole Chicken food    F60 Sesame Seed Food 0 3               Panel G Epicutaneous    Panel G Epicutaneous    Site Allergen W (mm) F  Site Allergen W (mm) F   G61 Soybean Food 0 3        G62 Wheat, Whole Food 0 3            62 Panel Skin test performed. All results interpreted as 0 mm unless noted above. Controls performed with Histamine (positive) and Glycerin (negative). Allergy skin testing procedure was separate then eating visit and took approximately 35 minutes for procedure and explanation of results. .  This time was not counted in the educational time listed below. Assessment/Orders:    Diagnosis Orders   1. Allergic rhinoconjunctivitis     2. Nasal polyps     3. Polyp of nasal cavity     4. Mite allergy     5. Animal dander allergy     6. Food allergy     7. Allergy to mold     8. Acute seasonal allergic rhinitis due to pollen     9. Allergy to trees     10. Allergic rhinitis caused by feathers     11. Allergy to dog dander     12. Cat allergies     13. Allergy to cockroaches     14. Acute non-recurrent ethmoidal sinusitis     15. Mild intermittent asthma without complication         All diagnostic imaging  have been personally reviewed     Plan:  Follow Up:6 months    Patient  All allergies, given the packet to take home and read, and explained risk and benefits of allergy shots. She is going to contact her insurance company to find out if they will pay for allergy shots and let us know if she wishes to  Patient has Auvi-Q and is aware of how to use  Patient explained allergy avoidance  Patient start back on antihistamines  Patient given refill of XHANCE  Patient to start back on [place] [facility]  [address] [place]ERR[place]  [phone]    Allergy Injection Guide      Dear is  your only resource. If you have questions or situations that arise please feel free to ask. Sincerely,  [facility]  Brenda Fitzgerald DNP    General Allergy Injection Procedure  1. We will confirm your name and date of birth along with asking you to by checking the name on your vials and date of birth at each visit. 2.. We will ask if you had any problems with your last injection after you left the office. Specifically:   Bumps at the site that may have developed or enlarged - especially the day after the injection.  Itching away from the injection site   Hives   Sneezing   Shortness of breath, wheezing or chest tightness   Throat tightness  If any of these symptoms, or anything you may be concerned or have questions about has arisen, now is the time to discuss these concerns, NOT after you have received your injection. (See Allergy Injection Reactions)  3. We will need to be able to get to your upper arm to give your injection, so you may want to consider wearing short sleeves and/or easy clothing to remove on days you plan on getting an injection. 4. Allergy injections are given in the outer aspect of the upper arm. Other areas of the arm will increase the irritation or may affect local nerves. Please do not ask for an injection in any other place. 5. After your injection you are REQUIRED to wait 30 minutes in the waiting room. This is for your safety in the event of serious side effects developing. Remember: A systemic life-threatening reaction may occur at any time, even after years of trouble free injections. 6. Before leaving the office have your arm checked by the nurses or medical staff. 7. If you are a new injection patient we ask that you come in twice a week in order to reach your maximum concentration or  maintenance dose as quickly as possible. Thereafter you will be coming in weekly.  The nurse/medical assistant or provider will inform you when you can reduce to weekly injections based on your schedule and after the nurse/medical assistant speaks with the provider. 10. Ordering antigen. Insurances will require us to make up either a 3 or 6-month supply of antigen. 11. Vacations/Missed Doses. Please get an injection immediately before leaving on vacation and upon return. This minimizes the impact of being off your injections for an extended period of time. 12. Please notify us of any new medications prescribed by other providers. Some medications (specifically beta-blockers meds ending in [address] as in [place], these medications are contraindicated if on allergy injections. If you are on a beta-blocker medication, we will have to discontinue allergy injections and request that you see your provider about a possible change in medications. 13. Do not exercise or play sports 1 hour before or after receiving an allergy injection. 14. Do not get another injection/immunization on the same day (i.e. MMR, DPT, Influenza, Pneumonia, etc.)  15. Women - Please notify us immediately if you become pregnant. We prefer to STOP allergy injections during the time of your pregnancy. 16. Please notify us immediately of any address or insurance change. If we are not notified of insurance changes you may be liable for charges incurred. Do not get an injection if:   Running a fever   Experiencing asthma symptoms of any degree   Feel ill enough to miss school/work   Have a rash or hives. Call if you have any questions about receiving allergy injections when sick. (See Frequently Asked Questions). 17.  Always have your Epi-pen or Auvi-Q with you. For Patients Receiving Allergy Shots Outside Our Office  At times patients request to receive injections at an office other than one of ours. We are happy to work with your PCP so that you may receive injections. In these instances, certain things will become your responsibility to facilitate this process.   1. To receive injections at a MEDICAL office other than ours there must be a provider, provider assistant (PA), or nurse practitioner (NP) on duty at the time of the injection and for the mandatory 30-minute waiting period. 3. Insure the provider (PA or NP) will agree to give the allergy injections. Not all providers (PA or NP) are comfortable giving allergy injections or have the staff available to provide this service. Please do this before asking us to mail the antigen. 4. The antigen will be mailed via certified mail to the provider's office. The provider's office will be asked not to relinquish the antigen to you, but to mail it directly back to our office should it be necessary. 5. While receiving allergy injections outside our office, follow the recommended schedule of increasing dose by 0.05 per injection and do not exceed a maximum of 0.50 ml per dose. 6. DO NOT GIVE  ANTIGEN. Each antigen bottle is labeled with an expiration date, please dispose of  antigen. 7.  If there are any problems in regards to receiving the antigen you must come back to our office to receive any additional injections. 8. REORDERING - Approximately 2 weeks before the expiration date on the antigen bottles notify our office a refill is needed. We MUST receive paperwork back from the outside office for injections given before the refill will be completed. How Do Allergy Injections Work? In order to understand the mechanism of allergy injections we must first review the allergic response. When pollen, dander, or another allergen is introduced into the body of an allergic person, the body may respond by developing allergic  antibodies (IgE). As more and more allergen over time are introduced into the body, more allergic antibodies are made. Allergic antibodies attach to specialized allergy cells called mast cells.  On future allergen exposure, the mast cell is signaled to release histamine, leukotriene and other allergy causing chemicals that attach to various parts of the body  producing symptoms commonly seen with allergies like: sneezing, itchy watery eyes, runny nose, congestion, wheezing, cough, etc. Allergy injections stimulate the body to make blocking antibodies. Blocking antibody, as the name implies, blocks the attachment of allergens to allergic antibodies on mast cells, thereby preventing the release of the allergy causing chemicals (histamine, leukotriene, etc.) The longer the stimulation and production of blocking antibody (i.e. the longer allergy injections continue) the more complete the blocking of the mast cells. Also, while allergy injections  are increasing blocking antibodies, allergic antibodies are decreasing, thereby producing effective results by two mechanisms. What is in [address]? Allergy shots contain those allergens that were positive on skin testing (a positive test is a 3mm) after your provider considers your history and environmental exposures. A formula or recipe is written by your provider and the serum made is specific to your test. There are guidelines for mixing extracts, and a certain amount of experience on the allergist's part to optimize this process. But, let's go a little deeper with this question. Where do we get the pollen, dander, mold spores and dust (and dust mite) that we use to make the allergy extract? The pollens are collected from cultivated fields, green houses, or from nature by vacuum collection or a drying process. The pollens are then filtered extensively to isolate a single pollen and to remove contaminants. The animal dander is collected from the pelt and skin of healthy animals. Mold spores and dust mite allergens are cultivated in controlled conditions in laboratories. The industry is rigorously monitored and each allergen is standardized to minimize variations. This is important to insure a constant potency.  Finally, each allergen batch is tested before being approved for human use.    A Note on Food Allergies  Immunotherapy to food allergies has not been proven safe and effective. Therefore, foods will not be put in your allergy injections. The best treatment for food allergies is avoidance of that food. Allergy Extracts  The allergy extract or antigen is the mix that you receive in your allergy injections. It is not a serum. Our provider individualizes each extract for the patient. It is reflective of your skin test results, history and environmental exposures. Our providers review the results of your skin testing along with your allergy history to generate a formula or recipe for your allergy extract. Each extract is formulated in a maximum concentration of 1:20 (allergen: diluent). From this 1:20 concentration a series of dilutions are made, each 10 times weaker than the previous, until the starting dilution is reached. Your starting dilution is determined by the provider and is dependent on sensitivity, history and the season in which you are initiating allergy injections. Each dilution is color coded for  easy identification. Top Color Concentration  Silver top 1:10,000   Green top 1:1,000  Blue top 1:100  Yellow top 1:10  Red top 1:1 (maintenance)    Most patients will start their injections in the 1:10,000 dilutions, and progress up from there in a stepwise fashion. A minimum of ten shots of a pre-determined amount is given from each dilution, starting with a small amount and progressing to a larger and more potent amount with each injection, as long as there are no reactions noted.     An ideal, reaction-free progression is as follows:  1:10,000 1:1,000  1:100  1:10  1:1  0.05cc  0.05cc  0.05cc  0.05cc  0.05cc  0.10cc  0.10cc  0.10cc  0.10cc  0.10cc  0.15cc  0.15cc  0.15cc  0.15cc  0.15cc  0.20cc  0.20cc  0.20cc  0.20cc  0.20cc  0.25cc  0.25cc  0.25cc  0.25cc  0.25cc  0.30cc  0.30cc  0.30cc  0.30cc  0.30cc  0.35cc  0.35cc  0.35cc  0.35cc  0.35cc  0.40cc  0.40cc  0.40cc  0.40cc  0.40cc  0.45cc  0.45cc  0.45cc  0.45cc  0.45cc  0.50cc  0.50cc  0.50cc  0.50cc  0.50cc        Exceptions to the progression noted above do occur. The injection personnel and/or your provider will decide if it is necessary to reduce, hold or slow your progression at a particular level. This decision is based on reactions, type of reactions, symptomatic response, and your comfort level. Allergy Injection Reactions  Allergy injections, just like any medication or therapy, has the potential for adverse reactions. However, with other medication reactions you may be required to stop that medication, with allergy injections we expect some degree of reaction to occur. Which isn't to say we ignore reactions, just the opposite. We pay very, very close attention to  reactions that occur from allergy injections. We want and need for you to do the same. These reactions occur in two forms: local and systemic. This is your body's way of telling us we are progressing too fast. THIS IS NOT A RACE! Local Reactions  These are reactions that occur at the injection site and consist of itching and bumps. Depending on the size of the bump and degree of itching, your shot may be reduced slightly. After a reduction, you will resume advancing just as you did before the reaction occurred. In most instances you will progress right past this same dose without difficulty. Occasionally, some patients may have difficulty progressing through a dilution, and if so, we will slow the progression and advance you  slower by either repeating a dose or advancing in smaller amounts.  Local reactions of bumps bigger than a dime in size or an uncomfortable amount of itching are required to be reported to the injection personnel before you leave the office. If the reaction gets larger or itchier after leaving the office, please report the final size to the injection personnel before your next  injection. Systemic Reaction  This reaction is life threatening. Almost all systemic reactions occur within in the first 30-minutes of receiving an allergy injection, hence the reason for the mandatory 30-minute wait in our office or any medical facility after receiving an allergy  injection. A systemic reaction may occur at any time during your allergy treatment. Even with being on your maintenance dose for several years, it is still possible (and does occur) for a systemic reaction to occur. A systemic reaction may have any or all of the following:   Sneezing   Runny and/or itchy nose   Nasal congestion   Itchy and/or watery eyes   Itchy ears or palate (roof of the mouth)   Coughing   Shortness of breath or wheezing   Fainting   Dizziness   Itchy throat   Throat tightness   Hives or itchy skin   Flushing of the skin    If these symptoms develop in our office, please tell the injection personnel or nearest employee immediately. If these symptoms develop outside our office, you will require treatment at the nearest emergency facility immediately. USE YOUR EPIPEN OR AUVI-Q IF NECESSARY. A systemic reaction will necessitate a reduction of one full dilution in your allergy injections. Delayed Reactions  Not all reactions occur within the 30-minute waiting period. You may have a delayed reaction (local or systemic) where symptoms can develop up to 24 hours later. These reactions must be reported to us before your next injection. They are as important as reactions that occur in the office. Please make it a habit to recheck your injection site throughout the day and report any bumps that develop.  (See Local and Systemic Reactions above). It's Not a Race! Immunotherapy (allergy injections) is an important medical treatment for what can be a very serious disease. Over 25% of the population is estimated to have allergic rhinitis (incidentally, 35% of these also have asthma). Studies indicate 1.5 million school days are lost each year and close to 3.4 million work days due to allergies. Prescription medications are conservatively estimated at over $5 billion dollars annually, and the annual loss in work productivity is estimated at  many millions of dollars. Immunotherapy will greatly impact these statistics, improving quality of life, decreasing overall medication costs, and improving productivity at work and school. However, we must caution you that this is not a race! Do NOT blindly proceed with the aim of the maximum concentration (1:20) as your goal. Your goal (and ours) is safety first and then symptom relief. All patients will not be able to obtain the maximum concentration level of 1:20; some sensitive patients may have a lower maintenance concentration. The maintenance concentration (the dose at which the body is most comfortable and doesn't produce repeated severe reactions) will be individualized and adjusted as tolerated. Your maintenance concentration may be lower than the maximum concentration of 1:20 if you are a sensitive individual. A majority will obtain the 1:20 concentration after about 3-4 months of twice weekly injections. Some may never reach that level. These are generalities; you will benefit from your allergy injections at any concentration. Our goal is to get you safely to your maintenance concentration to effectively reduce your allergy symptoms. For your safety:    Accurately report all reactions, local and systemic.  Always wait the mandatory 30-minute waiting period after an injection. Are Your Allergy Injections Helping?   Take a mini self-assessment test. To assess whether or not your allergy injections are beneficial, recall your symptoms or the condition that brought you to our office. For some, this may have been frequent respiratory, sinus or ear infections,  uncontrollable asthma or uncontrollable allergy symptoms (sneezing, itchy eyes, nose or palate, runny nose, watery eyes, etc.) Reassess those symptoms now; usually some improvement will have occurred in about 6-12 months. Ask yourself:   Are my allergy symptoms better?  Am I using less medication?  Am I having less frequent sinus, ear or respiratory infections?  Is my asthma under better control?  Do I feel better?  If I miss my allergy injection, do I feel worse? If you've answered yes to any of these questions, you are starting to exhibit improvement from your allergy injections. Assessing for improvement can also be accomplished by reviewing your symptom changes over the years you've been on allergy injections. Compare apples to apples when assessing for improvement. Specifically, compare same seasons (i.e. spring to spring). Compare the spring before starting allergy injections to the subsequent springs after starting allergy injections. Your own symptoms vary from season to season so comparing fall to spring will be misleading. Frequently Asked Questions  1. Do I need to be tested again, and if so how often? Your providers may retest every 3 years. This allows for adjustment of your allergy extract. New allergies can develop and these may need to be added. 2. What do I do when I'm gone on vacations or business travel? Consistency is the one most important aspect of your injections. The more consistent you are with injections the better the response you will receive. When going on vacation or business trips try as much as possible to get an injection just prior to leaving, and as soon as possible upon return. For most short trips (< 21 days) allergy injections will be minimally affected.   3. Should I receive an injection if I'm sick? If you are ill enough not to go to work or school, postpone your shot. If you are running a fever or having asthma symptoms, do not get an injection until these symptoms have completely resolved. You may receive an injection if you are on an antibiotic or prednisone as long as any fever and/or asthma symptoms have resolved completely. 4. Should I tell my other providers that I'm on allergy injections? Yes, even though this is not a medication. There are medications that should not be given with allergy injections, so please always list your allergy injections on medication forms or whenever asked about medications. 5. How long will I be on allergy injections. The average is 3-5 years. Remaining consistent (weekly) with injections will help achieve a quicker response. Those who have few allergies may respond faster than those who have many allergies. Do not stop the injections on your own; if concerns arise please contact our office to discuss them. 6. What to do if my son/daughter is going away to college and they are on allergy injections? Allergy injections are given at most colleges. Frequently, the student health department will give allergy injections. We will send the allergy extract on to them with complete instructions. If the institution has any questions, they are instructed to contact us before giving any allergy injection. We will then see the student for regular checkups during school breaks when they are home. 7. What do I do if I'm moving? If you are moving, you will need to contact an allergist local to the area in which you are moving. We will need their contact information to transfer your care. We will mail allergy extract to your new provider. 8. Can I, or a friend, give myself injections at home? No. This is a treatment with serious implications.  Should you have a reaction you would not be able to give yourself CPR or drive yourself to an emergency room or even call for help. Injections are only given in a facility with a medical provider available. 9. How do I reorder antigen when it is all gone or ? If you receive your injections in our office, we will reorder as necessary. If you are receiving your injections at another office, see the section For Patients Receiving Allergy Shots Outside [address] [place] [address]. 10. What if I am, or want to become, pregnant? Notify us immediately, if you are or think you are pregnant. During the course of your pregnancy we prefer you stop injections. .  11. Can I get a shot if I have a rash? It may depend on the rash and location of the rash. If there is a new rash on the arms we won't be able to give you an injection. If this is a chronic condition and hasn't changed or doesn't involve the arms you can proceed with injections. In general, all new rashes (or hives) will have to be assessed on a case-by-case basis. Please feel free to call our office to discuss. 12. I always get a bump after my shot, is this significant? Yes, ALL bumps are significant. We need to know how soon after your shot they occurred, how big the final size was before they resolved, and in fact did they resolve? This is your body's warning signal. Please do not do yourself a disservice by not reporting these bumps. PLEASE NOTIFY US IMMEDIATELY IF YOU ON THE FOLLOWING:  Beta Blocker Medications  Brand Generic  Betapace     Acebutolol  Betapace AF    Atenolol  Blocadren   Betaxolol  Brevibloc   Bisprolol   Cartrol   Carteolol  Corzide   Esmolol  Inderal    Lebetalol  Inderal LA   Metoprolol  InnoPran XL   Pindolol   Kerlone   Propranolol  Nadolol   Sotalol   Sectral    Timolol   Tenorectic  Tenormin  Timolide  Toprol-XL   Zebeta   Ziac    Opthalmic Preparations  Betaxon  Betimol  Betoptic  Cosopt  Timoptic  Timoptic XE    MAO Inhibitors  Nardil . .......................................................... Hiro Rodriguez Phenelzine sulfate  Parnate

## 2021-09-29 NOTE — PATIENT INSTRUCTIONS
Allergy Avoidance Measures  1. Shower and wash your hair before going to bed during pollen season. 2. Don't dry clothes and bedding outside where pollen and molds will stick to them. 3. Wash your hands after petting an animal.  4. Run a night light continuously in dark closets and basements to help reduce molds. 5. Place stuffed animal in the freezer for a day to kill dust mites. 6. Be aware that during the mid morning and early evening, pollen levels are the highest of the day. 7. Undress outside your bedroom, leaving allergens from other places away from where you sleep. 8. Remove and wash your clothing immediately after visiting friends with pets. 9. Seal your pillows and mattresses in allergy-proof coverings so dust mites can't get to your nose and eyes. 10. Use a dehumidifier to reduce molds, especially in damp, humid places like  basements, maintaining a humidity level between 30%-50%. 11. Remove carpeting in your home if allergic to pets. Hardwood, tile, and linoleum are easier to keep dander free. 12. If you are allergic to bees, wasps, or yellow jackets, avoid bright colored clothing, scented hairspray, deodorant or perfume, and avoid picnics and BBQ'S. 13. Dust mites and dander will accumulate in upholstered furniture. 14. To thoroughly clean, dust with a damp rag or mop rather than dry dusting/sweeping. 15. Use a vacuum  with a HEPA filter and clean/change the filter when needed. 16. Wear a dust mask while vacuuming to avoid inhaling stirred-up-dust.  17. Leave a room that was just dusted or vacuumed for at least 20 minutes to allow airborne dust to settle. 18. If you live with a pet, cover the air ducts to your bedroom if you have forced-air heating/cooling. 19. Thoroughly clean moldy areas with a 1 to 10 part diluted mixture of chlorine bleach   and water. 21. Do not allow smoking in your home.   21. Have a non-allergic person clean the cages of small animals like mice and gerbils to avoid contact with allergens in their urine. 22. Keep windows in your car closed and use air conditioner. 23. Avoid using window fans which draw pollens and molds into the house. 24. Air out and clean vacation homes if closed up all winter and susceptible to mold growth. 25. Inspect and remove major sources of mold growth such as humidifiers, wet carpeting, rotting mohsen, garbage containers, and water damaged wallpaper. 26. When vacuuming, use double-thick disposable vacuum bags or a high effciency HEPA filter sweeper. 32. Plan your vacations during high pollen season, but be sure to choose a place that has low pollen counts. 28. If you suspect certain foods are causing reactions, consult with your doctor before making radical changes to your diet. 29. Read the labels on all the foods you buy to detect hidden allergies like milk, eggs, and nuts. 27. Wear a medical alert type necklace or bracelet if you have serious allergies or asthma. 32. Wash thoroughly with soap and water, within 2 hours of contact, skin, clothing pets and other objects that has come in contact with poison oak, ivy or sumac. 32. When using insecticides have a non-allergic person spray while you are out of the home. 33. Reduce indoor molds resulting from high humidity by cleaning bathrooms, roya and basements regularly. 34. Avoid contact with irritating fumes generated by wood-burning stoves and kerosene    heaters. 28. Check out your child's school for allergy triggers such as animals in the classroom and damp moldy areas. 39. Run your stove fan while cooking to lower humidity and remove fumes and smells. 40. Wash all bedding in 130 degree water every week to kill dust mites. Hot drying is not  enough. (If you set your hot water heater to a lower temperature to prevent children from scalding themselves, wash items at a commercial laundromat that uses high wash temperatures.)  38.  Before taking a lengthy auto trip, have your car's air conditioner unit thoroughly cleaned of mildew and mold. 39. When exercising outdoors, only do so on wind-free days. 40. Install and run a vent fan in the bathroom while showering or bathing. 41. Avoid irritants like tobacco smoke, aerosols, perfumes, and cleaning products  with strong odors. 43. Leave your home while it is being painted and be sure to use latex rather than Oil-based paints. 43. Before relocating try to visit the new location for 2-4 weeks to see if your symptoms   improve. Remember that it may take months or years to develop allergic symptoms       to a new allergen. 44. Replace down, feather, and foam pillow with fiberfill products. 39. Prune trees and brushes regularly to avoid heavy vegetation around the house. 55. Keep your pets out of the bedroom so you are not exposed to animal allergens while you sleep. 52. Talk to your provider about your symptoms, because you don't have to suffer. Your provider can prescribe effective treatments for controlling allergic symptoms when you  can't escape the allergens that surround you. 48. It would also be worthwhile to include having some form of electric static air  running within bedroom round-the-clock to trap allergens and reduce the third of her daytime exposure by a greater degree.

## 2021-10-19 ENCOUNTER — OFFICE VISIT (OUTPATIENT)
Dept: ENT CLINIC | Age: 46
End: 2021-10-19
Payer: COMMERCIAL

## 2021-10-19 VITALS
RESPIRATION RATE: 14 BRPM | HEIGHT: 62 IN | SYSTOLIC BLOOD PRESSURE: 132 MMHG | BODY MASS INDEX: 34.04 KG/M2 | TEMPERATURE: 97.1 F | WEIGHT: 185 LBS | OXYGEN SATURATION: 97 % | DIASTOLIC BLOOD PRESSURE: 80 MMHG | HEART RATE: 79 BPM

## 2021-10-19 DIAGNOSIS — J32.4 CHRONIC PANSINUSITIS: Primary | ICD-10-CM

## 2021-10-19 DIAGNOSIS — J30.9 ALLERGIC RHINITIS, UNSPECIFIED SEASONALITY, UNSPECIFIED TRIGGER: ICD-10-CM

## 2021-10-19 DIAGNOSIS — J33.9 NASAL POLYPOSIS: ICD-10-CM

## 2021-10-19 PROCEDURE — 99213 OFFICE O/P EST LOW 20 MIN: CPT | Performed by: OTOLARYNGOLOGY

## 2021-10-19 NOTE — PROGRESS NOTES
[facility] [place] [facility]  [facility] EAR, NOSE AND THROAT  [facility]  Dept: [phone]  Dept Fax: [phone]  Loc: [phone]    Lashae Sparks is a 51 y.o. female who was referred by No ref. provider found for:  Chief Complaint   Patient presents with    Follow-up     Patient is here for a 3 month follow up after seeing [facility]        HPI:     Lashae Sparks is a 51 y.o. female with a history of chronic rhinosinusitis of virtually all of the patient's paranasal sinus groups. She is status post a single functional endoscopic sinus operation following which she was referred to Hong Balderrama for allergy evaluation in the context of nasal polyposis. The patient has been treated by [facility] for the development of blocking antibodies to her allergies. She continues to rinse 2-3 times per day with her Navage power  and is using 0.9% saline with distilled water. She reports overall feeling much better in terms of sinusitis symptoms but still has mucopurulent discharge every few days from what she describes as her \"teapot\" method of draining her right cheek sinus. History: Allergies   Allergen Reactions    Seasonal      Current Outpatient Medications   Medication Sig Dispense Refill    ZINC PO Take by mouth      Fluticasone Propionate (XHANCE) 93 MCG/ACT EXHU 1 spray by Nasal route 2 times daily 6.3 mL 11    dupilumab (DUPIXENT) 300 MG/2ML SOSY injection Inject 2 mLs into the skin every 14 days 2 Syringe 11    montelukast (SINGULAIR) 10 MG tablet Take 1 tablet by mouth nightly 90 tablet 1    chlorpheniramine (CHLOR-TRIMETON) 2 MG/5ML syrup Take 2 mg by mouth every 4 hours as needed for Allergies      EPINEPHrine (AUVI-Q) 0.3 MG/0.3ML SOAJ injection Dispense 2 packs of 2 (total 4 devices). Use as directed, STAT for allergic reaction.  2 each 0    Multiple Vitamin (MULTI VITAMIN DAILY PO) Take by mouth      Ascorbic Acid (VITAMIN C PO) Take by mouth      VITAMIN D PO Take by mouth      Probiotic Product (PROBIOTIC PO) Take by mouth 1 tab in morning      b complex vitamins capsule Take 1 capsule by mouth daily      cetirizine (ZYRTEC) 10 MG tablet Take 10 mg by mouth nightly Not in last 10 days      meclizine (ANTIVERT) 25 MG tablet Take 1 tablet by mouth 4 times daily as needed for Dizziness 40 tablet 1    Pseudoephedrine HCl (SUDAFED 12 HOUR PO) Take 1 tablet by mouth daily        No current facility-administered medications for this visit. Past Medical History:   Diagnosis Date    Asthma     Panic attacks 4/98    PONV (postoperative nausea and vomiting)     Seasonal allergies 4/99    Sinusitis, chronic       Past Surgical History:   Procedure Laterality Date    CYST REMOVAL      INTRAUTERINE DEVICE INSERTION      murena    SINUS ENDOSCOPY  04/2021    dr Amanda Bustos Bilateral 4/30/2021    SINUS ENDOSCOPY FUNCTIONAL SURGERY IMAGE GUIDED, RIGHT MAXILLARY ANTROSTOMY WITH REMOVAL OF TISSUE FROM MAXILLARY SINUS, RIGHT PARTIAL ANTERIOR ETHMOIDECTOMY, NASAL ENDOSCOPY OF LEFT ABBEY BULLOSA performed by Becky Ram MD at [address] [place] [address]  07/08/2014    Dr Gallo Hartselle     Family History   Problem Relation Age of Onset    Cancer Mother 46        breast   cancer     Social History     Tobacco Use    Smoking status: Never Smoker    Smokeless tobacco: Never Used   Substance Use Topics    Alcohol use: Not Currently     Comment: Rarely         Subjective:      Review of Systems  Rest of review of systems are negative, except as noted in HPI. Objective:     /80 (Site: Left Upper Arm, Position: Sitting)   Pulse 79   Temp 97.1 °F (36.2 °C) (Infrared)   Resp 14   Ht 5' 2\" (1.575 m)   Wt 185 lb (83.9 kg)   SpO2 97%   BMI 33.84 kg/m²     Physical Exam     Vitals reviewed. No results found.    Lab Results   Component Value Date     03/05/2021    K 4.2 03/05/2021    CL 104 03/05/2021    CO2 22 03/05/2021    BUN 8 03/05/2021    CREATININE 0.74 03/05/2021    CALCIUM 9.2 03/05/2021    LABALBU 4.2 03/05/2021    BILITOT 0.5 03/05/2021    ALKPHOS 78 03/05/2021    AST 22 03/05/2021    ALT 23 03/05/2021       All of the past medical history, past surgical history, family history,social history, allergies and current medications were reviewed with the patient. Assessment & Plan   Diagnoses and all orders for this visit:     Diagnosis Orders   1. Chronic pansinusitis  CT FACIAL BONES WO CONTRAST   2. Allergic rhinitis, unspecified seasonality, unspecified trigger  CT FACIAL BONES WO CONTRAST   3. Nasal polyposis  CT FACIAL BONES WO CONTRAST       Based on the patient's history and radiographic findings, which she experiences as this \"teapot\" phenomenon is in fact what it is medically described as that very thing because she tilts towards her left side in order to get her right enlarged maxillary sinus to drain. She also had findings consistent with some residual chronic sinusitis of the high posterior ethmoids and may need a nasal antral window. I will schedule a follow-up CT scan in the morning of her 3-month visit to look at the results and determine if she will need the second stage posterior ethmoid work and the right and a window work. If she does we will schedule it on that visit. I explained all this in detail to the patient to her satisfaction. She reported being very pleased with the outcome of her care thus far and recognizing what a dramatic difference this is had on her overall health. She also can see her future with a healthier upper aerodigestive tract has been definitively better compared to had she not come to get this care in the first place. Return in about 3 months (around 1/19/2022). **This report has been created using voice recognition software. It may contain minor errors which are inherent in voice recognition technology. **

## 2021-11-03 ENCOUNTER — TELEPHONE (OUTPATIENT)
Dept: ENT CLINIC | Age: 46
End: 2021-11-03

## 2021-11-03 RX ORDER — AMOXICILLIN AND CLAVULANATE POTASSIUM 875; 125 MG/1; MG/1
1 TABLET, FILM COATED ORAL 2 TIMES DAILY
Qty: 20 TABLET | Refills: 0 | Status: SHIPPED | OUTPATIENT
Start: 2021-11-03 | End: 2021-11-13

## 2021-11-03 NOTE — TELEPHONE ENCOUNTER
Patient called in today stating she believes she has a sinus infection. Her symptoms began about a month ago when she stopped her allergy medication in order to have allergy skin testing herer with Ver[facility]. After she had the testing completed she started taking her allergy medications again. She said she had been having yellow/green drainage, sinus pressure and pain through her nose, eyes and cheeks. Patient states she has been taking her allergy medication and doing the nasal irrigations several times a day hoping that would help her symptoms but it hasn't helped. She said she is using 9 grams of salt in her rinses now. She isn't sure if that is helping or hurting her nose. Patient uses [facility] for her [facility]. Please advise.

## 2021-11-04 ENCOUNTER — TELEPHONE (OUTPATIENT)
Dept: FAMILY MEDICINE CLINIC | Age: 46
End: 2021-11-04

## 2021-11-04 DIAGNOSIS — Z20.822 SUSPECTED COVID-19 VIRUS INFECTION: Primary | ICD-10-CM

## 2021-11-04 NOTE — TELEPHONE ENCOUNTER
Pt called stating that she was around her step-daughter all weekend. Step Daughter tested positive Covid today. Pt is having headache, post-nasal drip, and sinus pressure. She is asking for a Covid test to be ordered.     [facility] may be reached at [phone]

## 2021-11-24 ENCOUNTER — TELEPHONE (OUTPATIENT)
Dept: ENT CLINIC | Age: 46
End: 2021-11-24

## 2021-11-24 RX ORDER — LEVOFLOXACIN 750 MG/1
750 TABLET ORAL DAILY
Qty: 7 TABLET | Refills: 0 | Status: SHIPPED | OUTPATIENT
Start: 2021-11-24 | End: 2021-12-01

## 2021-11-24 NOTE — TELEPHONE ENCOUNTER
Rx sent for Levaquin. Could also use Sudafed 30mg tablets (generic is fine) for the sinus pressure every 6 hours as needed.

## 2021-11-24 NOTE — TELEPHONE ENCOUNTER
Patient called in stating she is still having symptoms of a sinus infection. Patient had called in at the beginning of the month (see phone encounter dated 11/03/21) with the same symptoms. Rx for Augmentin sent in at that time for her. Patient states the sinus pressure got somewhat better but never completely went away. Patient states she continues to have yellow/green drainage. She is taking all her prescribed medications and doing the nasal irrigations on a daily basis. Is there possibly another antibiotic patient can try to get this cleared up? Pharmacy is correct. Please advise.

## 2021-11-24 NOTE — TELEPHONE ENCOUNTER
Patient notified. Patient did state she already takes a 12 hour Sudafed daily. Advised her not to add the additional 30 mg of Sudafed. Patient said she will just get the antibiotic and take that. Patient verbalized understanding and thanked me.

## 2021-12-22 ENCOUNTER — HOSPITAL ENCOUNTER (OUTPATIENT)
Dept: WOMENS IMAGING | Age: 46
Discharge: HOME | End: 2021-12-22
Payer: COMMERCIAL

## 2021-12-22 DIAGNOSIS — Z12.31 VISIT FOR SCREENING MAMMOGRAM: ICD-10-CM

## 2021-12-22 PROCEDURE — 77063 BREAST TOMOSYNTHESIS BI: CPT

## 2022-01-03 DIAGNOSIS — R42 VERTIGO: ICD-10-CM

## 2022-01-03 NOTE — TELEPHONE ENCOUNTER
Date of last visit:  2/26/2021  Date of next visit:  Visit date not found    Requested Prescriptions     Pending Prescriptions Disp Refills    meclizine (ANTIVERT) 25 MG tablet 40 tablet 1     Sig: Take 1 tablet by mouth 4 times daily as needed for Dizziness

## 2022-01-04 RX ORDER — MECLIZINE HYDROCHLORIDE 25 MG/1
25 TABLET ORAL 4 TIMES DAILY PRN
Qty: 40 TABLET | Refills: 1 | Status: SHIPPED | OUTPATIENT
Start: 2022-01-04 | End: 2022-10-21 | Stop reason: SDUPTHER

## 2022-01-10 ENCOUNTER — TELEPHONE (OUTPATIENT)
Dept: FAMILY MEDICINE CLINIC | Age: 47
End: 2022-01-10

## 2022-01-10 NOTE — TELEPHONE ENCOUNTER
Pt called stating she tested positive for covid. She ask if you feel she should get the mono [place] antibodies due to her HX of asthma? Pt is having sinus congestion and drainage that always move to pt's chest per pt. Lashae stated she can feel slight chest congestion starting.     Please call pt once addressed

## 2022-01-11 NOTE — TELEPHONE ENCOUNTER
Spoke with patient, she stated she is feeling better this morning. She would like to hold off for now, but if she changes her mind she will call the office.

## 2022-01-21 ENCOUNTER — TELEPHONE (OUTPATIENT)
Dept: ALLERGY | Age: 47
End: 2022-01-21

## 2022-01-21 NOTE — TELEPHONE ENCOUNTER
Received fax from [facility] stating they are trying to contact patient regarding [address] delivery. Called patient. She states she scheduled delivery yesterday for her [address].

## 2022-01-24 ENCOUNTER — HOSPITAL ENCOUNTER (OUTPATIENT)
Dept: CT IMAGING | Age: 47
Discharge: HOME | End: 2022-01-24
Payer: COMMERCIAL

## 2022-01-24 ENCOUNTER — OFFICE VISIT (OUTPATIENT)
Dept: ENT CLINIC | Age: 47
End: 2022-01-24
Payer: COMMERCIAL

## 2022-01-24 VITALS
BODY MASS INDEX: 34.61 KG/M2 | TEMPERATURE: 97.9 F | DIASTOLIC BLOOD PRESSURE: 80 MMHG | WEIGHT: 189.2 LBS | OXYGEN SATURATION: 97 % | RESPIRATION RATE: 16 BRPM | HEART RATE: 88 BPM | SYSTOLIC BLOOD PRESSURE: 118 MMHG

## 2022-01-24 DIAGNOSIS — J32.4 CHRONIC PANSINUSITIS: ICD-10-CM

## 2022-01-24 DIAGNOSIS — J34.89 SINUS PAIN: ICD-10-CM

## 2022-01-24 DIAGNOSIS — J32.2 CHRONIC ETHMOIDITIS: Primary | ICD-10-CM

## 2022-01-24 DIAGNOSIS — J33.9 NASAL POLYPOSIS: ICD-10-CM

## 2022-01-24 DIAGNOSIS — J30.9 ALLERGIC RHINITIS, UNSPECIFIED SEASONALITY, UNSPECIFIED TRIGGER: ICD-10-CM

## 2022-01-24 DIAGNOSIS — J32.0 CHRONIC MAXILLARY SINUSITIS: ICD-10-CM

## 2022-01-24 PROCEDURE — 99214 OFFICE O/P EST MOD 30 MIN: CPT | Performed by: NURSE PRACTITIONER

## 2022-01-24 PROCEDURE — 70486 CT MAXILLOFACIAL W/O DYE: CPT

## 2022-01-24 NOTE — PROGRESS NOTES
[facility] [place] [facility]  [facility]  [facility]  Dept: [phone]  Dept Fax: [phone]  Loc: [phone]    Lashae Flores is a 51 y.o. female who was referred by No ref. provider found for:  Chief Complaint   Patient presents with    Follow-up     patient is here for a three month f/u     HPI:     Lashae Flores is a 51 y.o. female here for follow up regarding her sinuses. She had repeat CT sinus completed this morning. Patient reports that she has been miserable since November. She describes pressure and pain deep mid-face. Her tissues overlying this area are sometimes very tender as well. She has had thick yellow-green drainage. Her symptoms may be slightly worse for the right side, but difficult to tell. Her symptoms are very bothersome to her. She is taking 1-3 doses of 400mg ibuprofen daily for the discomfort. She continues to describe teapot effect for the right maxillary sinus where residual fluid from her rinses will suddenly drain throughout the day depending on her head position. No visual changes. History: Allergies   Allergen Reactions    Seasonal      Current Outpatient Medications   Medication Sig Dispense Refill    meclizine (ANTIVERT) 25 MG tablet Take 1 tablet by mouth 4 times daily as needed for Dizziness 40 tablet 1    ZINC PO Take by mouth      Fluticasone Propionate (XHANCE) 93 MCG/ACT EXHU 1 spray by Nasal route 2 times daily 6.3 mL 11    dupilumab (DUPIXENT) 300 MG/2ML SOSY injection Inject 2 mLs into the skin every 14 days 2 Syringe 11    montelukast (SINGULAIR) 10 MG tablet Take 1 tablet by mouth nightly 90 tablet 1    chlorpheniramine (CHLOR-TRIMETON) 2 MG/5ML syrup Take 2 mg by mouth every 4 hours as needed for Allergies      EPINEPHrine (AUVI-Q) 0.3 MG/0.3ML SOAJ injection Dispense 2 packs of 2 (total 4 devices). Use as directed, STAT for allergic reaction.  2 each 0  Multiple Vitamin (MULTI VITAMIN DAILY PO) Take by mouth      Ascorbic Acid (VITAMIN C PO) Take by mouth      VITAMIN D PO Take by mouth      Probiotic Product (PROBIOTIC PO) Take by mouth 1 tab in morning      b complex vitamins capsule Take 1 capsule by mouth daily      cetirizine (ZYRTEC) 10 MG tablet Take 10 mg by mouth nightly Not in last 10 days      Pseudoephedrine HCl (SUDAFED 12 HOUR PO) Take 1 tablet by mouth daily        No current facility-administered medications for this visit. Past Medical History:   Diagnosis Date    Asthma     Panic attacks 4/98    PONV (postoperative nausea and vomiting)     Seasonal allergies 4/99    Sinusitis, chronic       Past Surgical History:   Procedure Laterality Date    CYST REMOVAL      INTRAUTERINE DEVICE INSERTION      murena    SINUS ENDOSCOPY  04/2021    dr Dank White Bilateral 4/30/2021    SINUS ENDOSCOPY FUNCTIONAL SURGERY IMAGE GUIDED, RIGHT MAXILLARY ANTROSTOMY WITH REMOVAL OF TISSUE FROM MAXILLARY SINUS, RIGHT PARTIAL ANTERIOR ETHMOIDECTOMY, NASAL ENDOSCOPY OF LEFT ABBEY BULLOSA performed by Malu Clarke MD at [address] [place] [address]  07/08/2014    Dr Siva Zepeda     Family History   Problem Relation Age of Onset    Breast Cancer Mother 52    Breast Cancer Maternal Grandmother 48     Social History     Tobacco Use    Smoking status: Never Smoker    Smokeless tobacco: Never Used   Substance Use Topics    Alcohol use: Not Currently     Comment: Rarely         Subjective:      Review of Systems  Rest of review of systems are negative, except as noted in HPI. Objective:     /80 (Site: Left Upper Arm, Position: Sitting)   Pulse 88   Temp 97.9 °F (36.6 °C) (Infrared)   Resp 16   Wt 189 lb 3.2 oz (85.8 kg)   SpO2 97%   BMI 34.61 kg/m²     PHYSICAL EXAM  Constitutional: Oriented to person, place, and time. Appears stated aged. Appears well-developed and well-nourished.  Voice and speech pattern appropriate for age and gender. No distress. No stridor or audible wheezing. HENT:   Head: Normocephalic and atraumatic. Right Ear:  External ear normal.   Left Ear:  External ear normal.     Nose:  External nose normal. Nasal mucosa normal. No lesions noted. Mouth/Throat:  Good dentition. Oral cavity mucosa normal, no masses or lesions noted. Eyes:  Pupils are equal, round, and reactive to light. Conjunctivae and EOM are normal.   Neck:  Normal range of motion. Neck supple. No JVD present. No tracheal deviation present. No thyromegaly present. No cervical lymphadenopathy noted. Cardiovascular:  Normal rate. Pulmonary/Chest:  Effort normal. No stridor or stertor. No respiratory distress. Musculoskeletal:  Normal range of motion. No edema or lymphadenopathy. Neurological:  Alert and oriented to person, place, and time. Cranial nerve II-XII grossly intact. Skin:  Skin is warm. No erythema. Psychiatric:  Normal mood and affect. Behavior is normal.     Vitals reviewed. Data:  All of the past medical history, past surgical history, family history,social history, allergies and current medications were reviewed with the patient. CT Facial Bones 1/24/2022  Narrative   PROCEDURE: CT FACIAL BONES WO CONTRAST       CLINICAL INFORMATION: Chronic pansinusitis, Allergic rhinitis, unspecified seasonality, unspecified trigger, Nasal polyposis. Sinus surgery last April. Recurrent right maxillary pain and pressure.       COMPARISON: 7/9/2021 and 4/2/2021.       TECHNIQUE: Noncontrast 1 mm axial CT images were obtained through the entire head and paranasal sinuses according to the [employer] protocol.  Axial, sagittal and coronal images are displayed on PACS.       All CT scans at this facility use dose modulation, iterative reconstruction, and/or weight-based dosing when appropriate to reduce radiation dose to as low as reasonably achievable.       FINDINGS:   Frontal sinuses: Normally aerated and pneumatized. The frontal ethmoidal recesses are patent. These have been surgically widened.       Ethmoid air cells: There have been partial ethmoidectomies. There is a single opacified ethmoid air cell on the left. The lamina papyracea are intact. The cribriform plates and fovea ethmoidalis are relatively symmetric.       Sphenoid sinus: Normally aerated and pneumatized. The sphenoethmoidal recesses are patent.       Maxillary sinuses: On the right, there has been surgical removal of the medial wall. The drainage passageway is widely patent. There is mild circumferential mucosal thickening which is most pronounced on the along the floor. This has worsened compared to   [place]. There is no air-fluid level.       On the left, there is normal aeration. The ostiomeatal unit is narrowed and not well seen.       Nasal cavity: The nasal septum deviates slightly to the left. There is a defect in the upper portion of the nasal septum posteriorly this is unchanged compared to July. There has been resection of the middle turbinates and superior nasal turbinates. There are no nasal masses. No polyps are identified.       The mastoid air cells and middle ear cavities are normally aerated.       There are stable left-sided facet degenerative changes the upper cervical spine.       Impression       1. Residual mucosal thickening in the right maxillary sinus. The drainage passageway is patent. 2. No air-fluid levels. 3. Postsurgical changes as described.       **This report has been created using voice recognition software. It may contain minor errors which are inherent in voice recognition technology. **       Final report electronically signed by Dr. Yamilet Hook on 1/24/2022 9:06 AM     Single opacified ethmoid air cell right side; ? Ossified versus fungal infection. Single ethmoid air cell left side opacified.   Bowing of lateral ethmoid walls, bilateral and symmetrical            Prior CT 7/9/2021:      Prior CT 4/2/2021:       Assessment & Plan   Diagnoses and all orders for this visit:     Diagnosis Orders   1. Chronic ethmoiditis  Miscellaneous Surgery    Ethmoidectomy   2. Chronic maxillary sinusitis  Miscellaneous Surgery    Ethmoidectomy   3. Sinus pain         The findings were explained and her questions were answered. CT findings discussed in detail with patient. There is a single ethmoid air cell on each side that is opacified and directly correlates to the area she is having significant pressure and discomfort. On the right side, the opacified cell is hyperdense suggesting a possible ossification or fungal infection. Given the persistent discomfort and drainage, infection may be more likely. The right ethmoid cell opacification was less dense on last scan and not present on scan prior to initial surgery. Recommend ethmoidectomy limited to opening up theses cells using IGS. Plan to obtain cultures intra-op and place patient on Levaquin post-op until culture results. In addition, recommend right nasal antral window to reduce or eliminate the teapot effect she experiences throughout the day. Indications, risks and benefits were discussed with patient in detail by Dr Lexus brody. Patient verbalizes understanding and wishes to proceed. Management of patient's care was collaborated with Dr Lexus brody. Return for scheduled surgery. **This report has been created using voice recognition software. It may contain minor errors which are inherent in voice recognition technology. **

## 2022-01-28 NOTE — PROGRESS NOTES
Follow all instructions given by your physician    NPO after midnight   Sips of water am of surgery with allowed medications  Bring insurance info and 's license  Wear comfortable clean clothing  No jewelry or contact lenses to be worn day of surgery  No glue on dentures morning of surgery;you will be asked to remove them for surgery. Case for glasses. Shower night before and morning of surgery with a liquid antibacterial soap, dry with fresh clean towel; no lotions, creams or powder. Clean sheets and pillow case on bed night before surgery  Bring medications in original bottles  Bring CPAP/BIPAP machine if you have one ( you may be charged if one is needed in recovery room )   needed at discharge and someone over 18 to stay with you for 24 hours overnight (surgery may be cancelled if you don't have this)  Report to [facility] on 2nd floor  If you would become ill prior to surgery, please call the surgeon  May have a visitor with you, we request that you limit to 2 visitors in pre-op area  Please bring and wear mask  Call [phone] for any questions    Covid questionnaire Complete; Patient negative for symptoms or exposure. See documentation.

## 2022-02-04 ENCOUNTER — ANESTHESIA (OUTPATIENT)
Dept: OPERATING ROOM | Age: 47
End: 2022-02-04
Payer: COMMERCIAL

## 2022-02-04 ENCOUNTER — ANESTHESIA EVENT (OUTPATIENT)
Dept: OPERATING ROOM | Age: 47
End: 2022-02-04
Payer: COMMERCIAL

## 2022-02-04 ENCOUNTER — HOSPITAL ENCOUNTER (OUTPATIENT)
Age: 47
Setting detail: OUTPATIENT SURGERY
Discharge: HOME | End: 2022-02-04
Attending: OTOLARYNGOLOGY | Admitting: OTOLARYNGOLOGY
Payer: COMMERCIAL

## 2022-02-04 VITALS — TEMPERATURE: 96.8 F | SYSTOLIC BLOOD PRESSURE: 107 MMHG | DIASTOLIC BLOOD PRESSURE: 68 MMHG | OXYGEN SATURATION: 98 %

## 2022-02-04 VITALS
RESPIRATION RATE: 16 BRPM | HEART RATE: 78 BPM | SYSTOLIC BLOOD PRESSURE: 114 MMHG | TEMPERATURE: 96.2 F | HEIGHT: 63 IN | BODY MASS INDEX: 32.92 KG/M2 | OXYGEN SATURATION: 97 % | WEIGHT: 185.8 LBS | DIASTOLIC BLOOD PRESSURE: 73 MMHG

## 2022-02-04 DIAGNOSIS — G89.18 ACUTE POST-OPERATIVE PAIN: Primary | ICD-10-CM

## 2022-02-04 LAB — GRAM STAIN RESULT: NORMAL

## 2022-02-04 PROCEDURE — 87102 FUNGUS ISOLATION CULTURE: CPT

## 2022-02-04 PROCEDURE — 87205 SMEAR GRAM STAIN: CPT

## 2022-02-04 PROCEDURE — 7100000011 HC PHASE II RECOVERY - ADDTL 15 MIN: Performed by: OTOLARYNGOLOGY

## 2022-02-04 PROCEDURE — 87077 CULTURE AEROBIC IDENTIFY: CPT

## 2022-02-04 PROCEDURE — 87147 CULTURE TYPE IMMUNOLOGIC: CPT

## 2022-02-04 PROCEDURE — 6370000000 HC RX 637 (ALT 250 FOR IP)

## 2022-02-04 PROCEDURE — 2720000010 HC SURG SUPPLY STERILE: Performed by: OTOLARYNGOLOGY

## 2022-02-04 PROCEDURE — 2709999900 HC NON-CHARGEABLE SUPPLY: Performed by: OTOLARYNGOLOGY

## 2022-02-04 PROCEDURE — 87186 SC STD MICRODIL/AGAR DIL: CPT

## 2022-02-04 PROCEDURE — 2580000003 HC RX 258: Performed by: OTOLARYNGOLOGY

## 2022-02-04 PROCEDURE — 88305 TISSUE EXAM BY PATHOLOGIST: CPT

## 2022-02-04 PROCEDURE — 7100000000 HC PACU RECOVERY - FIRST 15 MIN: Performed by: OTOLARYNGOLOGY

## 2022-02-04 PROCEDURE — 87070 CULTURE OTHR SPECIMN AEROBIC: CPT

## 2022-02-04 PROCEDURE — 3700000000 HC ANESTHESIA ATTENDED CARE: Performed by: OTOLARYNGOLOGY

## 2022-02-04 PROCEDURE — 7100000010 HC PHASE II RECOVERY - FIRST 15 MIN: Performed by: OTOLARYNGOLOGY

## 2022-02-04 PROCEDURE — 6360000002 HC RX W HCPCS: Performed by: NURSE ANESTHETIST, CERTIFIED REGISTERED

## 2022-02-04 PROCEDURE — 2500000003 HC RX 250 WO HCPCS: Performed by: NURSE ANESTHETIST, CERTIFIED REGISTERED

## 2022-02-04 PROCEDURE — 31255 NSL/SINS NDSC W/TOT ETHMDCT: CPT | Performed by: OTOLARYNGOLOGY

## 2022-02-04 PROCEDURE — 3600000004 HC SURGERY LEVEL 4 BASE: Performed by: OTOLARYNGOLOGY

## 2022-02-04 PROCEDURE — 3700000001 HC ADD 15 MINUTES (ANESTHESIA): Performed by: OTOLARYNGOLOGY

## 2022-02-04 PROCEDURE — APPNB45 APP NON BILLABLE 31-45 MINUTES: Performed by: NURSE PRACTITIONER

## 2022-02-04 PROCEDURE — 3600000014 HC SURGERY LEVEL 4 ADDTL 15MIN: Performed by: OTOLARYNGOLOGY

## 2022-02-04 PROCEDURE — 7100000001 HC PACU RECOVERY - ADDTL 15 MIN: Performed by: OTOLARYNGOLOGY

## 2022-02-04 PROCEDURE — 31020 EXPLORATION MAXILLARY SINUS: CPT | Performed by: OTOLARYNGOLOGY

## 2022-02-04 PROCEDURE — 6360000002 HC RX W HCPCS: Performed by: ANESTHESIOLOGY

## 2022-02-04 RX ORDER — HYDROCODONE BITARTRATE AND ACETAMINOPHEN 5; 325 MG/1; MG/1
1 TABLET ORAL ONCE
Status: COMPLETED | OUTPATIENT
Start: 2022-02-04 | End: 2022-02-04

## 2022-02-04 RX ORDER — SODIUM CHLORIDE 9 MG/ML
INJECTION, SOLUTION INTRAVENOUS CONTINUOUS
Status: DISCONTINUED | OUTPATIENT
Start: 2022-02-04 | End: 2022-02-04 | Stop reason: HOSPADM

## 2022-02-04 RX ORDER — DIPHENHYDRAMINE HYDROCHLORIDE 50 MG/ML
12.5 INJECTION INTRAMUSCULAR; INTRAVENOUS
Status: DISCONTINUED | OUTPATIENT
Start: 2022-02-04 | End: 2022-02-04 | Stop reason: HOSPADM

## 2022-02-04 RX ORDER — LEVOFLOXACIN 750 MG/1
750 TABLET ORAL DAILY
Qty: 7 TABLET | Refills: 0 | Status: SHIPPED | OUTPATIENT
Start: 2022-02-04 | End: 2022-02-11

## 2022-02-04 RX ORDER — LABETALOL 20 MG/4 ML (5 MG/ML) INTRAVENOUS SYRINGE
5 EVERY 10 MIN PRN
Status: DISCONTINUED | OUTPATIENT
Start: 2022-02-04 | End: 2022-02-04 | Stop reason: HOSPADM

## 2022-02-04 RX ORDER — FENTANYL CITRATE 50 UG/ML
INJECTION, SOLUTION INTRAMUSCULAR; INTRAVENOUS PRN
Status: DISCONTINUED | OUTPATIENT
Start: 2022-02-04 | End: 2022-02-04 | Stop reason: SDUPTHER

## 2022-02-04 RX ORDER — MIDAZOLAM HYDROCHLORIDE 1 MG/ML
INJECTION INTRAMUSCULAR; INTRAVENOUS PRN
Status: DISCONTINUED | OUTPATIENT
Start: 2022-02-04 | End: 2022-02-04 | Stop reason: SDUPTHER

## 2022-02-04 RX ORDER — SCOLOPAMINE TRANSDERMAL SYSTEM 1 MG/1
PATCH, EXTENDED RELEASE TRANSDERMAL
Status: DISCONTINUED
Start: 2022-02-04 | End: 2022-02-04 | Stop reason: HOSPADM

## 2022-02-04 RX ORDER — FENTANYL CITRATE 50 UG/ML
25 INJECTION, SOLUTION INTRAMUSCULAR; INTRAVENOUS EVERY 5 MIN PRN
Status: DISCONTINUED | OUTPATIENT
Start: 2022-02-04 | End: 2022-02-04 | Stop reason: HOSPADM

## 2022-02-04 RX ORDER — HYDROCODONE BITARTRATE AND ACETAMINOPHEN 5; 325 MG/1; MG/1
1 TABLET ORAL EVERY 4 HOURS PRN
Qty: 18 TABLET | Refills: 0 | Status: SHIPPED | OUTPATIENT
Start: 2022-02-04 | End: 2022-02-07

## 2022-02-04 RX ORDER — HYDROCODONE BITARTRATE AND ACETAMINOPHEN 7.5; 325 MG/1; MG/1
1 TABLET ORAL ONCE
Status: DISCONTINUED | OUTPATIENT
Start: 2022-02-04 | End: 2022-02-04

## 2022-02-04 RX ORDER — GREEN TEA/HOODIA GORDONII 315-12.5MG
1 CAPSULE ORAL 2 TIMES DAILY
Qty: 28 TABLET | Refills: 0 | Status: SHIPPED | OUTPATIENT
Start: 2022-02-04 | End: 2022-02-18

## 2022-02-04 RX ORDER — METOCLOPRAMIDE HYDROCHLORIDE 5 MG/ML
10 INJECTION INTRAMUSCULAR; INTRAVENOUS
Status: DISCONTINUED | OUTPATIENT
Start: 2022-02-04 | End: 2022-02-04 | Stop reason: HOSPADM

## 2022-02-04 RX ORDER — FENTANYL CITRATE 50 UG/ML
50 INJECTION, SOLUTION INTRAMUSCULAR; INTRAVENOUS EVERY 5 MIN PRN
Status: DISCONTINUED | OUTPATIENT
Start: 2022-02-04 | End: 2022-02-04 | Stop reason: HOSPADM

## 2022-02-04 RX ORDER — SCOLOPAMINE TRANSDERMAL SYSTEM 1 MG/1
1 PATCH, EXTENDED RELEASE TRANSDERMAL
Status: DISCONTINUED | OUTPATIENT
Start: 2022-02-04 | End: 2022-02-04 | Stop reason: HOSPADM

## 2022-02-04 RX ORDER — DEXAMETHASONE SODIUM PHOSPHATE 10 MG/ML
INJECTION, EMULSION INTRAMUSCULAR; INTRAVENOUS PRN
Status: DISCONTINUED | OUTPATIENT
Start: 2022-02-04 | End: 2022-02-04 | Stop reason: SDUPTHER

## 2022-02-04 RX ORDER — PROMETHAZINE HYDROCHLORIDE 25 MG/ML
12.5 INJECTION, SOLUTION INTRAMUSCULAR; INTRAVENOUS
Status: DISCONTINUED | OUTPATIENT
Start: 2022-02-04 | End: 2022-02-04 | Stop reason: HOSPADM

## 2022-02-04 RX ORDER — ONDANSETRON 2 MG/ML
INJECTION INTRAMUSCULAR; INTRAVENOUS PRN
Status: DISCONTINUED | OUTPATIENT
Start: 2022-02-04 | End: 2022-02-04 | Stop reason: SDUPTHER

## 2022-02-04 RX ORDER — MEPERIDINE HYDROCHLORIDE 25 MG/ML
12.5 INJECTION INTRAMUSCULAR; INTRAVENOUS; SUBCUTANEOUS EVERY 5 MIN PRN
Status: DISCONTINUED | OUTPATIENT
Start: 2022-02-04 | End: 2022-02-04 | Stop reason: HOSPADM

## 2022-02-04 RX ORDER — HYDROCODONE BITARTRATE AND ACETAMINOPHEN 5; 325 MG/1; MG/1
TABLET ORAL
Status: COMPLETED
Start: 2022-02-04 | End: 2022-02-04

## 2022-02-04 RX ORDER — ROCURONIUM BROMIDE 10 MG/ML
INJECTION, SOLUTION INTRAVENOUS PRN
Status: DISCONTINUED | OUTPATIENT
Start: 2022-02-04 | End: 2022-02-04 | Stop reason: SDUPTHER

## 2022-02-04 RX ADMIN — ROCURONIUM BROMIDE 50 MG: 50 INJECTION, SOLUTION INTRAVENOUS at 12:00

## 2022-02-04 RX ADMIN — FENTANYL CITRATE 50 MCG: 50 INJECTION, SOLUTION INTRAMUSCULAR; INTRAVENOUS at 12:50

## 2022-02-04 RX ADMIN — FENTANYL CITRATE 100 MCG: 50 INJECTION, SOLUTION INTRAMUSCULAR; INTRAVENOUS at 12:00

## 2022-02-04 RX ADMIN — HYDROCODONE BITARTRATE AND ACETAMINOPHEN 1 TABLET: 5; 325 TABLET ORAL at 15:05

## 2022-02-04 RX ADMIN — HYDROMORPHONE HYDROCHLORIDE 0.5 MG: 1 INJECTION, SOLUTION INTRAMUSCULAR; INTRAVENOUS; SUBCUTANEOUS at 13:57

## 2022-02-04 RX ADMIN — CEFAZOLIN SODIUM 2000 MG: 10 INJECTION, POWDER, FOR SOLUTION INTRAVENOUS at 12:00

## 2022-02-04 RX ADMIN — DEXAMETHASONE SODIUM PHOSPHATE 10 MG: 10 INJECTION, EMULSION INTRAMUSCULAR; INTRAVENOUS at 12:00

## 2022-02-04 RX ADMIN — FENTANYL CITRATE 50 MCG: 50 INJECTION, SOLUTION INTRAMUSCULAR; INTRAVENOUS at 12:47

## 2022-02-04 RX ADMIN — HYDROMORPHONE HYDROCHLORIDE 0.5 MG: 1 INJECTION, SOLUTION INTRAMUSCULAR; INTRAVENOUS; SUBCUTANEOUS at 13:50

## 2022-02-04 RX ADMIN — SODIUM CHLORIDE: 9 INJECTION, SOLUTION INTRAVENOUS at 08:45

## 2022-02-04 RX ADMIN — SUGAMMADEX 200 MG: 100 INJECTION, SOLUTION INTRAVENOUS at 13:15

## 2022-02-04 RX ADMIN — MIDAZOLAM 2 MG: 1 INJECTION INTRAMUSCULAR; INTRAVENOUS at 12:00

## 2022-02-04 RX ADMIN — ONDANSETRON 4 MG: 2 INJECTION INTRAMUSCULAR; INTRAVENOUS at 12:00

## 2022-02-04 ASSESSMENT — PULMONARY FUNCTION TESTS
PIF_VALUE: 16
PIF_VALUE: 15
PIF_VALUE: 16
PIF_VALUE: 1
PIF_VALUE: 16
PIF_VALUE: 16
PIF_VALUE: 15
PIF_VALUE: 16
PIF_VALUE: 11
PIF_VALUE: 15
PIF_VALUE: 0
PIF_VALUE: 19
PIF_VALUE: 16
PIF_VALUE: 1
PIF_VALUE: 0
PIF_VALUE: 15
PIF_VALUE: 16
PIF_VALUE: 0
PIF_VALUE: 16
PIF_VALUE: 15
PIF_VALUE: 15
PIF_VALUE: 16
PIF_VALUE: 16
PIF_VALUE: 15
PIF_VALUE: 16
PIF_VALUE: 12
PIF_VALUE: 23
PIF_VALUE: 1
PIF_VALUE: 16
PIF_VALUE: 15
PIF_VALUE: 1
PIF_VALUE: 15
PIF_VALUE: 16
PIF_VALUE: 16
PIF_VALUE: 15
PIF_VALUE: 15
PIF_VALUE: 16
PIF_VALUE: 16
PIF_VALUE: 15
PIF_VALUE: 16
PIF_VALUE: 16
PIF_VALUE: 2
PIF_VALUE: 15
PIF_VALUE: 15
PIF_VALUE: 16
PIF_VALUE: 16
PIF_VALUE: 6
PIF_VALUE: 15
PIF_VALUE: 16
PIF_VALUE: 15
PIF_VALUE: 16
PIF_VALUE: 15
PIF_VALUE: 16
PIF_VALUE: 0
PIF_VALUE: 16
PIF_VALUE: 15
PIF_VALUE: 16
PIF_VALUE: 15
PIF_VALUE: 15
PIF_VALUE: 16
PIF_VALUE: 1
PIF_VALUE: 15
PIF_VALUE: 15
PIF_VALUE: 16
PIF_VALUE: 15
PIF_VALUE: 16
PIF_VALUE: 1
PIF_VALUE: 0
PIF_VALUE: 16
PIF_VALUE: 16

## 2022-02-04 ASSESSMENT — PAIN SCALES - GENERAL
PAINLEVEL_OUTOF10: 0
PAINLEVEL_OUTOF10: 7
PAINLEVEL_OUTOF10: 7
PAINLEVEL_OUTOF10: 3
PAINLEVEL_OUTOF10: 7
PAINLEVEL_OUTOF10: 7

## 2022-02-04 ASSESSMENT — PAIN - FUNCTIONAL ASSESSMENT: PAIN_FUNCTIONAL_ASSESSMENT: 0-10

## 2022-02-04 NOTE — PROGRESS NOTES

## 2022-02-04 NOTE — BRIEF OP NOTE
Brief Postoperative Note      Patient: Ingris Hernandez  YOB: 1975  MRN: [id number]    Date of Procedure: 2/4/2022    Pre-Op Diagnosis: CHRONIC bilateral ETHMOIDITIS; CHRONIC RIGHT MAXILLARY SINUSITIS    Post-Op Diagnosis: Same;        Procedure(s):  IMAGE GUIDED ENDOSCOPY SURGERY BILATERAL ETHMOIDECTOMY(OSSEOUS RIGHT ETHMOID AIR CELLS OPACIFIED LEFT ETHMOID AIR CELL) RIGHT NASAL ANTRAL WINDOW    Surgeon(s):  Naresh Lopez MD    Assistant:  * No surgical staff found *    Anesthesia: General    Estimated Blood Loss (mL): 680     Complications: None    Specimens:   ID Type Source Tests Collected by Time Destination   1 : nasal fossa Swab Nose CULTURE, FUNGUS, GRAM STAIN (Canceled), CULTURE, AEROBIC Naresh Lopez MD 2/4/2022 1234    A : RIGHT ANTERIOR ETHMOID Tissue Nose SURGICAL PATHOLOGY Naresh Lopez MD 2/4/2022 1312    B : LEFT ANTERIOR ETHMOID Tissue Nose SURGICAL PATHOLOGY Naresh Lopez MD 2/4/2022 1313        Implants:  * No implants in log *      Drains: * No LDAs found *    Findings: 1. Localization of right and left chronically infected ethmoid sinuses was accurate and enabled extirpation of the involved sinuses with a margin of surrounding soft tissues. 2.  The nasal antral window was likewise facilitated by localization and allowed for a very wide opening of the sinus passage into the inferior meatus.     Electronically signed by Naresh Lopez MD on 2/4/2022 at 1:56 PM

## 2022-02-04 NOTE — ANESTHESIA PRE PROCEDURE
Department of Anesthesiology  Preprocedure Note       Name:  Jay Rincon   Age:  51 y.o.  :  1975                                          MRN:  [id number]         Date:  2022      Surgeon: Che Levi):  Morales Espinal MD    Procedure: Procedure(s):  IMAGE GUIDED ENDOSCOPY SURGERY BILATERAL ETHMOIDECTOMY(OSSEOUS RIGHT ETHMOID AIR CELLS OPACIFIED LEFT ETHMOID AIR CELL) RIGHT NASAL ANTRAL WINDOW    Medications prior to admission:   Prior to Admission medications    Medication Sig Start Date End Date Taking? Authorizing Provider   meclizine (ANTIVERT) 25 MG tablet Take 1 tablet by mouth 4 times daily as needed for Dizziness 22   Toshia Marin MD   ZINC PO Take by mouth    Historical Provider, MD   Fluticasone Propionate Robbin Curb) 93 MCG/ACT EXHU 1 spray by Nasal route 2 times daily 21   RICH Thayer CNP   dupilumab (DUPIXENT) 300 MG/2ML SOSY injection Inject 2 mLs into the skin every 14 days 21   RICH Thayer CNP   montelukast (SINGULAIR) 10 MG tablet Take 1 tablet by mouth nightly 21   RICH Thayer CNP   chlorpheniramine (CHLOR-TRIMETON) 2 MG/5ML syrup Take 2 mg by mouth every 4 hours as needed for Allergies    Historical Provider, MD   EPINEPHrine (AUVI-Q) 0.3 MG/0.3ML SOAJ injection Dispense 2 packs of 2 (total 4 devices). Use as directed, STAT for allergic reaction.  7/15/21   RICH Thayer CNP   Multiple Vitamin (MULTI VITAMIN DAILY PO) Take by mouth    Historical Provider, MD   Ascorbic Acid (VITAMIN C PO) Take by mouth    Historical Provider, MD   VITAMIN D PO Take by mouth    Historical Provider, MD   Probiotic Product (PROBIOTIC PO) Take by mouth 1 tab in morning    Historical Provider, MD   b complex vitamins capsule Take 1 capsule by mouth daily    Historical Provider, MD   cetirizine (ZYRTEC) 10 MG tablet Take 10 mg by mouth nightly Not in last 10 days    Historical Provider, MD   Pseudoephedrine HCl (SUDAFED 12 HOUR PO) Take 1 tablet by mouth daily     Historical Provider, MD       Current medications:    No current facility-administered medications for this visit. No current outpatient medications on file. Facility-Administered Medications Ordered in Other Visits   Medication Dose Route Frequency Provider Last Rate Last Admin    0.9 % sodium chloride infusion   IntraVENous Continuous Jabari Lópze  mL/hr at 02/04/22 0845 New Bag at 02/04/22 0845       Allergies:     Allergies   Allergen Reactions    Seasonal        Problem List:    Patient Active Problem List   Diagnosis Code    Seasonal allergies J30.2    Panic attacks F41.0    Asthma J45.909    Acute non-recurrent ethmoidal sinusitis J01.20    Chronic pansinusitis J32.4    Nasal obstruction J34.89    Nasal valve collapse J34.89    Obstructive sleep apnea G47.33    Chronic maxillary sinusitis J32.0    Chronic ethmoiditis J32.2    Abbey bullosa J34.89    Status post functional endoscopic sinus surgery (FESS) Z98.890    Nasal polyps J33.9    Allergic rhinoconjunctivitis J30.9, H10.10    Non-seasonal allergic rhinitis J30.89    Allergic rhinitis J30.9    Nasal polyposis J33.9       Past Medical History:        Diagnosis Date    Asthma     Panic attacks 4/98    PONV (postoperative nausea and vomiting)     Scopalamine patch    Seasonal allergies 4/99    Sinusitis, chronic        Past Surgical History:        Procedure Laterality Date    CYST REMOVAL      INTRAUTERINE DEVICE INSERTION      murena-  Out 2014    SINUS ENDOSCOPY  04/2021    dr Ian Rg Bilateral 4/30/2021    SINUS ENDOSCOPY FUNCTIONAL SURGERY IMAGE GUIDED, RIGHT MAXILLARY ANTROSTOMY WITH REMOVAL OF TISSUE FROM MAXILLARY SINUS, RIGHT PARTIAL ANTERIOR ETHMOIDECTOMY, NASAL ENDOSCOPY OF LEFT ABBEY BULLOSA performed by Jabari López MD at [address] [place] [address]  07/08/2014    Dr Denver Hamburg; Removal IUD       Social History:    Social History     Tobacco Use    Neck ROM: full  Mouth opening: > = 3 FB Dental:          Pulmonary:normal exam    (+) sleep apnea:  asthma:           Patient did not smoke on day of surgery. Cardiovascular:  Exercise tolerance: good (>4 METS),                     Neuro/Psych:   Negative Neuro/Psych ROS              GI/Hepatic/Renal: Neg GI/Hepatic/Renal ROS            Endo/Other: Negative Endo/Other ROS             Pt had no PAT visit       Abdominal:   (+) obese,           Vascular: negative vascular ROS. Other Findings:               Anesthesia Plan      general     ASA 2       Induction: intravenous. MIPS: Postoperative opioids intended and Prophylactic antiemetics administered. Anesthetic plan and risks discussed with patient.       Plan discussed with CRNA and surgical team.                  Jaja Bennett MD   2/4/2022

## 2022-02-04 NOTE — ANESTHESIA POSTPROCEDURE EVALUATION
Department of Anesthesiology  Postprocedure Note    Patient: Katja Flowers  MRN: [id number]  YOB: 1975  Date of evaluation: 2/4/2022  Time:  2:43 PM     Procedure Summary     Date: 02/04/22 Room / Location: [address] RENÉ Velásquez    Anesthesia Start: 5807 Anesthesia Stop: 6867    Procedure: IMAGE GUIDED ENDOSCOPY SURGERY BILATERAL ETHMOIDECTOMY(OSSEOUS RIGHT ETHMOID AIR CELLS OPACIFIED LEFT ETHMOID AIR CELL) RIGHT NASAL ANTRAL WINDOW (Bilateral Nose) Diagnosis: (CHRONIC ETHMOIDITIS CHRONIC MAXILLARY SINUSITIS)    Surgeons: Pedro Mcdowell MD Responsible Provider: David Pastor MD    Anesthesia Type: general ASA Status: 2          Anesthesia Type: general    Yael Phase I: Yael Score: 10    Yael Phase II:      Last vitals: Reviewed and per EMR flowsheets. Anesthesia Post Evaluation    Patient location during evaluation: PACU  Patient participation: complete - patient participated  Level of consciousness: awake and alert  Airway patency: patent  Nausea & Vomiting: no nausea and no vomiting  Complications: no  Cardiovascular status: hemodynamically stable  Respiratory status: acceptable  Hydration status: euvolemic      [facility]  POST-ANESTHESIA NOTE       Name:  Katja Flowers                                         Age:  51 y.o.   MRN:  [id number]      Last Vitals:  BP (!) 125/93   Pulse 71   Temp 96.2 °F (35.7 °C) (Temporal)   Resp 16   Ht 5' 3\" (1.6 m)   Wt 185 lb 12.8 oz (84.3 kg)   SpO2 93%   BMI 32.91 kg/m²   Patient Vitals for the past 4 hrs:   BP Temp Temp src Pulse Resp SpO2   02/04/22 1429 (!) 125/93 96.2 °F (35.7 °C) Temporal 71 16 93 %   02/04/22 1415 (!) 145/86   73 19 93 %   02/04/22 1410 137/78   83 18 93 %   02/04/22 1405 132/85   72 16 93 %   02/04/22 1400 117/79   78 18 93 %   02/04/22 1355 118/80   82 18 94 %   02/04/22 1350 123/80   88 14 93 %   02/04/22 1345 123/81   85 18 92 %   02/04/22 1340 112/73   87 14 93 %   02/04/22 1334 126/79 97.6 °F (36.4 °C) Temporal 87 18 93 %       Level of Consciousness:  Awake    Respiratory:  Stable    Oxygen Saturation:  Stable    Cardiovascular:  Stable    Hydration:  Adequate    PONV:  Stable    Post-op Pain:  Adequate analgesia    Post-op Assessment:  No apparent anesthetic complications    Additional Follow-Up / Treatment / Comment:  None    Yesenia Zaragoza MD  February 4, 2022   2:44 PM

## 2022-02-04 NOTE — PROGRESS NOTES
1334: pt arrived to PACU. VSS. Respirations unlabored. Pt arrived on room air. Pt states no pain. No drainage present from nose. 1340: VSS. Respirations unlabored. Pt responds to verbal stimulation. 1350: pt given dilaudid for 7/10 pain   1357: pt given dilaudid for 7/10 pain. VSS. Respirations unlabored   8410: pt meets discharge criteria from PACU.  Transported to [facility]

## 2022-02-04 NOTE — OP NOTE
Operative Note      Patient: Ivan Ross  YOB: 1975  MRN: [id number]    Date of Procedure: 2/4/2022    Pre-Op Diagnosis: Bilateral CHRONIC ETHMOIDITIS; RIGHT CHRONIC MAXILLARY SINUSITIS    Post-Op Diagnosis: Same       Procedure(s):  IMAGE GUIDED ENDOSCOPY SURGERY BILATERAL ETHMOIDECTOMY(OSSEOUS RIGHT ETHMOID AIR CELLS OPACIFIED LEFT ETHMOID AIR CELL) RIGHT NASAL ANTRAL WINDOW    Surgeon(s):  Paolo Donnelly MD    Assistant:   RICH Gaston      * No surgical staff found *    Anesthesia: General    Estimated Blood Loss (mL): 935     Complications: None    Specimens:   ID Type Source Tests Collected by Time Destination   1 : nasal fossa Swab Nose CULTURE, FUNGUS, GRAM STAIN (Canceled), CULTURE, AEROBIC Paolo Donnelly MD 2/4/2022 1234    A : RIGHT ANTERIOR ETHMOID Tissue Nose SURGICAL PATHOLOGY Paolo Donnelly MD 2/4/2022 1312    B : LEFT ANTERIOR ETHMOID Tissue Nose SURGICAL PATHOLOGY Paolo Donnelly MD 2/4/2022 1313        Implants:  * No implants in log *      Drains: * No LDAs found *    Findings: 1. Localization of right and left chronically infected ethmoid sinuses was accurate and enabled extirpation of the involved sinuses with a margin of surrounding soft tissues. 2.  The nasal antral window was likewise facilitated by localization and allowed for a very wide opening of the sinus passage into the inferior meatus. Detailed Description of Procedure: The patient was taken to the operating room awake and placed in the supine position. General anesthesia was induced the patient was intubated with a 7.0 endotracheal tube without difficulty or vital sign instability. The table was turned 90 degrees for the procedure. I performed a timeout verifying the patient's identity and planned procedure. Patient was prepped and draped in usual fashion for endonasal surgery using computer navigation with the Coeurative system.     After calibrating the Coeurative localization system to specifications, visualize the patient's nasal airways and found both be narrow secondary to congestion. I placed Afrin pledgets on both sides and was able to shrink the mucosa generously to enable excellent visualization. I first turned my attention to the right sided ethmoid air cells are chronically infected and \"radiodense\" concerning for chronic fungal disease. Using the localization system and multiple registered instruments, I identified the area containing these air cells and, more importantly, the areas of the skull base which were open to the nasal vault to avoid penetrating the CNS. I then used a combination of a power debrider system using 12 degree rotating blade as well as a J curette and up-biting Blakesley forceps. Identifying the abnormal air cells I entered them and once open I placed cotton tipped culture swabs into the sinus vault and rotated them to  as much of the end of sinus debris as possible. They were placed in a nutrient broth and sent for aerobic and fungal cultures. I used combination of suction cautery and topical Afrin to achieve hemostasis. Right nasal antral window: Again using localization system, I was able to find the ideal location in the maxillary sinus wall adjacent to the inferior meatus. I popped into the inferior meatus after localizing this ideal spot with a J curette followed by a curved suction catheter that was calibrated to the localization system. Once I verify that I was in fact in the maxillary sinus by this method, I used a combination of straight biting and backbiting instruments to open this aperture widely. I also used the debrider blade. .  Suction cautery to achieve hemostasis. I verified the penetration point relative to the floor of the sinus endoscopically as well as by localization. I irrigated out the sinus with copious amounts of sterile saline and placed Afrin pledgets to turn my attention to the left side.     Left anterior ethmoid resection: After visualizing the left side and identifying the location of the chronic sinus disease, as I had on the right side, I penetrated this sinus confluence with the debrider system first followed by up-biting Blakesley forceps with which I removed some Endo sinus tissues and handed off the field replaced in formalin for permanent section. I then debrided additional associated proximal sinuses anteriorly posteriorly and laterally taking care to avoid entering the orbit. Used suction cautery for hemostasis. I also verified the position of the affected sinuses with additional instruments including a calibrated sinus \"seeker\". This demonstrated that there were additional sinuses that were part of this chronically infected nidus, so I passed the debrider back into this region and opened another layer of cells right up to the skull base. Once done I used suction cautery for hemostasis. After defecation of Afrin pledgets into both nasal airways, I again endoscopically examined both sides to look for areas that needed additional suction cautery. This was applied. I then irrigated out both nasal airways with copious amounts of sterile saline until the irrigant was clear of active bleeding. I suctioned out the nasopharynx and the oropharynx to confirm adequate hemostasis. Then turned the patient back to anesthesia for reversal extubation. This was carried out without incident and the patient was taken to recovery in satisfactory condition. Estimated blood loss in the case was less than 150 cc and the patient received approximately a liter of intravenous lactated Ringer's intraoperatively. No blood products were transfused. No intraoperative complications were detected. Counts were considered accurate x3. I was present for and performed the patient's entire operation myself.         Electronically signed by Arjun Arreguin MD on 2/4/2022 at 2:00 PM

## 2022-02-06 LAB
AEROBIC CULTURE: ABNORMAL
AEROBIC CULTURE: ABNORMAL
ORGANISM: ABNORMAL

## 2022-02-07 ENCOUNTER — TELEPHONE (OUTPATIENT)
Dept: ENT CLINIC | Age: 47
End: 2022-02-07

## 2022-02-07 NOTE — TELEPHONE ENCOUNTER
I am not sure what message that is. We have her using the Levaquin as prescribed after surgery. I do not see that anyone else has reached out to her about this.

## 2022-02-07 NOTE — TELEPHONE ENCOUNTER
Pt is calling bc she seen mychart msg said not to use the antibiotic that was prescribedlevoFLOXacin (LEVAQUIN) 750 MG tablet   She is wondering if she should use a different antibiotic?     Please advise

## 2022-02-08 NOTE — TELEPHONE ENCOUNTER
I can see the finalized culture with sensitivity. I will review with Dr Tatiana Villegas, but he will likely change antibiotic to something that treats Staph. I apologize, I thought she was referring to someone instructing her not to take the [address] [place] [address] Rd.

## 2022-02-08 NOTE — TELEPHONE ENCOUNTER
She said she seen her labs in [place] and its said Ciprofloxacin and Levofloxacin, regardless of in vitro sensitivity, should not be used for staphylococcal infections other than uncomplicated lower UTIs.

## 2022-02-09 RX ORDER — SULFAMETHOXAZOLE AND TRIMETHOPRIM 800; 160 MG/1; MG/1
1 TABLET ORAL 2 TIMES DAILY
Qty: 20 TABLET | Refills: 0 | Status: SHIPPED | OUTPATIENT
Start: 2022-02-09 | End: 2022-02-19

## 2022-02-09 NOTE — TELEPHONE ENCOUNTER
Dr Karissa Huitron and Ander Rosenbaum, do you have any recommendations on the antibiotics?  The MSSA is sensitive to Bactrim on the susceptibility

## 2022-02-15 ENCOUNTER — OFFICE VISIT (OUTPATIENT)
Dept: ENT CLINIC | Age: 47
End: 2022-02-15
Payer: COMMERCIAL

## 2022-02-15 VITALS
TEMPERATURE: 97.1 F | BODY MASS INDEX: 33.13 KG/M2 | SYSTOLIC BLOOD PRESSURE: 118 MMHG | OXYGEN SATURATION: 96 % | DIASTOLIC BLOOD PRESSURE: 78 MMHG | HEART RATE: 85 BPM | RESPIRATION RATE: 14 BRPM | WEIGHT: 187 LBS

## 2022-02-15 DIAGNOSIS — J32.0 CHRONIC MAXILLARY SINUSITIS: Primary | ICD-10-CM

## 2022-02-15 DIAGNOSIS — J33.9 NASAL POLYPOSIS: ICD-10-CM

## 2022-02-15 PROCEDURE — 31237 NSL/SINS NDSC SURG BX POLYPC: CPT | Performed by: OTOLARYNGOLOGY

## 2022-02-15 PROCEDURE — 99024 POSTOP FOLLOW-UP VISIT: CPT | Performed by: OTOLARYNGOLOGY

## 2022-02-15 RX ORDER — DUPILUMAB 300 MG/2ML
INJECTION, SOLUTION SUBCUTANEOUS
COMMUNITY
Start: 2022-02-10 | End: 2022-05-02 | Stop reason: SDUPTHER

## 2022-02-15 NOTE — PROGRESS NOTES
[facility] [place] [facility]  [facility] EAR, NOSE AND THROAT  [facility]  Dept: [phone]  Dept Fax: [phone]  Loc: [phone]    Lashae Andujar is a 51 y.o. female who was referred by No ref. provider found for:  Chief Complaint   Patient presents with    Post-Op Check     pt is here for post op       HPI:     Lashae Andujar is a 51 y.o. female with a history of chronic rhinosinusitis and right maxillary sinus pooling status post revision ethmoidectomy and nasal antral window. The patient reports the nasal antral window working well initially but having stopped draining the sinus over the last few days. She denies pain. History: Allergies   Allergen Reactions    Seasonal      Current Outpatient Medications   Medication Sig Dispense Refill    sulfamethoxazole-trimethoprim (BACTRIM DS;SEPTRA DS) 800-160 MG per tablet Take 1 tablet by mouth 2 times daily for 10 days 20 tablet 0    Probiotic Acidophilus (FLORANEX) TABS Take 1 tablet by mouth 2 times daily for 14 days 28 tablet 0    meclizine (ANTIVERT) 25 MG tablet Take 1 tablet by mouth 4 times daily as needed for Dizziness 40 tablet 1    ZINC PO Take by mouth      Fluticasone Propionate (XHANCE) 93 MCG/ACT EXHU 1 spray by Nasal route 2 times daily 6.3 mL 11    dupilumab (DUPIXENT) 300 MG/2ML SOSY injection Inject 2 mLs into the skin every 14 days 2 Syringe 11    montelukast (SINGULAIR) 10 MG tablet Take 1 tablet by mouth nightly 90 tablet 1    chlorpheniramine (CHLOR-TRIMETON) 2 MG/5ML syrup Take 2 mg by mouth every 4 hours as needed for Allergies      EPINEPHrine (AUVI-Q) 0.3 MG/0.3ML SOAJ injection Dispense 2 packs of 2 (total 4 devices). Use as directed, STAT for allergic reaction.  2 each 0    Multiple Vitamin (MULTI VITAMIN DAILY PO) Take by mouth      Ascorbic Acid (VITAMIN C PO) Take by mouth      VITAMIN D PO Take by mouth      Probiotic Product (PROBIOTIC PO) Take by mouth 1 tab in morning      b complex vitamins capsule Take 1 capsule by mouth daily      cetirizine (ZYRTEC) 10 MG tablet Take 10 mg by mouth nightly Not in last 10 days      Pseudoephedrine HCl (SUDAFED 12 HOUR PO) Take 1 tablet by mouth daily       DUPIXENT 300 MG/2ML SOPN injection        No current facility-administered medications for this visit. Past Medical History:   Diagnosis Date    Asthma     Panic attacks 4/98    PONV (postoperative nausea and vomiting)     Scopalamine patch    Seasonal allergies 4/99    Sinusitis, chronic       Past Surgical History:   Procedure Laterality Date    CYST REMOVAL      INTRAUTERINE DEVICE INSERTION      murena-  Out 2014    SINUS ENDOSCOPY  04/2021    dr Viridiana Enriquez Bilateral 04/30/2021    SINUS ENDOSCOPY FUNCTIONAL SURGERY IMAGE GUIDED, RIGHT MAXILLARY ANTROSTOMY WITH REMOVAL OF TISSUE FROM MAXILLARY SINUS, RIGHT PARTIAL ANTERIOR ETHMOIDECTOMY, NASAL ENDOSCOPY OF LEFT ABBEY BULLOSA performed by Rd Alatorre MD at [address] [place] [address] Bilateral 2/4/2022    IMAGE GUIDED ENDOSCOPY SURGERY BILATERAL ETHMOIDECTOMY(OSSEOUS RIGHT ETHMOID AIR CELLS OPACIFIED LEFT ETHMOID AIR CELL) RIGHT NASAL ANTRAL WINDOW performed by Rd Alatorre MD at [address]  02/04/2022    dr Melvin Quiñonez  07/08/2014    Dr Desai Neal; Removal IUD     Family History   Problem Relation Age of Onset    Breast Cancer Mother 52    Breast Cancer Maternal Grandmother 48     Social History     Tobacco Use    Smoking status: Never Smoker    Smokeless tobacco: Never Used   Substance Use Topics    Alcohol use: Yes     Comment: occasinally        Subjective:      Review of Systems  Rest of review of systems are negative, except as noted in HPI.      Objective:     /78 (Site: Right Upper Arm, Position: Sitting)   Pulse 85   Temp 97.1 °F (36.2 °C)   Resp 14   Wt 187 lb (84.8 kg)   SpO2 96%   BMI 33.13 kg/m² Physical Exam     Vitals reviewed. Procedure: Nasal endoscopy with debridement  Findings: The patient had soft tissue debris in the ethmoid vault which I resected with Blakesley forceps and suction with irrigation. Her nasal antral window was also closed with soft tissue debris. I punched and through the nasal antral window with the scope and then suctioned away a soft tissue debris plug mixed with blood. Sinus to be open and to drain. Patient tolerated the procedure well. CT FACIAL BONES WO CONTRAST    Result Date: 1/24/2022   1. Residual mucosal thickening in the right maxillary sinus. The drainage passageway is patent. 2. No air-fluid levels. 3. Postsurgical changes as described. **This report has been created using voice recognition software. It may contain minor errors which are inherent in voice recognition technology. ** Final report electronically signed by Dr. Jose R Armijo on 1/24/2022 9:06 AM     Lab Results   Component Value Date     03/05/2021    K 4.2 03/05/2021     03/05/2021    CO2 22 03/05/2021    BUN 8 03/05/2021    CREATININE 0.74 03/05/2021    CALCIUM 9.2 03/05/2021    LABALBU 4.2 03/05/2021    BILITOT 0.5 03/05/2021    ALKPHOS 78 03/05/2021    AST 22 03/05/2021    ALT 23 03/05/2021       All of the past medical history, past surgical history, family history,social history, allergies and current medications were reviewed with the patient. Assessment & Plan   Diagnoses and all orders for this visit:     Diagnosis Orders   1. Chronic maxillary sinusitis  NH NASAL SCOPE,BX/RMV POLYP/DEBRID   2. Nasal polyposis  NH NASAL SCOPE,BX/RMV POLYP/DEBRID       The patient is doing well status post revision ethmoidectomy and nasal antral window. I lyse the synechia also between the residual superior turbinate and the lateral wall. Bleeding was modest and self-limited and the patient tolerated it well.   I will see her back in approximately 2 weeks for a repeat nasal debridement to keep the and a window open. If it continues to try to close, further office procedures will be offered to her but I will also offer her a revision and a window if necessary. Return in about 2 weeks (around 3/1/2022) for Nasal debridement. **This report has been created using voice recognition software. It may contain minor errors which are inherent in voice recognition technology. **

## 2022-02-16 DIAGNOSIS — J30.9 CHRONIC ALLERGIC RHINITIS: Primary | ICD-10-CM

## 2022-02-16 DIAGNOSIS — Z29.8 IMMUNOTHERAPY: ICD-10-CM

## 2022-02-16 PROCEDURE — 95165 ANTIGEN THERAPY SERVICES: CPT | Performed by: NURSE PRACTITIONER

## 2022-02-16 NOTE — PROGRESS NOTES
ALLERGY EXTRACT - NEW INJECTION BUILDING PHASE    TOTAL VIALS PREPARED = 15    TOTAL INJECTABLE DOSES = 50 INJECTIONS PER SET  TOTAL SETS PREPARED = 3 SETS  TOTAL ANTICIPATED DOSES = 150 INJECTIONS     An allergy extract was prepared according to written prescription by provider. Provider onsite during allergy extract preparation. Patient vial(s) include the following:     RED VIAL - 1:1 CONCENTRATION - EXPIRES 12 MONTHS  YELLOW VIAL-1:10 CONCENTRATION - EXPIRES 12 MONTHS  BLUE VIAL-1:100 CONCENTRATION - EXPIRES 12 MONTHS  GREEN VIAL-1:1,000 CONCENTRATION - EXPIRES 3 MONTHS  SILVER VIAL-1:10,000 CONCENTRATION - EXPIRES 3 MONTHS    Allergy extract was prepared by the following method:   RED TO YELLOW:  Once the  one-to-one concentration was prepared in the red vial, 0.5 ML's was then extracted in place into the yellow vial to achieve a 1:10 concentration. YELLOW TO BLUE:  Then 0.5 ML's was extracted from the yellow vial and placed into the blue file with dilutant to achieve a 1:100 concentration. BLUE TO GREEN:  Then 0.5 ML's was extracted from the blue vial and placed into Green vial with dilute to achieve a 1:1,000 concentration. GREEN TO SILVER:  Then 0.5 ML's was taken from the green vial and placed in the Silver vial with dilutant to achieve a 1:10,000 concentration. Patient vials were labeled with name, date-of-birth, vial (A,B,or C), concentration, and expiration. When injecting from a new  vial the first injections is 0.05 ml and are increased by 0.05 increments until 0.5ml from the vial is achieved or the patient reaches maximum tolerated dose. Injections are given once ot three times weekly and at least 24 hours apart, according to provider orders. If patient has complications or \"knots\" or maximum tolerance the dose building is reduced, stopped, or maintained according to patient reaction and provider orders. This is documented on the injection log.     Patients on build-up plan should be seen by provider every 6 months. The injection record and serum is reviewed and documented at every injection appointment. When down to the last 1/3 of the bottle of the red 1:1 concentration the patient should be scheduled an appointment for new orders for allergy maintenance concentrations. If it is the patients preference to receive and injection at an outside medical office, is is allowed only after the patient receives the first dose from each vial at this practice. Patients requesting refills that receive injections at outside medical facilities must include the patient's demographic sheet, current insurance information and the injection records. Allow 2-3 weeks for delivery after the refill has been requested. PROTOCOL FOR LATE INJECTIONS  Days late determined from the due date of the injection    Shot Frequency 5-10 Days Late 11-16 Days Late 17-30 Days Late 31-60 Days Late 61+ Days Late   Twice Weekly Repeat last dose Reduce last dose . 2 Reduce last dose . 4 Dilute back 1 vial, start at .05 Patient must start over     Weekly Repeat last dose Reduce last dose . 2 Reduce last dose . 4 Dilute back 1 vial, start at .05 Patient must start over   Every 2 Weeks Repeat last dose Reduce last dose . 2. Reduce last dose . 4 Ask provider for instruction Ask provider for instruction   Every 3 Weeks Repeat last dose Reduce last dose . 2 Reduce last dose . 4 Ask provider for instruction Ask provider for instruction   Every 4 Weeks Repeat last dose Reduce last dose . 2 Reduce last dose . 4 Ask provider for instruction Ask provider for instruction     Patient/gaurdian made aware of potential anaphylaxis risks associated with receiving allergy injections and wishes to proceed.   SHOT REACTION TREATMENT INSTRUCTIONS    During the 30 minute wait after an allergy injection the following symptoms should be reported:    Itching other than at the injection site  Hives or swelling other than at the injection site  Redness other than at the injection site  Difficulty breathing  Chest tightness  Difficulty swallowing  Throat tightness    If these symptoms occur, NOTIFY PROVIDER and the following treatment should be administered:    1. Epinephrine 1:1000 IM - 0.3 ml if > 66 lbs or more, 0.15 ml if 33 - 63 lbs, or 0.1 ml if <33 lbs   2. Diphenhydramine - give all intramuscular:     2 to <6 years (off-label use): 6.25 mg,    6 to <12 years: 12.5 to 25 mg;    ?12 years: 25-50 mg.  3.  Famotidine:  Adults 40 mg oral    Adolescents age 12 years and >88 lbs: 40 mg    Children and Adolescents ? 12years of age: Initial: 0.25 mg/kg/dose   every 12 hours (maximum daily dose: 40 mg/day)    Epi dose may me repeated in 5-15 minutes if adequate resolution of symptoms does not occur    Patient should be observed for at least one hour after final epi dose and must be seen by provider. Patients cannot drive themselves if they have received diphenhydramine.

## 2022-02-23 ENCOUNTER — NURSE ONLY (OUTPATIENT)
Dept: ALLERGY | Age: 47
End: 2022-02-23
Payer: COMMERCIAL

## 2022-02-23 VITALS
SYSTOLIC BLOOD PRESSURE: 118 MMHG | TEMPERATURE: 97.4 F | OXYGEN SATURATION: 97 % | DIASTOLIC BLOOD PRESSURE: 84 MMHG | HEART RATE: 97 BPM

## 2022-02-23 DIAGNOSIS — J30.1 ALLERGY TO TREES: ICD-10-CM

## 2022-02-23 DIAGNOSIS — Z91.09 MITE ALLERGY: Primary | ICD-10-CM

## 2022-02-23 DIAGNOSIS — Z51.6 ENCOUNTER FOR DESENSITIZATION TO ALLERGENS: ICD-10-CM

## 2022-02-23 DIAGNOSIS — Z91.048 ALLERGY TO MOLD: ICD-10-CM

## 2022-02-23 PROCEDURE — 95117 IMMUNOTHERAPY INJECTIONS: CPT | Performed by: NURSE PRACTITIONER

## 2022-02-23 NOTE — PROGRESS NOTES
After consent obtained/verified, allergy injection given in back of R/L arm(s). VIAL COLOR OF ALL VIALS TODAY IS Silver    ALLERGY INJECTION FROM VIAL A GIVEN right  UPPER ARM IN THE AMOUNT OF 0.05 ML    ALLERGY INJECTION FROM VIAL B GIVEN left upper ARM IN THE AMOUNT OF 0.05 ML    ALLERGY INJECTION FROM VIAL C GIVEN leftlower ARM IN THE AMOUNT OF 0.05 ML      Documentation of vial injection specific to arm(s) noted on Allergy Immunotherapy Administration Form. Patient waited 30 minutes for observation. Yes      Patient tolerated well without adverse reaction WHILE IN OFFICE    SHOT REACTION TREATMENT INSTRUCTIONS    During the 30 minute wait after an allergy injection the following symptoms should be reported:    Itching other than at the injection site  Hives or swelling other than at the injection site  Redness other than at the injection site  Difficulty breathing  Chest tightness  Difficulty swallowing  Throat tightness    If these symptoms occur, NOTIFY PROVIDER and the following treatment should be administered:    1. Epinephrine/Auvi Q 1:1000 IM - 0.3 ml if > 66 lbs or more, 0.15 ml if 33 - 63 lbs, or 0.1 ml if <33 lbs     2. Diphenhydramine - give all intramuscular:     2 to <6 years (off-label use): 6.25 mg,    6 to <12 years: 12.5 to 25 mg;    ?12 years: 25-50 mg.    3.  Famotidine:  Adults 40 mg oral    Adolescents age 12 years and >88 lbs: 40 mg    Children and Adolescents ? 12years of age: Initial: 0.25 mg/kg/dose  every 12 hours (maximum daily dose: 40 mg/day)    Epi/Auvi Q dose may me repeated in 5-15 minutes if adequate resolution of symptoms does not occur    Patient should be observed for at least one hour after final Epi/Auvi Q dose and must be seen by provider. Patients cannot drive themselves if they have received diphenhydramine.

## 2022-02-28 ENCOUNTER — OFFICE VISIT (OUTPATIENT)
Dept: ENT CLINIC | Age: 47
End: 2022-02-28
Payer: COMMERCIAL

## 2022-02-28 ENCOUNTER — NURSE ONLY (OUTPATIENT)
Dept: ALLERGY | Age: 47
End: 2022-02-28
Payer: COMMERCIAL

## 2022-02-28 VITALS
RESPIRATION RATE: 18 BRPM | DIASTOLIC BLOOD PRESSURE: 78 MMHG | OXYGEN SATURATION: 98 % | TEMPERATURE: 98.7 F | SYSTOLIC BLOOD PRESSURE: 122 MMHG | BODY MASS INDEX: 32.77 KG/M2 | WEIGHT: 185 LBS | HEART RATE: 92 BPM

## 2022-02-28 VITALS
HEART RATE: 92 BPM | RESPIRATION RATE: 16 BRPM | TEMPERATURE: 98.7 F | WEIGHT: 185 LBS | HEIGHT: 63 IN | BODY MASS INDEX: 32.78 KG/M2 | OXYGEN SATURATION: 98 % | SYSTOLIC BLOOD PRESSURE: 122 MMHG | DIASTOLIC BLOOD PRESSURE: 78 MMHG

## 2022-02-28 DIAGNOSIS — J30.1 ALLERGY TO TREES: ICD-10-CM

## 2022-02-28 DIAGNOSIS — Z91.09 MITE ALLERGY: Primary | ICD-10-CM

## 2022-02-28 DIAGNOSIS — J32.2 CHRONIC ETHMOIDITIS: Primary | ICD-10-CM

## 2022-02-28 DIAGNOSIS — Z91.048 ALLERGY TO MOLD: ICD-10-CM

## 2022-02-28 PROCEDURE — 31237 NSL/SINS NDSC SURG BX POLYPC: CPT | Performed by: OTOLARYNGOLOGY

## 2022-02-28 PROCEDURE — 99024 POSTOP FOLLOW-UP VISIT: CPT | Performed by: OTOLARYNGOLOGY

## 2022-02-28 PROCEDURE — 95117 IMMUNOTHERAPY INJECTIONS: CPT | Performed by: NURSE PRACTITIONER

## 2022-02-28 NOTE — PROGRESS NOTES
After consent obtained/verified, allergy injection given in back of R/L arm(s). VIAL COLOR OF ALL VIALS TODAY IS silver    ALLERGY INJECTION FROM VIAL A GIVEN left  UPPER ARM IN THE AMOUNT OF 0.10 ML    ALLERGY INJECTION FROM VIAL B GIVEN right upper ARM IN THE AMOUNT OF 0.10 ML    ALLERGY INJECTION FROM VIAL C GIVEN rightlower ARM IN THE AMOUNT OF 0.10 ML      Documentation of vial injection specific to arm(s) noted on Allergy Immunotherapy Administration Form. Patient waited 30 minutes for observation. Yes      Patient tolerated well without adverse reaction WHILE IN OFFICE    SHOT REACTION TREATMENT INSTRUCTIONS    During the 30 minute wait after an allergy injection the following symptoms should be reported:    Itching other than at the injection site  Hives or swelling other than at the injection site  Redness other than at the injection site  Difficulty breathing  Chest tightness  Difficulty swallowing  Throat tightness    If these symptoms occur, NOTIFY PROVIDER and the following treatment should be administered:    1. Epinephrine/Auvi Q 1:1000 IM - 0.3 ml if > 66 lbs or more, 0.15 ml if 33 - 63 lbs, or 0.1 ml if <33 lbs     2. Diphenhydramine - give all intramuscular:     2 to <6 years (off-label use): 6.25 mg,    6 to <12 years: 12.5 to 25 mg;    ?12 years: 25-50 mg.    3.  Famotidine:  Adults 40 mg oral    Adolescents age 12 years and >88 lbs: 40 mg    Children and Adolescents ? 12years of age: Initial: 0.25 mg/kg/dose  every 12 hours (maximum daily dose: 40 mg/day)    Epi/Auvi Q dose may me repeated in 5-15 minutes if adequate resolution of symptoms does not occur    Patient should be observed for at least one hour after final Epi/Auvi Q dose and must be seen by provider. Patients cannot drive themselves if they have received diphenhydramine.

## 2022-03-01 ENCOUNTER — TELEPHONE (OUTPATIENT)
Dept: ENT CLINIC | Age: 47
End: 2022-03-01

## 2022-03-01 RX ORDER — SULFAMETHOXAZOLE AND TRIMETHOPRIM 800; 160 MG/1; MG/1
1 TABLET ORAL 2 TIMES DAILY
Qty: 28 TABLET | Refills: 0 | Status: SHIPPED | OUTPATIENT
Start: 2022-03-01 | End: 2022-03-15

## 2022-03-01 NOTE — TELEPHONE ENCOUNTER
Rx sent to pharmacy by Dr Cher Vogt. I believe I heard him talking on the phone to let her know about the Rx.

## 2022-03-01 NOTE — TELEPHONE ENCOUNTER
Patient called said Dr. Anastacia Nichols was going to put a prescription in for bactrim for 14 days. She said she hasn't heard or seen it put in and was wondering when it will be done.

## 2022-03-01 NOTE — PROGRESS NOTES
[facility]YSIC[facility]ANS [place] [facility]  [facility] EAR, NOSE AND THROAT  [facility]  Dept: [phone]  Dept Fax: [phone]  Loc: [phone]    Lashae Bojorquez is a 51 y.o. female who was referred by No ref. provider found for:  Chief Complaint   Patient presents with   Lawrence Valdez Post-Op Check     patient is here for post op ethmoidectomy and rt nasal antral window 22       HPI:     Lashae Bojorquez is a 51 y.o. female is here complaining of return of symptoms of face pressure and some discomfort as well as the \"smell like something has  in my nose\". This was the complaint she had preoperatively. The concerns are very much that she has detected this again. Since then the smell is gone away again but she is concerned the process has recurred. History: Allergies   Allergen Reactions    Seasonal      Current Outpatient Medications   Medication Sig Dispense Refill    sulfamethoxazole-trimethoprim (BACTRIM DS;SEPTRA DS) 800-160 MG per tablet Take 1 tablet by mouth 2 times daily for 14 days 28 tablet 0    ALLERGEN EXTRACT once      DUPIXENT 300 MG/2ML SOPN injection       meclizine (ANTIVERT) 25 MG tablet Take 1 tablet by mouth 4 times daily as needed for Dizziness 40 tablet 1    ZINC PO Take by mouth      Fluticasone Propionate (XHANCE) 93 MCG/ACT EXHU 1 spray by Nasal route 2 times daily 6.3 mL 11    dupilumab (DUPIXENT) 300 MG/2ML SOSY injection Inject 2 mLs into the skin every 14 days 2 Syringe 11    montelukast (SINGULAIR) 10 MG tablet Take 1 tablet by mouth nightly 90 tablet 1    chlorpheniramine (CHLOR-TRIMETON) 2 MG/5ML syrup Take 2 mg by mouth every 4 hours as needed for Allergies      EPINEPHrine (AUVI-Q) 0.3 MG/0.3ML SOAJ injection Dispense 2 packs of 2 (total 4 devices). Use as directed, STAT for allergic reaction.  2 each 0    Multiple Vitamin (MULTI VITAMIN DAILY PO) Take by mouth      Ascorbic Acid (VITAMIN C PO) Take by mouth  VITAMIN D PO Take by mouth      Probiotic Product (PROBIOTIC PO) Take by mouth 1 tab in morning      b complex vitamins capsule Take 1 capsule by mouth daily      cetirizine (ZYRTEC) 10 MG tablet Take 10 mg by mouth nightly Not in last 10 days      Pseudoephedrine HCl (SUDAFED 12 HOUR PO) Take 1 tablet by mouth daily        No current facility-administered medications for this visit. Past Medical History:   Diagnosis Date    Asthma     Panic attacks 4/98    PONV (postoperative nausea and vomiting)     Scopalamine patch    Seasonal allergies 4/99    Sinusitis, chronic       Past Surgical History:   Procedure Laterality Date    CYST REMOVAL      INTRAUTERINE DEVICE INSERTION      murena-  Out 2014    SINUS ENDOSCOPY  04/2021    dr Paula eB Bilateral 04/30/2021    SINUS ENDOSCOPY FUNCTIONAL SURGERY IMAGE GUIDED, RIGHT MAXILLARY ANTROSTOMY WITH REMOVAL OF TISSUE FROM MAXILLARY SINUS, RIGHT PARTIAL ANTERIOR ETHMOIDECTOMY, NASAL ENDOSCOPY OF LEFT ABBEY BULLOSA performed by Emelyn Hogan MD at [address] [place] [address] 2/4/2022    IMAGE GUIDED ENDOSCOPY SURGERY BILATERAL ETHMOIDECTOMY(OSSEOUS RIGHT ETHMOID AIR CELLS OPACIFIED LEFT ETHMOID AIR CELL) RIGHT NASAL ANTRAL WINDOW performed by Emelyn Hogan MD at [facility]  02/04/2022    dr Chen Alejandro  07/08/2014    Dr Sherlyn Vazquez; Removal IUD     Family History   Problem Relation Age of Onset    Breast Cancer Mother 52    Breast Cancer Maternal Grandmother 48     Social History     Tobacco Use    Smoking status: Never Smoker    Smokeless tobacco: Never Used   Substance Use Topics    Alcohol use: Yes     Comment: occasinally        Subjective:      Review of Systems  Rest of review of systems are negative, except as noted in HPI.      Objective:     /78 (Site: Left Upper Arm, Position: Sitting)   Pulse 92   Temp 98.7 °F (37.1 °C) (Infrared)   Resp 16   Ht 5' 3\" (1.6 m)   Wt 185 lb (83.9 kg)   SpO2 98%   BMI 32.77 kg/m²     Physical Exam       On general physical exam the patient is pleasant alert and cooperative middle-aged female in no acute distress. Her voice was neither hyper nor hyponasal.    Procedure: Nasal endoscopy with debridement  Into the patient's right nasal airway without difficulty. I can immediately see the obstructing mucus and soft tissue debris in the opening to the ethmoid air cells that had been opened and the most recent operation. This tissue was thick and tenacious and required packing to disrupt. I used Blakesley forceps to remove additional amounts of soft tissue as well. Beyond that I could see some mucous membrane hyperemia and mucopurulence. I irrigated out this area thoroughly. The patient tolerated the process well. I then entered the maxillary sinus and retrograde could see the nasal antral window. I went into the middle meatus after that and was able to intubate the nasal antral window that was tighter than it had been on the first endoscopy. Despite this, the patient tolerated the entire procedure well. Vitals reviewed. No results found. Lab Results   Component Value Date     03/05/2021    K 4.2 03/05/2021     03/05/2021    CO2 22 03/05/2021    BUN 8 03/05/2021    CREATININE 0.74 03/05/2021    CALCIUM 9.2 03/05/2021    LABALBU 4.2 03/05/2021    BILITOT 0.5 03/05/2021    ALKPHOS 78 03/05/2021    AST 22 03/05/2021    ALT 23 03/05/2021       All of the past medical history, past surgical history, family history,social history, allergies and current medications were reviewed with the patient. Assessment & Plan   Diagnoses and all orders for this visit:     Diagnosis Orders   1.  Chronic ethmoiditis  CT FACIAL BONES WO CONTRAST    NH NASAL SCOPE,BX/RMV POLYP/DEBRID       Based on patient's history and these physical findings, it seems likely that she is falling off the posterior superior ethmoid air cells and producing a fetid odor of the obstructed secretions with secondary infection. I want her to increase her irrigations to 3 times a day including blowing her nose with some of the fluid within the nose to force it into the closed cavities. I will also place her back on Bactrim DS for the next 14 days to hopefully eliminate the bacteria that is responsible for this process while the mucous membranes heal an additional increment better. She will return to see me in approximately 8 weeks with a repeat CT scan of the sinuses on that visit. If she has blocked of any of the ethmoid sinuses on that visit I may recommend a revision extirpation of those sinus septa to improve the drainage in that region. If her symptoms return and are not responsive to the antibiotic regimen, want to see her sooner and we will schedule surgery that much earlier. I explained all this in detail to the patient to her satisfaction. She reported understanding the basis of my recommendations and being willing to proceed as such. Return in about 2 months (around 5/3/2022) for Follow-up evaluation and review of CT scan. **This report has been created using voice recognition software. It may contain minor errors which are inherent in voice recognition technology. **

## 2022-03-03 ENCOUNTER — NURSE ONLY (OUTPATIENT)
Dept: ALLERGY | Age: 47
End: 2022-03-03
Payer: COMMERCIAL

## 2022-03-03 VITALS
OXYGEN SATURATION: 96 % | SYSTOLIC BLOOD PRESSURE: 114 MMHG | DIASTOLIC BLOOD PRESSURE: 70 MMHG | TEMPERATURE: 96.8 F | RESPIRATION RATE: 14 BRPM | HEART RATE: 84 BPM

## 2022-03-03 DIAGNOSIS — Z91.09 MITE ALLERGY: ICD-10-CM

## 2022-03-03 DIAGNOSIS — Z51.6 ENCOUNTER FOR DESENSITIZATION TO ALLERGENS: Primary | ICD-10-CM

## 2022-03-03 DIAGNOSIS — J30.1 ALLERGY TO TREES: ICD-10-CM

## 2022-03-03 PROCEDURE — 95117 IMMUNOTHERAPY INJECTIONS: CPT | Performed by: NURSE PRACTITIONER

## 2022-03-03 NOTE — PROGRESS NOTES
After consent obtained/verified, allergy injection given in back of R/L arm(s). VIAL COLOR OF ALL VIALS TODAY IS SILVER    ALLERGY INJECTION FROM VIAL A GIVEN right  UPPER ARM IN THE AMOUNT OF 0.15 ML    ALLERGY INJECTION FROM VIAL B GIVEN left lower ARM IN THE AMOUNT OF 0.15 ML    ALLERGY INJECTION FROM VIAL C GIVEN leftupper ARM IN THE AMOUNT OF 0.15 ML      Documentation of vial injection specific to arm(s) noted on Allergy Immunotherapy Administration Form. Patient waited 30 minutes for observation. No          SHOT REACTION TREATMENT INSTRUCTIONS    During the 30 minute wait after an allergy injection the following symptoms should be reported:    Itching other than at the injection site  Hives or swelling other than at the injection site  Redness other than at the injection site  Difficulty breathing  Chest tightness  Difficulty swallowing  Throat tightness    If these symptoms occur, NOTIFY PROVIDER and the following treatment should be administered:    1. Epinephrine/Auvi Q 1:1000 IM - 0.3 ml if > 66 lbs or more, 0.15 ml if 33 - 63 lbs, or 0.1 ml if <33 lbs     2. Diphenhydramine - give all intramuscular:     2 to <6 years (off-label use): 6.25 mg,    6 to <12 years: 12.5 to 25 mg;    ?12 years: 25-50 mg.    3.  Famotidine:  Adults 40 mg oral    Adolescents age 12 years and >88 lbs: 40 mg    Children and Adolescents ? 12years of age: Initial: 0.25 mg/kg/dose  every 12 hours (maximum daily dose: 40 mg/day)    Epi/Auvi Q dose may me repeated in 5-15 minutes if adequate resolution of symptoms does not occur    Patient should be observed for at least one hour after final Epi/Auvi Q dose and must be seen by provider. Patients cannot drive themselves if they have received diphenhydramine.

## 2022-03-07 ENCOUNTER — NURSE ONLY (OUTPATIENT)
Dept: ALLERGY | Age: 47
End: 2022-03-07
Payer: COMMERCIAL

## 2022-03-07 VITALS
RESPIRATION RATE: 16 BRPM | OXYGEN SATURATION: 98 % | DIASTOLIC BLOOD PRESSURE: 64 MMHG | TEMPERATURE: 97.2 F | HEART RATE: 79 BPM | SYSTOLIC BLOOD PRESSURE: 118 MMHG

## 2022-03-07 DIAGNOSIS — Z91.09 MITE ALLERGY: ICD-10-CM

## 2022-03-07 DIAGNOSIS — J30.1 ALLERGY TO TREES: ICD-10-CM

## 2022-03-07 DIAGNOSIS — Z51.6 ENCOUNTER FOR DESENSITIZATION TO ALLERGENS: Primary | ICD-10-CM

## 2022-03-07 LAB
FUNGUS IDENTIFIED: NORMAL
FUNGUS SMEAR: NORMAL

## 2022-03-07 PROCEDURE — 95117 IMMUNOTHERAPY INJECTIONS: CPT | Performed by: NURSE PRACTITIONER

## 2022-03-07 NOTE — PROGRESS NOTES
After consent obtained/verified, allergy injection given in back of R/L arm(s). VIAL COLOR OF ALL VIALS TODAY IS silver    ALLERGY INJECTION FROM VIAL A GIVEN left  UPPER ARM IN THE AMOUNT OF 0.20 ML    ALLERGY INJECTION FROM VIAL B GIVEN right lower ARM IN THE AMOUNT OF 0.20 ML    ALLERGY INJECTION FROM VIAL C GIVEN rightupper ARM IN THE AMOUNT OF 0.20 ML      Documentation of vial injection specific to arm(s) noted on Allergy Immunotherapy Administration Form. Patient waited 30 minutes for observation. No      Patient tolerated well without adverse reaction WHILE IN OFFICE    SHOT REACTION TREATMENT INSTRUCTIONS    During the 30 minute wait after an allergy injection the following symptoms should be reported:    Itching other than at the injection site  Hives or swelling other than at the injection site  Redness other than at the injection site  Difficulty breathing  Chest tightness  Difficulty swallowing  Throat tightness    If these symptoms occur, NOTIFY PROVIDER and the following treatment should be administered:    1. Epinephrine/Auvi Q 1:1000 IM - 0.3 ml if > 66 lbs or more, 0.15 ml if 33 - 63 lbs, or 0.1 ml if <33 lbs     2. Diphenhydramine - give all intramuscular:     2 to <6 years (off-label use): 6.25 mg,    6 to <12 years: 12.5 to 25 mg;    ?12 years: 25-50 mg.    3.  Famotidine:  Adults 40 mg oral    Adolescents age 12 years and >88 lbs: 40 mg    Children and Adolescents ? 12years of age: Initial: 0.25 mg/kg/dose  every 12 hours (maximum daily dose: 40 mg/day)    Epi/Auvi Q dose may me repeated in 5-15 minutes if adequate resolution of symptoms does not occur    Patient should be observed for at least one hour after final Epi/Auvi Q dose and must be seen by provider. Patients cannot drive themselves if they have received diphenhydramine.

## 2022-03-10 ENCOUNTER — NURSE ONLY (OUTPATIENT)
Dept: ALLERGY | Age: 47
End: 2022-03-10
Payer: COMMERCIAL

## 2022-03-10 VITALS
HEART RATE: 80 BPM | DIASTOLIC BLOOD PRESSURE: 78 MMHG | OXYGEN SATURATION: 97 % | SYSTOLIC BLOOD PRESSURE: 120 MMHG | TEMPERATURE: 97.1 F

## 2022-03-10 DIAGNOSIS — Z91.09 MITE ALLERGY: ICD-10-CM

## 2022-03-10 DIAGNOSIS — J30.1 ALLERGY TO TREES: ICD-10-CM

## 2022-03-10 DIAGNOSIS — Z51.6 ENCOUNTER FOR DESENSITIZATION TO ALLERGENS: Primary | ICD-10-CM

## 2022-03-10 PROCEDURE — 95117 IMMUNOTHERAPY INJECTIONS: CPT | Performed by: NURSE PRACTITIONER

## 2022-03-10 NOTE — PROGRESS NOTES
After consent obtained/verified, allergy injection given in back of R/L arm(s). VIAL COLOR OF ALL VIALS TODAY IS SILVER    ALLERGY INJECTION FROM VIAL A GIVEN right  UPPER ARM IN THE AMOUNT OF 0.25 ML    ALLERGY INJECTION FROM VIAL B GIVEN left upper ARM IN THE AMOUNT OF 0.25 ML    ALLERGY INJECTION FROM VIAL C GIVEN leftlower ARM IN THE AMOUNT OF 0.25 ML      Documentation of vial injection specific to arm(s) noted on Allergy Immunotherapy Administration Form. Patient waited 30 minutes for observation. No      Patient tolerated well without adverse reaction WHILE IN OFFICE    SHOT REACTION TREATMENT INSTRUCTIONS    During the 30 minute wait after an allergy injection the following symptoms should be reported:    Itching other than at the injection site  Hives or swelling other than at the injection site  Redness other than at the injection site  Difficulty breathing  Chest tightness  Difficulty swallowing  Throat tightness    If these symptoms occur, NOTIFY PROVIDER and the following treatment should be administered:    1. Epinephrine/Auvi Q 1:1000 IM - 0.3 ml if > 66 lbs or more, 0.15 ml if 33 - 63 lbs, or 0.1 ml if <33 lbs     2. Diphenhydramine - give all intramuscular:     2 to <6 years (off-label use): 6.25 mg,    6 to <12 years: 12.5 to 25 mg;    ?12 years: 25-50 mg.    3.  Famotidine:  Adults 40 mg oral    Adolescents age 12 years and >88 lbs: 40 mg    Children and Adolescents ? 12years of age: Initial: 0.25 mg/kg/dose  every 12 hours (maximum daily dose: 40 mg/day)    Epi/Auvi Q dose may me repeated in 5-15 minutes if adequate resolution of symptoms does not occur    Patient should be observed for at least one hour after final Epi/Auvi Q dose and must be seen by provider. Patients cannot drive themselves if they have received diphenhydramine.

## 2022-03-14 ENCOUNTER — NURSE ONLY (OUTPATIENT)
Dept: ALLERGY | Age: 47
End: 2022-03-14
Payer: COMMERCIAL

## 2022-03-14 VITALS
RESPIRATION RATE: 16 BRPM | DIASTOLIC BLOOD PRESSURE: 84 MMHG | SYSTOLIC BLOOD PRESSURE: 122 MMHG | OXYGEN SATURATION: 96 % | HEART RATE: 77 BPM | TEMPERATURE: 97.9 F

## 2022-03-14 DIAGNOSIS — Z51.6 ENCOUNTER FOR DESENSITIZATION TO ALLERGENS: ICD-10-CM

## 2022-03-14 DIAGNOSIS — J30.1 ALLERGY TO TREES: Primary | ICD-10-CM

## 2022-03-14 DIAGNOSIS — Z91.09 MITE ALLERGY: ICD-10-CM

## 2022-03-14 PROCEDURE — 95117 IMMUNOTHERAPY INJECTIONS: CPT | Performed by: NURSE PRACTITIONER

## 2022-03-14 NOTE — PROGRESS NOTES
After consent obtained/verified, allergy injection given in back of R/L arm(s). VIAL COLOR OF ALL VIALS TODAY IS Silver    ALLERGY INJECTION FROM VIAL A GIVEN left  UPPER ARM IN THE AMOUNT OF 0.30 ML    ALLERGY INJECTION FROM VIAL B GIVEN right upper ARM IN THE AMOUNT OF 0.30 ML    ALLERGY INJECTION FROM VIAL C GIVEN rightlower ARM IN THE AMOUNT OF 0.30 ML      Documentation of vial injection specific to arm(s) noted on Allergy Immunotherapy Administration Form. Patient waited 30 minutes for observation. No      Patient tolerated well without adverse reaction WHILE IN OFFICE    SHOT REACTION TREATMENT INSTRUCTIONS    During the 30 minute wait after an allergy injection the following symptoms should be reported:    Itching other than at the injection site  Hives or swelling other than at the injection site  Redness other than at the injection site  Difficulty breathing  Chest tightness  Difficulty swallowing  Throat tightness    If these symptoms occur, NOTIFY PROVIDER and the following treatment should be administered:    1. Epinephrine/Auvi Q 1:1000 IM - 0.3 ml if > 66 lbs or more, 0.15 ml if 33 - 63 lbs, or 0.1 ml if <33 lbs     2. Diphenhydramine - give all intramuscular:     2 to <6 years (off-label use): 6.25 mg,    6 to <12 years: 12.5 to 25 mg;    ?12 years: 25-50 mg.    3.  Famotidine:  Adults 40 mg oral    Adolescents age 12 years and >88 lbs: 40 mg    Children and Adolescents ? 12years of age: Initial: 0.25 mg/kg/dose  every 12 hours (maximum daily dose: 40 mg/day)    Epi/Auvi Q dose may me repeated in 5-15 minutes if adequate resolution of symptoms does not occur    Patient should be observed for at least one hour after final Epi/Auvi Q dose and must be seen by provider. Patients cannot drive themselves if they have received diphenhydramine.

## 2022-03-17 ENCOUNTER — NURSE ONLY (OUTPATIENT)
Dept: ALLERGY | Age: 47
End: 2022-03-17
Payer: COMMERCIAL

## 2022-03-17 VITALS
HEART RATE: 93 BPM | DIASTOLIC BLOOD PRESSURE: 72 MMHG | SYSTOLIC BLOOD PRESSURE: 110 MMHG | TEMPERATURE: 98.1 F | OXYGEN SATURATION: 98 % | RESPIRATION RATE: 18 BRPM

## 2022-03-17 DIAGNOSIS — Z51.6 ENCOUNTER FOR DESENSITIZATION TO ALLERGENS: ICD-10-CM

## 2022-03-17 DIAGNOSIS — Z91.09 MITE ALLERGY: ICD-10-CM

## 2022-03-17 DIAGNOSIS — J30.1 ALLERGY TO TREES: Primary | ICD-10-CM

## 2022-03-17 PROCEDURE — 95117 IMMUNOTHERAPY INJECTIONS: CPT | Performed by: NURSE PRACTITIONER

## 2022-03-17 NOTE — PROGRESS NOTES
After consent obtained/verified, allergy injection given in back of R/L arm(s). VIAL COLOR OF ALL VIALS TODAY IS silver    ALLERGY INJECTION FROM VIAL A GIVEN right  UPPER ARM IN THE AMOUNT OF 0.35 ML    ALLERGY INJECTION FROM VIAL B GIVEN left lower ARM IN THE AMOUNT OF 0.35 ML    ALLERGY INJECTION FROM VIAL C GIVEN leftupper ARM IN THE AMOUNT OF 0.35 ML      Documentation of vial injection specific to arm(s) noted on Allergy Immunotherapy Administration Form. Patient waited 30 minutes for observation. No      Patient tolerated well without adverse reaction WHILE IN OFFICE    SHOT REACTION TREATMENT INSTRUCTIONS    During the 30 minute wait after an allergy injection the following symptoms should be reported:    Itching other than at the injection site  Hives or swelling other than at the injection site  Redness other than at the injection site  Difficulty breathing  Chest tightness  Difficulty swallowing  Throat tightness    If these symptoms occur, NOTIFY PROVIDER and the following treatment should be administered:    1. Epinephrine/Auvi Q 1:1000 IM - 0.3 ml if > 66 lbs or more, 0.15 ml if 33 - 63 lbs, or 0.1 ml if <33 lbs     2. Diphenhydramine - give all intramuscular:     2 to <6 years (off-label use): 6.25 mg,    6 to <12 years: 12.5 to 25 mg;    ?12 years: 25-50 mg.    3.  Famotidine:  Adults 40 mg oral    Adolescents age 12 years and >88 lbs: 40 mg    Children and Adolescents ? 12years of age: Initial: 0.25 mg/kg/dose  every 12 hours (maximum daily dose: 40 mg/day)    Epi/Auvi Q dose may me repeated in 5-15 minutes if adequate resolution of symptoms does not occur    Patient should be observed for at least one hour after final Epi/Auvi Q dose and must be seen by provider. Patients cannot drive themselves if they have received diphenhydramine.

## 2022-03-21 ENCOUNTER — NURSE ONLY (OUTPATIENT)
Dept: ALLERGY | Age: 47
End: 2022-03-21
Payer: COMMERCIAL

## 2022-03-21 VITALS
RESPIRATION RATE: 18 BRPM | OXYGEN SATURATION: 98 % | TEMPERATURE: 98 F | SYSTOLIC BLOOD PRESSURE: 110 MMHG | DIASTOLIC BLOOD PRESSURE: 72 MMHG | HEART RATE: 93 BPM

## 2022-03-21 DIAGNOSIS — Z91.09 MITE ALLERGY: ICD-10-CM

## 2022-03-21 DIAGNOSIS — J30.1 ALLERGY TO TREES: Primary | ICD-10-CM

## 2022-03-21 DIAGNOSIS — Z51.6 ENCOUNTER FOR DESENSITIZATION TO ALLERGENS: ICD-10-CM

## 2022-03-21 PROCEDURE — 95117 IMMUNOTHERAPY INJECTIONS: CPT | Performed by: NURSE PRACTITIONER

## 2022-03-21 RX ORDER — MONTELUKAST SODIUM 10 MG/1
TABLET ORAL
Qty: 90 TABLET | Refills: 0 | OUTPATIENT
Start: 2022-03-21

## 2022-03-21 RX ORDER — MONTELUKAST SODIUM 10 MG/1
10 TABLET ORAL NIGHTLY
Qty: 90 TABLET | Refills: 1 | OUTPATIENT
Start: 2022-03-21

## 2022-03-21 RX ORDER — MONTELUKAST SODIUM 10 MG/1
10 TABLET ORAL NIGHTLY
Qty: 90 TABLET | Refills: 0 | Status: SHIPPED | OUTPATIENT
Start: 2022-03-21 | End: 2022-03-28 | Stop reason: SDUPTHER

## 2022-03-21 NOTE — TELEPHONE ENCOUNTER
Refills have been requested for the following medications:         montelukast (SINGULAIR) 10 MG tablet Fransico Macias, APRN - CNP]     [id number] [place], [address] [facility] [phone]

## 2022-03-21 NOTE — PROGRESS NOTES
After consent obtained/verified, allergy injection given in back of R/L arm(s). VIAL COLOR OF ALL VIALS TODAY IS silver    ALLERGY INJECTION FROM VIAL A GIVEN left  UPPER ARM IN THE AMOUNT OF 0.40 ML    ALLERGY INJECTION FROM VIAL B GIVEN right lower ARM IN THE AMOUNT OF 0.40 ML    ALLERGY INJECTION FROM VIAL C GIVEN rightupper ARM IN THE AMOUNT OF 0.40 ML      Documentation of vial injection specific to arm(s) noted on Allergy Immunotherapy Administration Form. Patient waited 30 minutes for observation. No      Patient tolerated well without adverse reaction WHILE IN OFFICE    SHOT REACTION TREATMENT INSTRUCTIONS    During the 30 minute wait after an allergy injection the following symptoms should be reported:    Itching other than at the injection site  Hives or swelling other than at the injection site  Redness other than at the injection site  Difficulty breathing  Chest tightness  Difficulty swallowing  Throat tightness    If these symptoms occur, NOTIFY PROVIDER and the following treatment should be administered:    1. Epinephrine/Auvi Q 1:1000 IM - 0.3 ml if > 66 lbs or more, 0.15 ml if 33 - 63 lbs, or 0.1 ml if <33 lbs     2. Diphenhydramine - give all intramuscular:     2 to <6 years (off-label use): 6.25 mg,    6 to <12 years: 12.5 to 25 mg;    ?12 years: 25-50 mg.    3.  Famotidine:  Adults 40 mg oral    Adolescents age 12 years and >88 lbs: 40 mg    Children and Adolescents ? 12years of age: Initial: 0.25 mg/kg/dose  every 12 hours (maximum daily dose: 40 mg/day)    Epi/Auvi Q dose may me repeated in 5-15 minutes if adequate resolution of symptoms does not occur    Patient should be observed for at least one hour after final Epi/Auvi Q dose and must be seen by provider. Patients cannot drive themselves if they have received diphenhydramine.

## 2022-03-24 ENCOUNTER — NURSE ONLY (OUTPATIENT)
Dept: ALLERGY | Age: 47
End: 2022-03-24
Payer: COMMERCIAL

## 2022-03-24 VITALS
SYSTOLIC BLOOD PRESSURE: 112 MMHG | TEMPERATURE: 97.9 F | RESPIRATION RATE: 18 BRPM | DIASTOLIC BLOOD PRESSURE: 78 MMHG | HEART RATE: 86 BPM | OXYGEN SATURATION: 98 %

## 2022-03-24 DIAGNOSIS — Z91.09 MITE ALLERGY: ICD-10-CM

## 2022-03-24 DIAGNOSIS — J30.1 ALLERGY TO TREES: Primary | ICD-10-CM

## 2022-03-24 DIAGNOSIS — Z51.6 ENCOUNTER FOR DESENSITIZATION TO ALLERGENS: ICD-10-CM

## 2022-03-24 PROCEDURE — 95117 IMMUNOTHERAPY INJECTIONS: CPT | Performed by: NURSE PRACTITIONER

## 2022-03-24 NOTE — PROGRESS NOTES
After consent obtained/verified, allergy injection given in back of R/L arm(s). VIAL COLOR OF ALL VIALS TODAY IS silver    ALLERGY INJECTION FROM VIAL A GIVEN right  UPPER ARM IN THE AMOUNT OF 0.45 ML    ALLERGY INJECTION FROM VIAL B GIVEN left upper ARM IN THE AMOUNT OF 0.45 ML    ALLERGY INJECTION FROM VIAL C GIVEN leftlower ARM IN THE AMOUNT OF 0.45 ML      Documentation of vial injection specific to arm(s) noted on Allergy Immunotherapy Administration Form. Patient waited 30 minutes for observation. No      Patient tolerated well without adverse reaction WHILE IN OFFICE    SHOT REACTION TREATMENT INSTRUCTIONS    During the 30 minute wait after an allergy injection the following symptoms should be reported:    Itching other than at the injection site  Hives or swelling other than at the injection site  Redness other than at the injection site  Difficulty breathing  Chest tightness  Difficulty swallowing  Throat tightness    If these symptoms occur, NOTIFY PROVIDER and the following treatment should be administered:    1. Epinephrine/Auvi Q 1:1000 IM - 0.3 ml if > 66 lbs or more, 0.15 ml if 33 - 63 lbs, or 0.1 ml if <33 lbs     2. Diphenhydramine - give all intramuscular:     2 to <6 years (off-label use): 6.25 mg,    6 to <12 years: 12.5 to 25 mg;    ?12 years: 25-50 mg.    3.  Famotidine:  Adults 40 mg oral    Adolescents age 12 years and >88 lbs: 40 mg    Children and Adolescents ? 12years of age: Initial: 0.25 mg/kg/dose  every 12 hours (maximum daily dose: 40 mg/day)    Epi/Auvi Q dose may me repeated in 5-15 minutes if adequate resolution of symptoms does not occur    Patient should be observed for at least one hour after final Epi/Auvi Q dose and must be seen by provider. Patients cannot drive themselves if they have received diphenhydramine.

## 2022-03-28 ENCOUNTER — NURSE ONLY (OUTPATIENT)
Dept: ALLERGY | Age: 47
End: 2022-03-28
Payer: COMMERCIAL

## 2022-03-28 ENCOUNTER — OFFICE VISIT (OUTPATIENT)
Dept: ALLERGY | Age: 47
End: 2022-03-28
Payer: COMMERCIAL

## 2022-03-28 VITALS
TEMPERATURE: 97.1 F | DIASTOLIC BLOOD PRESSURE: 68 MMHG | SYSTOLIC BLOOD PRESSURE: 110 MMHG | RESPIRATION RATE: 17 BRPM | WEIGHT: 187 LBS | HEIGHT: 63 IN | HEART RATE: 83 BPM | BODY MASS INDEX: 33.13 KG/M2 | OXYGEN SATURATION: 98 %

## 2022-03-28 VITALS
TEMPERATURE: 97.1 F | HEART RATE: 83 BPM | SYSTOLIC BLOOD PRESSURE: 110 MMHG | RESPIRATION RATE: 17 BRPM | DIASTOLIC BLOOD PRESSURE: 68 MMHG | OXYGEN SATURATION: 99 %

## 2022-03-28 DIAGNOSIS — J30.1 ALLERGY TO TREES: ICD-10-CM

## 2022-03-28 DIAGNOSIS — Z51.6 ENCOUNTER FOR DESENSITIZATION TO ALLERGENS: ICD-10-CM

## 2022-03-28 DIAGNOSIS — Z91.048 ALLERGY TO MOLD: ICD-10-CM

## 2022-03-28 DIAGNOSIS — J30.81 ANIMAL DANDER ALLERGY: ICD-10-CM

## 2022-03-28 DIAGNOSIS — Z91.09 MITE ALLERGY: ICD-10-CM

## 2022-03-28 DIAGNOSIS — J30.9 ALLERGIC CONJUNCTIVITIS OF BOTH EYES AND RHINITIS: Primary | ICD-10-CM

## 2022-03-28 DIAGNOSIS — J33.0 POLYP OF NASAL CAVITY: ICD-10-CM

## 2022-03-28 DIAGNOSIS — J30.1 ACUTE SEASONAL ALLERGIC RHINITIS DUE TO POLLEN: ICD-10-CM

## 2022-03-28 DIAGNOSIS — H10.10 ALLERGIC RHINOCONJUNCTIVITIS: ICD-10-CM

## 2022-03-28 DIAGNOSIS — H10.13 ALLERGIC CONJUNCTIVITIS OF BOTH EYES AND RHINITIS: Primary | ICD-10-CM

## 2022-03-28 DIAGNOSIS — J30.9 CHRONIC ALLERGIC RHINITIS: ICD-10-CM

## 2022-03-28 DIAGNOSIS — J33.9 POLYP, NASAL: ICD-10-CM

## 2022-03-28 DIAGNOSIS — J30.9 ALLERGIC RHINOCONJUNCTIVITIS: ICD-10-CM

## 2022-03-28 DIAGNOSIS — Z91.048 ALLERGY TO MOLD: Primary | ICD-10-CM

## 2022-03-28 PROCEDURE — 95117 IMMUNOTHERAPY INJECTIONS: CPT | Performed by: NURSE PRACTITIONER

## 2022-03-28 PROCEDURE — 99213 OFFICE O/P EST LOW 20 MIN: CPT | Performed by: NURSE PRACTITIONER

## 2022-03-28 RX ORDER — KETOTIFEN FUMARATE 0.35 MG/ML
SOLUTION/ DROPS OPHTHALMIC
Qty: 1 EACH | Refills: 2 | Status: SHIPPED | OUTPATIENT
Start: 2022-03-28

## 2022-03-28 RX ORDER — MONTELUKAST SODIUM 10 MG/1
10 TABLET ORAL NIGHTLY
Qty: 90 TABLET | Refills: 0 | Status: SHIPPED | OUTPATIENT
Start: 2022-03-28 | End: 2022-06-14 | Stop reason: SDUPTHER

## 2022-03-28 ASSESSMENT — ENCOUNTER SYMPTOMS
SINUS PRESSURE: 0
VOMITING: 0
RHINORRHEA: 0
SORE THROAT: 0
DIARRHEA: 0
FACIAL SWELLING: 0
TROUBLE SWALLOWING: 0
WHEEZING: 0
CHOKING: 0
APNEA: 0
EYE ITCHING: 1
ABDOMINAL PAIN: 0
VOICE CHANGE: 0
COUGH: 0
CHEST TIGHTNESS: 0
NAUSEA: 0
STRIDOR: 0
SHORTNESS OF BREATH: 0
COLOR CHANGE: 0

## 2022-03-28 NOTE — PROGRESS NOTES
[email or web address]    Allergy & Asthma   [address] [facility], [address]  Ph:   [phone]  Fax:[phone]    Provider:  Dr. Mariposa Conde:   Chief Complaint   Patient presents with    Follow-up     allergy management and dupixent review           HISTORY OF PRESENT ILLNESS: ESTABLISHED PATIENT HERE FOR EVALUATION    51year old female with history of nasal polyposis. Had surgery on April 30 2021 where she had nasal polyps removed by Dr. Maria A Cortez. Patient states that she has had allergies since she was a young child. She complains now of chronic nasal stuffiness. She states that it happens all year. It typically becomes worse whenever she is around animals including cats. She states is severe in severity. She states that although she had surgery in April she still has a lot of nasal congestion and feels like she has a lot of nasal sinus pressure. She has taken Flonase for several years but states that it does not seem to be very beneficial although she uses it every day. She has also tried Claritin 10 mg daily from 09/01/2020 to 10/31/2020 which was not effective, zyrtec 10 mg daily 11/01/2020 to present, chlorpheniramine 2 mg for last 12 month every 4 hours as needed, meclizine 25 mg every 4 hours as needed, benadryl every 4 hours as needed, and allegra 180 mg every day from 06/01/2020 to 08/30/2020. Patient has not found any antihistamine to be effective in controlling her nasal congestion. She also reports a reduced sense of smell intermittently. Patient denies any nausea, vomiting, fever. She does complain of frequent sinus infections. She states in the last year she had 5 sinus infections and was treated with antibiotics. She states that her eyes itch, burn anterior. She complains of itchy ears. At times they become painful. She sneezes a lot and has itchy nose that runs and is stuffy. She is a mouth breather and snores.   She has obstructive sleep apnea. She complains of yellow-green drainage from her nose. She states her throat itches and she has a lot of postnasal drip. There are times that she does have chest tightness from the phlegm. She also complains of headaches. She returns today to review labs and for allergy testing. Patient was also ordered that she has done very well on the medication. Patient recently had scope by ENT and no return of nasal polyps were found. Review of Systems:  Review of Systems   Constitutional: Negative for activity change, appetite change, chills, diaphoresis, fatigue, fever and unexpected weight change. HENT: Positive for congestion and postnasal drip. Negative for dental problem, ear discharge, ear pain, facial swelling, hearing loss, mouth sores, nosebleeds, rhinorrhea, sinus pressure, sneezing, sore throat, tinnitus, trouble swallowing and voice change. Eyes: Positive for itching. Negative for visual disturbance. Respiratory: Negative for apnea, cough, choking, chest tightness, shortness of breath, wheezing and stridor. Cardiovascular: Negative for chest pain, palpitations and leg swelling. Gastrointestinal: Negative for abdominal pain, diarrhea, nausea and vomiting. Endocrine: Negative for cold intolerance, heat intolerance, polydipsia and polyuria. Genitourinary: Negative for difficulty urinating, dysuria, enuresis, hematuria and urgency. Musculoskeletal: Negative for arthralgias, gait problem, neck pain and neck stiffness. Skin: Negative for color change and rash. Allergic/Immunologic: Positive for environmental allergies and food allergies. Negative for immunocompromised state. Neurological: Negative for dizziness, syncope, facial asymmetry, speech difficulty, light-headedness and headaches. Hematological: Negative for adenopathy. Does not bruise/bleed easily. Psychiatric/Behavioral: Negative for confusion and sleep disturbance.  The patient is not nervous/anxious. All other systems reviewed and are negative.         Past MedicalHistory:    Past Medical History:   Diagnosis Date    Asthma     Panic attacks 4/98    PONV (postoperative nausea and vomiting)     Scopalamine patch    Seasonal allergies 4/99    Sinusitis, chronic        Past Surgical History:  Past Surgical History:   Procedure Laterality Date    CYST REMOVAL      INTRAUTERINE DEVICE INSERTION      murena-  Out 2014    SINUS ENDOSCOPY  04/2021    dr Kim Paez Bilateral 04/30/2021    SINUS ENDOSCOPY FUNCTIONAL SURGERY IMAGE GUIDED, RIGHT MAXILLARY ANTROSTOMY WITH REMOVAL OF TISSUE FROM MAXILLARY SINUS, RIGHT PARTIAL ANTERIOR ETHMOIDECTOMY, NASAL ENDOSCOPY OF LEFT ABBEY BULLOSA performed by Svetlana Berumen MD at [address] [place] [address] Bilateral 2/4/2022    IMAGE GUIDED ENDOSCOPY SURGERY BILATERAL ETHMOIDECTOMY(OSSEOUS RIGHT ETHMOID AIR CELLS OPACIFIED LEFT ETHMOID AIR CELL) RIGHT NASAL ANTRAL WINDOW performed by Svetlana Berumen MD at [facility]  02/04/2022    dr Gilmar Curiel  07/08/2014    Dr Mandeep Levine; Removal IUD       Family History:   Family History   Problem Relation Age of Onset    Breast Cancer Mother 52    Breast Cancer Maternal Grandmother 48       Social History:   Social History     Tobacco Use    Smoking status: Never Smoker    Smokeless tobacco: Never Used   Substance Use Topics    Alcohol use: Yes     Comment: occasinally        Allergies:  Seasonal    CurrentMedications:     Current Outpatient Medications:     ketotifen (ZADITOR) 0.025 % ophthalmic solution, One drop in each eye twice daily as needed, Disp: 1 each, Rfl: 2    montelukast (SINGULAIR) 10 MG tablet, Take 1 tablet by mouth nightly, Disp: 90 tablet, Rfl: 0    ALLERGEN EXTRACT, Twice a Week , Disp: , Rfl:     DUPIXENT 300 MG/2ML SOPN injection, , Disp: , Rfl:     meclizine (ANTIVERT) 25 MG tablet, Take 1 tablet by mouth 4 times daily as needed for Dizziness, Disp: 40 tablet, Rfl: 1    ZINC PO, Take by mouth , Disp: , Rfl:     Fluticasone Propionate (XHANCE) 93 MCG/ACT EXHU, 1 spray by Nasal route 2 times daily, Disp: 6.3 mL, Rfl: 11    dupilumab (DUPIXENT) 300 MG/2ML SOSY injection, Inject 2 mLs into the skin every 14 days, Disp: 2 Syringe, Rfl: 11    chlorpheniramine (CHLOR-TRIMETON) 2 MG/5ML syrup, Take 2 mg by mouth every 4 hours as needed for Allergies , Disp: , Rfl:     EPINEPHrine (AUVI-Q) 0.3 MG/0.3ML SOAJ injection, Dispense 2 packs of 2 (total 4 devices). Use as directed, STAT for allergic reaction. , Disp: 2 each, Rfl: 0    Multiple Vitamin (MULTI VITAMIN DAILY PO), Take by mouth , Disp: , Rfl:     Ascorbic Acid (VITAMIN C PO), Take by mouth , Disp: , Rfl:     VITAMIN D PO, Take by mouth , Disp: , Rfl:     Probiotic Product (PROBIOTIC PO), Take by mouth 1 tab in morning, Disp: , Rfl:     b complex vitamins capsule, Take 1 capsule by mouth daily , Disp: , Rfl:     cetirizine (ZYRTEC) 10 MG tablet, Take 10 mg by mouth nightly Not in last 10 days, Disp: , Rfl:     Pseudoephedrine HCl (SUDAFED 12 HOUR PO), Take 1 tablet by mouth daily , Disp: , Rfl:       Physical Exam:      Vitals:    Vitals:    03/28/22 0919   BP: 110/68   Pulse: 83   Resp: 17   Temp: 97.1 °F (36.2 °C)   SpO2: 98%       187 lb (84.8 kg)       Temp: 97.1 °F (36.2 °C) I @FLOWSTAT(6)@ IPulse: 83 I @FLOWSTAT(8)@ I BP: 110/68 I @YUAN(21)@; @JAMESM(67)@ I Resp: 17 I @FLOWSTAT(9)@ I SpO2: 98 % I @FLOWSTAT(10)@ I   I Height: 5' 3\" (160 cm) I   I Facility age limit for growth percentiles is 20 years. I     Facility age limit for growth percentiles is 20 years. Facility age limit for growth percentiles is 20 years. Facility age limit for growth percentiles is 20 years. Facility age limit for growth percentiles is 20 years. Physical Exam:    Physical Exam  Vitals and nursing note reviewed. Constitutional:       Appearance: Normal appearance. She is normal weight. HENT:      Head: Normocephalic and atraumatic. Right Ear: Ear canal and external ear normal.      Left Ear: Ear canal and external ear normal.      Nose: Nose normal.      Mouth/Throat:      Mouth: Mucous membranes are moist.      Pharynx: Oropharynx is clear. Eyes:      Extraocular Movements: Extraocular movements intact. Conjunctiva/sclera: Conjunctivae normal.      Pupils: Pupils are equal, round, and reactive to light. Cardiovascular:      Rate and Rhythm: Normal rate and regular rhythm. Pulses: Normal pulses. Heart sounds: Normal heart sounds. Pulmonary:      Effort: Pulmonary effort is normal.      Breath sounds: Normal breath sounds. Musculoskeletal:         General: Normal range of motion. Cervical back: Normal range of motion and neck supple. Skin:     General: Skin is warm and dry. Capillary Refill: Capillary refill takes less than 2 seconds. Neurological:      General: No focal deficit present. Mental Status: She is alert and oriented to person, place, and time. Mental status is at baseline. Psychiatric:         Mood and Affect: Mood normal.         Behavior: Behavior normal.         Thought Content: Thought content normal.         Judgment: Judgment normal.             DATA:  Lab Review:    CBC:   Lab Results   Component Value Date    WBC 5.8 2021    RBC 4.79 2021    HGB 14.4 2021    HCT 42.5 2021    MCV 89 2021    MCH 30.1 2021    MCHC 33.9 2021     2021          No results found for: IGE   No results found for: IGG  No results found for: IGA   No results found for: IGM    No results found for: DEIRDRE   No results found for: RF       No results found for this or any previous visit. No results found for this or any previous visit. All current and previous medial labs have been reviewed and discussed with patient         Procedures:   Allergy Testin2021    Assessment/Orders: Diagnosis Orders   1. Allergic conjunctivitis of both eyes and rhinitis  ketotifen (ZADITOR) 0.025 % ophthalmic solution   2. Polyp, nasal     3. Animal dander allergy     4. Polyp of nasal cavity     5. Allergic rhinoconjunctivitis     6. Allergy to mold     7. Chronic allergic rhinitis     8. Acute seasonal allergic rhinitis due to pollen         All diagnostic imaging  have been personally reviewed     Plan:  Follow Up:6 months    Continue allergy injections. Reminded patient she would not see relief until blue vial  Prescribed allergy eyedrops  Continue Dupixent patient has had no nasal polyp returns when she was scoped by ENT and medication is effective. She denies any problems    Spent 20 minutes of face-to-face time with the patient with well more than half of the visit being dedicated to the discussion of the various symptom problems, provided education of medications and disease process, as well as discussion of a therapeutic plan for each. Face-to-face education time does not include any time that may have been spent for procedures.     (Please note that portions of this note may have been completed with a voice recognition program.  Efforts were made to edit the dictation but occasionally words are mis-transcribed.)         Signed:  RICH Dewitt CNP  3/28/2022  9:51 AM

## 2022-03-28 NOTE — PROGRESS NOTES
After consent obtained/verified, allergy injection given in back of R/L arm(s). VIAL COLOR OF ALL VIALS TODAY IS Silver    ALLERGY INJECTION FROM VIAL A GIVEN left  UPPER ARM IN THE AMOUNT OF 0.50 ML    ALLERGY INJECTION FROM VIAL B GIVEN right upper ARM IN THE AMOUNT OF 0.50 ML    ALLERGY INJECTION FROM VIAL C GIVEN rightlower ARM IN THE AMOUNT OF 0.50 ML      Documentation of vial injection specific to arm(s) noted on Allergy Immunotherapy Administration Form. Patient waited 30 minutes for observation. No      Patient tolerated well without adverse reaction WHILE IN OFFICE    SHOT REACTION TREATMENT INSTRUCTIONS    During the 30 minute wait after an allergy injection the following symptoms should be reported:    Itching other than at the injection site  Hives or swelling other than at the injection site  Redness other than at the injection site  Difficulty breathing  Chest tightness  Difficulty swallowing  Throat tightness    If these symptoms occur, NOTIFY PROVIDER and the following treatment should be administered:    1. Epinephrine/Auvi Q 1:1000 IM - 0.3 ml if > 66 lbs or more, 0.15 ml if 33 - 63 lbs, or 0.1 ml if <33 lbs     2. Diphenhydramine - give all intramuscular:     2 to <6 years (off-label use): 6.25 mg,    6 to <12 years: 12.5 to 25 mg;    ?12 years: 25-50 mg.    3.  Famotidine:  Adults 40 mg oral    Adolescents age 12 years and >88 lbs: 40 mg    Children and Adolescents ? 12years of age: Initial: 0.25 mg/kg/dose  every 12 hours (maximum daily dose: 40 mg/day)    Epi/Auvi Q dose may me repeated in 5-15 minutes if adequate resolution of symptoms does not occur    Patient should be observed for at least one hour after final Epi/Auvi Q dose and must be seen by provider. Patients cannot drive themselves if they have received diphenhydramine.

## 2022-03-31 ENCOUNTER — NURSE ONLY (OUTPATIENT)
Dept: ALLERGY | Age: 47
End: 2022-03-31
Payer: COMMERCIAL

## 2022-03-31 VITALS
OXYGEN SATURATION: 98 % | HEART RATE: 100 BPM | RESPIRATION RATE: 16 BRPM | TEMPERATURE: 98.6 F | DIASTOLIC BLOOD PRESSURE: 78 MMHG | SYSTOLIC BLOOD PRESSURE: 128 MMHG

## 2022-03-31 DIAGNOSIS — J30.1 ALLERGY TO TREES: ICD-10-CM

## 2022-03-31 DIAGNOSIS — Z51.6 ENCOUNTER FOR DESENSITIZATION TO ALLERGENS: ICD-10-CM

## 2022-03-31 DIAGNOSIS — Z91.048 ALLERGY TO MOLD: Primary | ICD-10-CM

## 2022-03-31 PROCEDURE — 95117 IMMUNOTHERAPY INJECTIONS: CPT | Performed by: NURSE PRACTITIONER

## 2022-03-31 NOTE — PROGRESS NOTES
After consent obtained/verified, allergy injection given in back of R/L arm(s). VIAL COLOR OF ALL VIALS TODAY IS green    ALLERGY INJECTION FROM VIAL A GIVEN right  UPPER ARM IN THE AMOUNT OF 0.05 ML    ALLERGY INJECTION FROM VIAL B GIVEN left lower ARM IN THE AMOUNT OF 0.05 ML    ALLERGY INJECTION FROM VIAL C GIVEN leftupper ARM IN THE AMOUNT OF 0.05 ML      Documentation of vial injection specific to arm(s) noted on Allergy Immunotherapy Administration Form. Patient waited 30 minutes for observation. No      Patient tolerated well without adverse reaction WHILE IN OFFICE    SHOT REACTION TREATMENT INSTRUCTIONS    During the 30 minute wait after an allergy injection the following symptoms should be reported:    Itching other than at the injection site  Hives or swelling other than at the injection site  Redness other than at the injection site  Difficulty breathing  Chest tightness  Difficulty swallowing  Throat tightness    If these symptoms occur, NOTIFY PROVIDER and the following treatment should be administered:    1. Epinephrine/Auvi Q 1:1000 IM - 0.3 ml if > 66 lbs or more, 0.15 ml if 33 - 63 lbs, or 0.1 ml if <33 lbs     2. Diphenhydramine - give all intramuscular:     2 to <6 years (off-label use): 6.25 mg,    6 to <12 years: 12.5 to 25 mg;    ?12 years: 25-50 mg.    3.  Famotidine:  Adults 40 mg oral    Adolescents age 12 years and >88 lbs: 40 mg    Children and Adolescents ? 12years of age: Initial: 0.25 mg/kg/dose  every 12 hours (maximum daily dose: 40 mg/day)    Epi/Auvi Q dose may me repeated in 5-15 minutes if adequate resolution of symptoms does not occur    Patient should be observed for at least one hour after final Epi/Auvi Q dose and must be seen by provider. Patients cannot drive themselves if they have received diphenhydramine.

## 2022-04-04 ENCOUNTER — NURSE ONLY (OUTPATIENT)
Dept: ALLERGY | Age: 47
End: 2022-04-04
Payer: COMMERCIAL

## 2022-04-04 VITALS
DIASTOLIC BLOOD PRESSURE: 70 MMHG | SYSTOLIC BLOOD PRESSURE: 114 MMHG | OXYGEN SATURATION: 97 % | HEART RATE: 79 BPM | RESPIRATION RATE: 14 BRPM | TEMPERATURE: 97.5 F

## 2022-04-04 DIAGNOSIS — Z51.6 ENCOUNTER FOR DESENSITIZATION TO ALLERGENS: Primary | ICD-10-CM

## 2022-04-04 DIAGNOSIS — J30.1 ALLERGY TO TREES: ICD-10-CM

## 2022-04-04 PROCEDURE — 95117 IMMUNOTHERAPY INJECTIONS: CPT | Performed by: NURSE PRACTITIONER

## 2022-04-04 NOTE — PROGRESS NOTES
After consent obtained/verified, allergy injection given in back of R/L arm(s). VIAL COLOR OF ALL VIALS TODAY IS GREEN    ALLERGY INJECTION FROM VIAL A GIVEN left  UPPER ARM IN THE AMOUNT OF 0.10 ML    ALLERGY INJECTION FROM VIAL B GIVEN right lower ARM IN THE AMOUNT OF 0.10 ML    ALLERGY INJECTION FROM VIAL C GIVEN right upper ARM IN THE AMOUNT OF 0.10 ML      Documentation of vial injection specific to arm(s) noted on Allergy Immunotherapy Administration Form. Patient waited 30 minutes for observation. No          SHOT REACTION TREATMENT INSTRUCTIONS    During the 30 minute wait after an allergy injection the following symptoms should be reported:    Itching other than at the injection site  Hives or swelling other than at the injection site  Redness other than at the injection site  Difficulty breathing  Chest tightness  Difficulty swallowing  Throat tightness    If these symptoms occur, NOTIFY PROVIDER and the following treatment should be administered:    1. Epinephrine/Auvi Q 1:1000 IM - 0.3 ml if > 66 lbs or more, 0.15 ml if 33 - 63 lbs, or 0.1 ml if <33 lbs     2. Diphenhydramine - give all intramuscular:     2 to <6 years (off-label use): 6.25 mg,    6 to <12 years: 12.5 to 25 mg;    ?12 years: 25-50 mg.    3.  Famotidine:  Adults 40 mg oral    Adolescents age 12 years and >88 lbs: 40 mg    Children and Adolescents ? 12years of age: Initial: 0.25 mg/kg/dose  every 12 hours (maximum daily dose: 40 mg/day)    Epi/Auvi Q dose may me repeated in 5-15 minutes if adequate resolution of symptoms does not occur    Patient should be observed for at least one hour after final Epi/Auvi Q dose and must be seen by provider. Patients cannot drive themselves if they have received diphenhydramine.

## 2022-04-07 ENCOUNTER — NURSE ONLY (OUTPATIENT)
Dept: ALLERGY | Age: 47
End: 2022-04-07
Payer: COMMERCIAL

## 2022-04-07 VITALS
OXYGEN SATURATION: 97 % | TEMPERATURE: 98.3 F | DIASTOLIC BLOOD PRESSURE: 70 MMHG | HEART RATE: 90 BPM | RESPIRATION RATE: 14 BRPM | SYSTOLIC BLOOD PRESSURE: 128 MMHG

## 2022-04-07 DIAGNOSIS — Z51.6 ENCOUNTER FOR DESENSITIZATION TO ALLERGENS: Primary | ICD-10-CM

## 2022-04-07 DIAGNOSIS — J30.1 ALLERGY TO TREES: ICD-10-CM

## 2022-04-07 PROCEDURE — 95117 IMMUNOTHERAPY INJECTIONS: CPT | Performed by: NURSE PRACTITIONER

## 2022-04-07 NOTE — PROGRESS NOTES
After consent obtained/verified, allergy injection given in back of R/L arm(s). VIAL COLOR OF ALL VIALS TODAY IS GREEN    ALLERGY INJECTION FROM VIAL A GIVEN right  UPPER ARM IN THE AMOUNT OF 0.15 ML    ALLERGY INJECTION FROM VIAL B GIVEN left upper ARM IN THE AMOUNT OF 0.15 ML    ALLERGY INJECTION FROM VIAL C GIVEN left lower ARM IN THE AMOUNT OF 0.15 ML      Documentation of vial injection specific to arm(s) noted on Allergy Immunotherapy Administration Form. Patient waited 30 minutes for observation. No          SHOT REACTION TREATMENT INSTRUCTIONS    During the 30 minute wait after an allergy injection the following symptoms should be reported:    Itching other than at the injection site  Hives or swelling other than at the injection site  Redness other than at the injection site  Difficulty breathing  Chest tightness  Difficulty swallowing  Throat tightness    If these symptoms occur, NOTIFY PROVIDER and the following treatment should be administered:    1. Epinephrine/Auvi Q 1:1000 IM - 0.3 ml if > 66 lbs or more, 0.15 ml if 33 - 63 lbs, or 0.1 ml if <33 lbs     2. Diphenhydramine - give all intramuscular:     2 to <6 years (off-label use): 6.25 mg,    6 to <12 years: 12.5 to 25 mg;    ?12 years: 25-50 mg.    3.  Famotidine:  Adults 40 mg oral    Adolescents age 12 years and >88 lbs: 40 mg    Children and Adolescents ? 12years of age: Initial: 0.25 mg/kg/dose  every 12 hours (maximum daily dose: 40 mg/day)    Epi/Auvi Q dose may me repeated in 5-15 minutes if adequate resolution of symptoms does not occur    Patient should be observed for at least one hour after final Epi/Auvi Q dose and must be seen by provider. Patients cannot drive themselves if they have received diphenhydramine.

## 2022-04-18 ENCOUNTER — NURSE ONLY (OUTPATIENT)
Dept: ALLERGY | Age: 47
End: 2022-04-18
Payer: COMMERCIAL

## 2022-04-18 VITALS
SYSTOLIC BLOOD PRESSURE: 118 MMHG | TEMPERATURE: 98.2 F | HEART RATE: 85 BPM | DIASTOLIC BLOOD PRESSURE: 80 MMHG | OXYGEN SATURATION: 97 % | RESPIRATION RATE: 16 BRPM

## 2022-04-18 DIAGNOSIS — Z91.048 ALLERGY TO MOLD: ICD-10-CM

## 2022-04-18 DIAGNOSIS — Z51.6 ENCOUNTER FOR DESENSITIZATION TO ALLERGENS: ICD-10-CM

## 2022-04-18 DIAGNOSIS — J30.1 ALLERGY TO TREES: Primary | ICD-10-CM

## 2022-04-18 PROCEDURE — 95117 IMMUNOTHERAPY INJECTIONS: CPT | Performed by: NURSE PRACTITIONER

## 2022-04-18 NOTE — PROGRESS NOTES
After consent obtained/verified, allergy injection given in back of R/L arm(s). VIAL COLOR OF ALL VIALS TODAY IS GREEN    ALLERGY INJECTION FROM VIAL A GIVEN left  UPPER ARM IN THE AMOUNT OF 0.15 ML    ALLERGY INJECTION FROM VIAL B GIVEN right upper ARM IN THE AMOUNT OF 0.15 ML    ALLERGY INJECTION FROM VIAL C GIVEN rightlower ARM IN THE AMOUNT OF 0.15 ML      Documentation of vial injection specific to arm(s) noted on Allergy Immunotherapy Administration Form. Patient waited 30 minutes for observation. No      Patient tolerated well without adverse reaction WHILE IN OFFICE    SHOT REACTION TREATMENT INSTRUCTIONS    During the 30 minute wait after an allergy injection the following symptoms should be reported:    Itching other than at the injection site  Hives or swelling other than at the injection site  Redness other than at the injection site  Difficulty breathing  Chest tightness  Difficulty swallowing  Throat tightness    If these symptoms occur, NOTIFY PROVIDER and the following treatment should be administered:    1. Epinephrine/Auvi Q 1:1000 IM - 0.3 ml if > 66 lbs or more, 0.15 ml if 33 - 63 lbs, or 0.1 ml if <33 lbs     2. Diphenhydramine - give all intramuscular:     2 to <6 years (off-label use): 6.25 mg,    6 to <12 years: 12.5 to 25 mg;    ?12 years: 25-50 mg.    3.  Famotidine:  Adults 40 mg oral    Adolescents age 12 years and >88 lbs: 40 mg    Children and Adolescents ? 12years of age: Initial: 0.25 mg/kg/dose  every 12 hours (maximum daily dose: 40 mg/day)    Epi/Auvi Q dose may me repeated in 5-15 minutes if adequate resolution of symptoms does not occur    Patient should be observed for at least one hour after final Epi/Auvi Q dose and must be seen by provider. Patients cannot drive themselves if they have received diphenhydramine.

## 2022-04-21 ENCOUNTER — NURSE ONLY (OUTPATIENT)
Dept: ALLERGY | Age: 47
End: 2022-04-21
Payer: COMMERCIAL

## 2022-04-21 VITALS
RESPIRATION RATE: 28 BRPM | TEMPERATURE: 98.3 F | HEART RATE: 68 BPM | DIASTOLIC BLOOD PRESSURE: 80 MMHG | SYSTOLIC BLOOD PRESSURE: 110 MMHG

## 2022-04-21 DIAGNOSIS — Z51.6 ENCOUNTER FOR DESENSITIZATION TO ALLERGENS: Primary | ICD-10-CM

## 2022-04-21 DIAGNOSIS — H10.10 ALLERGIC RHINOCONJUNCTIVITIS: ICD-10-CM

## 2022-04-21 DIAGNOSIS — H10.13 ALLERGIC CONJUNCTIVITIS OF BOTH EYES AND RHINITIS: ICD-10-CM

## 2022-04-21 DIAGNOSIS — J30.9 ALLERGIC RHINOCONJUNCTIVITIS: ICD-10-CM

## 2022-04-21 DIAGNOSIS — J30.81 ANIMAL DANDER ALLERGY: ICD-10-CM

## 2022-04-21 DIAGNOSIS — J30.9 ALLERGIC CONJUNCTIVITIS OF BOTH EYES AND RHINITIS: ICD-10-CM

## 2022-04-21 PROCEDURE — 95117 IMMUNOTHERAPY INJECTIONS: CPT | Performed by: NURSE PRACTITIONER

## 2022-04-21 NOTE — PROGRESS NOTES
After consent obtained/verified, allergy injection given in back of R/L arm(s). VIAL COLOR OF ALL VIALS TODAY IS GREEN    ALLERGY INJECTION FROM VIAL A GIVEN right  UPPER ARM IN THE AMOUNT OF 0.20 ML    ALLERGY INJECTION FROM VIAL B GIVEN left lower ARM IN THE AMOUNT OF 0.20 ML    ALLERGY INJECTION FROM VIAL C GIVEN leftupper ARM IN THE AMOUNT OF 0.20 ML      Documentation of vial injection specific to arm(s) noted on Allergy Immunotherapy Administration Form. Patient waited 30 minutes for observation. No      Patient tolerated well without adverse reaction WHILE IN OFFICE    SHOT REACTION TREATMENT INSTRUCTIONS    During the 30 minute wait after an allergy injection the following symptoms should be reported:    Itching other than at the injection site  Hives or swelling other than at the injection site  Redness other than at the injection site  Difficulty breathing  Chest tightness  Difficulty swallowing  Throat tightness    If these symptoms occur, NOTIFY PROVIDER and the following treatment should be administered:    1. Epinephrine/Auvi Q 1:1000 IM - 0.3 ml if > 66 lbs or more, 0.15 ml if 33 - 63 lbs, or 0.1 ml if <33 lbs     2. Diphenhydramine - give all intramuscular:     2 to <6 years (off-label use): 6.25 mg,    6 to <12 years: 12.5 to 25 mg;    ?12 years: 25-50 mg.    3.  Famotidine:  Adults 40 mg oral    Adolescents age 12 years and >88 lbs: 40 mg    Children and Adolescents ? 12years of age: Initial: 0.25 mg/kg/dose  every 12 hours (maximum daily dose: 40 mg/day)    Epi/Auvi Q dose may me repeated in 5-15 minutes if adequate resolution of symptoms does not occur    Patient should be observed for at least one hour after final Epi/Auvi Q dose and must be seen by provider. Patients cannot drive themselves if they have received diphenhydramine.

## 2022-04-25 ENCOUNTER — NURSE ONLY (OUTPATIENT)
Dept: ALLERGY | Age: 47
End: 2022-04-25
Payer: COMMERCIAL

## 2022-04-25 VITALS
SYSTOLIC BLOOD PRESSURE: 100 MMHG | DIASTOLIC BLOOD PRESSURE: 72 MMHG | HEART RATE: 88 BPM | TEMPERATURE: 98.6 F | OXYGEN SATURATION: 96 %

## 2022-04-25 DIAGNOSIS — J30.1 ALLERGY TO TREES: ICD-10-CM

## 2022-04-25 DIAGNOSIS — Z51.6 ENCOUNTER FOR DESENSITIZATION TO ALLERGENS: Primary | ICD-10-CM

## 2022-04-25 DIAGNOSIS — J30.81 ANIMAL DANDER ALLERGY: ICD-10-CM

## 2022-04-25 PROCEDURE — 95117 IMMUNOTHERAPY INJECTIONS: CPT | Performed by: NURSE PRACTITIONER

## 2022-04-25 NOTE — PROGRESS NOTES
After consent obtained/verified, allergy injection given in back of R/L arm(s). VIAL COLOR OF ALL VIALS TODAY IS GREEN    ALLERGY INJECTION FROM VIAL A GIVEN left  UPPER ARM IN THE AMOUNT OF 0.25 ML    ALLERGY INJECTION FROM VIAL B GIVEN right upper ARM IN THE AMOUNT OF 0.25 ML    ALLERGY INJECTION FROM VIAL C GIVEN rightlower ARM IN THE AMOUNT OF 0.25 ML      Documentation of vial injection specific to arm(s) noted on Allergy Immunotherapy Administration Form. Patient waited 30 minutes for observation. No      Patient tolerated well without adverse reaction WHILE IN OFFICE    SHOT REACTION TREATMENT INSTRUCTIONS    During the 30 minute wait after an allergy injection the following symptoms should be reported:    Itching other than at the injection site  Hives or swelling other than at the injection site  Redness other than at the injection site  Difficulty breathing  Chest tightness  Difficulty swallowing  Throat tightness    If these symptoms occur, NOTIFY PROVIDER and the following treatment should be administered:    1. Epinephrine/Auvi Q 1:1000 IM - 0.3 ml if > 66 lbs or more, 0.15 ml if 33 - 63 lbs, or 0.1 ml if <33 lbs     2. Diphenhydramine - give all intramuscular:     2 to <6 years (off-label use): 6.25 mg,    6 to <12 years: 12.5 to 25 mg;    ?12 years: 25-50 mg.    3.  Famotidine:  Adults 40 mg oral    Adolescents age 12 years and >88 lbs: 40 mg    Children and Adolescents ? 12years of age: Initial: 0.25 mg/kg/dose  every 12 hours (maximum daily dose: 40 mg/day)    Epi/Auvi Q dose may me repeated in 5-15 minutes if adequate resolution of symptoms does not occur    Patient should be observed for at least one hour after final Epi/Auvi Q dose and must be seen by provider. Patients cannot drive themselves if they have received diphenhydramine.

## 2022-04-26 ENCOUNTER — OFFICE VISIT (OUTPATIENT)
Dept: ENT CLINIC | Age: 47
End: 2022-04-26
Payer: COMMERCIAL

## 2022-04-26 ENCOUNTER — PREP FOR PROCEDURE (OUTPATIENT)
Dept: ENT CLINIC | Age: 47
End: 2022-04-26

## 2022-04-26 ENCOUNTER — HOSPITAL ENCOUNTER (OUTPATIENT)
Dept: CT IMAGING | Age: 47
Discharge: HOME | End: 2022-04-26
Payer: COMMERCIAL

## 2022-04-26 VITALS
OXYGEN SATURATION: 97 % | SYSTOLIC BLOOD PRESSURE: 120 MMHG | WEIGHT: 188 LBS | HEART RATE: 80 BPM | RESPIRATION RATE: 14 BRPM | HEIGHT: 63 IN | DIASTOLIC BLOOD PRESSURE: 70 MMHG | TEMPERATURE: 97.2 F | BODY MASS INDEX: 33.31 KG/M2

## 2022-04-26 DIAGNOSIS — Z01.818 PRE-OP TESTING: Primary | ICD-10-CM

## 2022-04-26 DIAGNOSIS — J32.2 CHRONIC ETHMOIDITIS: Primary | ICD-10-CM

## 2022-04-26 DIAGNOSIS — J32.1 CHRONIC FRONTAL SINUSITIS: ICD-10-CM

## 2022-04-26 DIAGNOSIS — J32.2 CHRONIC ETHMOIDITIS: ICD-10-CM

## 2022-04-26 PROCEDURE — 70486 CT MAXILLOFACIAL W/O DYE: CPT

## 2022-04-26 PROCEDURE — 99214 OFFICE O/P EST MOD 30 MIN: CPT | Performed by: OTOLARYNGOLOGY

## 2022-04-26 NOTE — PROGRESS NOTES
[facility] [place] [facility]  [facility] EAR, NOSE AND THROAT  [facility] [place]  Dept: [phone]  Dept Fax: [phone]  Loc: [phone]    Lashae Chaves is a 51 y.o. female who was referred by No ref. provider found for:  Chief Complaint   Patient presents with    Follow-up     Patient is here for a follow up appointment after CT scan s/p Ethmoidectomy and rt nasal antral window 2/4/22        HPI:     Lashae Chaves is a 51 y.o. female with a history of chronic rhinosinusitis. She is status post multiple sinus procedures intended to enable her to drain her sinuses properly and be relieved of her chronic rhinosinusitis symptoms to a large degree. She still has some sensation of necrotic debris in her nose although it is becoming more rare. She still has the \"teapot sign\" of fluid accumulating in the right maxillary sinus. The patient is new CT scan to look for any signs of residual paranasal sinus disease. [facility] History: Allergies   Allergen Reactions    Seasonal      Current Outpatient Medications   Medication Sig Dispense Refill    ketotifen (ZADITOR) 0.025 % ophthalmic solution One drop in each eye twice daily as needed 1 each 2    montelukast (SINGULAIR) 10 MG tablet Take 1 tablet by mouth nightly 90 tablet 0    ALLERGEN EXTRACT Twice a Week       DUPIXENT 300 MG/2ML SOPN injection       meclizine (ANTIVERT) 25 MG tablet Take 1 tablet by mouth 4 times daily as needed for Dizziness 40 tablet 1    ZINC PO Take by mouth       Fluticasone Propionate (XHANCE) 93 MCG/ACT EXHU 1 spray by Nasal route 2 times daily 6.3 mL 11    dupilumab (DUPIXENT) 300 MG/2ML SOSY injection Inject 2 mLs into the skin every 14 days 2 Syringe 11    chlorpheniramine (CHLOR-TRIMETON) 2 MG/5ML syrup Take 2 mg by mouth every 4 hours as needed for Allergies       EPINEPHrine (AUVI-Q) 0.3 MG/0.3ML SOAJ injection Dispense 2 packs of 2 (total 4 devices).  Use as directed, STAT for allergic reaction. 2 each 0    Multiple Vitamin (MULTI VITAMIN DAILY PO) Take by mouth       Ascorbic Acid (VITAMIN C PO) Take by mouth       VITAMIN D PO Take by mouth       Probiotic Product (PROBIOTIC PO) Take by mouth 1 tab in morning      b complex vitamins capsule Take 1 capsule by mouth daily       cetirizine (ZYRTEC) 10 MG tablet Take 10 mg by mouth nightly Not in last 10 days      Pseudoephedrine HCl (SUDAFED 12 HOUR PO) Take 1 tablet by mouth daily        No current facility-administered medications for this visit.      Past Medical History:   Diagnosis Date    Asthma     Panic attacks 4/98    PONV (postoperative nausea and vomiting)     Scopalamine patch    Seasonal allergies 4/99    Sinusitis, chronic       Past Surgical History:   Procedure Laterality Date    CYST REMOVAL      INTRAUTERINE DEVICE INSERTION      murena-  Out 2014    SINUS ENDOSCOPY  04/2021    dr Rosalba Pettit Bilateral 04/30/2021    SINUS ENDOSCOPY FUNCTIONAL SURGERY IMAGE GUIDED, RIGHT MAXILLARY ANTROSTOMY WITH REMOVAL OF TISSUE FROM MAXILLARY SINUS, RIGHT PARTIAL ANTERIOR ETHMOIDECTOMY, NASAL ENDOSCOPY OF LEFT ABBEY BULLOSA performed by Monae Rosenbaum MD at [facility] Bilateral 2/4/2022    IMAGE GUIDED ENDOSCOPY SURGERY BILATERAL ETHMOIDECTOMY(OSSEOUS RIGHT ETHMOID AIR CELLS OPACIFIED LEFT ETHMOID AIR CELL) RIGHT NASAL ANTRAL WINDOW performed by Monae Rosenbaum MD at [facility]  02/04/2022    dr Roseanna Vera  07/08/2014    Dr Ethan Daniel; Removal IUD     Family History   Problem Relation Age of Onset    Breast Cancer Mother 52    Breast Cancer Maternal Grandmother 48     Social History     Tobacco Use    Smoking status: Never Smoker    Smokeless tobacco: Never Used   Substance Use Topics    Alcohol use: Yes     Comment: occasinally        Subjective:      Review of Systems  Rest of review of systems are negative, except as noted in HPI. Objective:     /70 (Site: Left Upper Arm, Position: Sitting)   Pulse 80   Temp 97.2 °F (36.2 °C) (Infrared)   Resp 14   Ht 5' 3\" (1.6 m)   Wt 188 lb (85.3 kg)   SpO2 97%   BMI 33.30 kg/m²     Physical Exam     Vitals reviewed. CT FACIAL BONES WO CONTRAST    Result Date: 4/26/2022   1. Postoperative changes involving the ethmoid air cells bilaterally, medial wall of the right maxillary sinus and nasal cavity. 2. There has been clearing of the sinusitis in the right maxillary sinus and diminution in the opacification of the left ethmoid air cells since previous study dated 24 January 2022. 3. The frontal and sphenoid sinuses are clear. 4. Minimal mucosal thickening in the left maxillary sinus. Narrowing of the ostium of the left maxillary sinus. 5. Minimal deviation of the nasal septum towards the left side. . **This report has been created using voice recognition software. It may contain minor errors which are inherent in voice recognition technology. ** Final report electronically signed by DR Paulie Garcia on 4/26/2022 10:41 AM     Lab Results   Component Value Date     03/05/2021    K 4.2 03/05/2021     03/05/2021    CO2 22 03/05/2021    BUN 8 03/05/2021    CREATININE 0.74 03/05/2021    CALCIUM 9.2 03/05/2021    LABALBU 4.2 03/05/2021    BILITOT 0.5 03/05/2021    ALKPHOS 78 03/05/2021    AST 22 03/05/2021    ALT 23 03/05/2021       All of the past medical history, past surgical history, family history,social history, allergies and current medications were reviewed with the patient. Assessment & Plan   Diagnoses and all orders for this visit:     Diagnosis Orders   1. Chronic ethmoiditis  RI NASAL/SINUS NDSC W/RMVL TISS FROM FRONTAL SINUS   2.  Chronic frontal sinusitis  RI NASAL/SINUS NDSC W/RMVL TISS FROM FRONTAL SINUS       Based on the patient's history and physical findings as well as the results of her repeat sinus CT scan, she does have 2 areas of residual chronic sinusitis; 1 in the frontal recess of the right side and 1 in the anterior ethmoid sinuses on the left side. In addition she has a raised area of the maxillary sinus on the right side that needs to be taken down further for the nasal antral window to drain the irrigation fluids and the fetid secretions out of the maxillary sinus so that it does not really thicken and become infested with intramucosal and submucosal bacterial disease. I reviewed with her the indications benefits limitations and risks of proceeding this manner to her satisfaction. She wishes to proceed as expeditiously as possible to give this a little chance to recur as she can. She reported understanding the risks involved and accepting them. We will proceed with her consent. I spent over 30 minutes of face-to-face time with the patient the majority of which was dedicated to reviewing her complex history and planning this next phase of her surgical care. Return in about 4 weeks (around 5/24/2022) for A 2-week follow-up with endoscopy and debridement. **This report has been created using voice recognition software. It may contain minor errors which are inherent in voice recognition technology. **

## 2022-04-28 ENCOUNTER — TELEPHONE (OUTPATIENT)
Dept: ENT CLINIC | Age: 47
End: 2022-04-28

## 2022-04-28 ENCOUNTER — NURSE ONLY (OUTPATIENT)
Dept: ALLERGY | Age: 47
End: 2022-04-28
Payer: COMMERCIAL

## 2022-04-28 VITALS
SYSTOLIC BLOOD PRESSURE: 126 MMHG | TEMPERATURE: 97.1 F | HEART RATE: 91 BPM | RESPIRATION RATE: 16 BRPM | OXYGEN SATURATION: 97 % | DIASTOLIC BLOOD PRESSURE: 76 MMHG

## 2022-04-28 DIAGNOSIS — Z51.6 ENCOUNTER FOR DESENSITIZATION TO ALLERGENS: Primary | ICD-10-CM

## 2022-04-28 DIAGNOSIS — J30.81 ANIMAL DANDER ALLERGY: ICD-10-CM

## 2022-04-28 DIAGNOSIS — J30.1 ALLERGY TO TREES: ICD-10-CM

## 2022-04-28 PROCEDURE — 95117 IMMUNOTHERAPY INJECTIONS: CPT | Performed by: NURSE PRACTITIONER

## 2022-04-28 NOTE — PROGRESS NOTES
After consent obtained/verified, allergy injection given in back of R/L arm(s). VIAL COLOR OF ALL VIALS TODAY IS greeen    ALLERGY INJECTION FROM VIAL A GIVEN right  UPPER ARM IN THE AMOUNT OF 0.30 ML    ALLERGY INJECTION FROM VIAL B GIVEN left upper ARM IN THE AMOUNT OF 0.30 ML    ALLERGY INJECTION FROM VIAL C GIVEN leftlower ARM IN THE AMOUNT OF 0.30 ML      Documentation of vial injection specific to arm(s) noted on Allergy Immunotherapy Administration Form. Patient waited 30 minutes for observation. No      Patient tolerated well without adverse reaction WHILE IN OFFICE    SHOT REACTION TREATMENT INSTRUCTIONS    During the 30 minute wait after an allergy injection the following symptoms should be reported:    Itching other than at the injection site  Hives or swelling other than at the injection site  Redness other than at the injection site  Difficulty breathing  Chest tightness  Difficulty swallowing  Throat tightness    If these symptoms occur, NOTIFY PROVIDER and the following treatment should be administered:    1. Epinephrine/Auvi Q 1:1000 IM - 0.3 ml if > 66 lbs or more, 0.15 ml if 33 - 63 lbs, or 0.1 ml if <33 lbs     2. Diphenhydramine - give all intramuscular:     2 to <6 years (off-label use): 6.25 mg,    6 to <12 years: 12.5 to 25 mg;    ?12 years: 25-50 mg.    3.  Famotidine:  Adults 40 mg oral    Adolescents age 12 years and >88 lbs: 40 mg    Children and Adolescents ? 12years of age: Initial: 0.25 mg/kg/dose  every 12 hours (maximum daily dose: 40 mg/day)    Epi/Auvi Q dose may me repeated in 5-15 minutes if adequate resolution of symptoms does not occur    Patient should be observed for at least one hour after final Epi/Auvi Q dose and must be seen by provider. Patients cannot drive themselves if they have received diphenhydramine.

## 2022-04-28 NOTE — TELEPHONE ENCOUNTER
Pt called stating that she had her injections today at 9am and is having a toby size reaction today at 3:15.   yandel asked and documented. I let her know to call us if not better or go to ED if anaphylactic shock.

## 2022-04-28 NOTE — PROGRESS NOTES
Please  ask the following questions for patient who call in and complain of allergy injection reactions  It is a good idea to pull the patients allergy record prior to asking these questions so you have all the information      1. What were your symptoms?knot toby   2. How long after the injection did it take before you experienced symptoms? Itching right away  3. Did you take your antihistamine on the day you receive the allergy injection? night  4. Do you take your antihistamine in the morning or the evening? eveningWhich allergy injection site caused the reaction? For example, upper arm or lower arm if receiving 2 injections in the same arm left both upper and lower  5. What color vial caused the injection reaction - red, yellow, blue, green, silver green   6. What is the injection dosage that you were given? - see record  7. Have you had a previous or similar reaction prior to this event? Last injection, size smaller than toby  8. How often are you receiving allergy injections? 2 a week   9. Did you have a KNOT that remained on your arm 24 hours AFTER the injection (or the next day)? 10. How large was the allergy injection site KNOT the day you received the injection? Dime size, irish toby size, quarter size, half dollar size, etc.  11. How large was the allergy injection site KNOT the day AFTER you received the injection?  toby   12. How large was the allergy injection site REDNESS the day you received the injection? A little red  13. How large  was the allergy injection site REDNESS the day AFTER you received the injection? 14. Did you take your antihistamine the following day? 15. How long did it take before the allergy injection swelling went down? 16. Did you apply ice to the area of swelling? 17. Do you apply any topical creams and if so what to the area of swelling? 18. Did you experience any anaphylactic symptoms including shortness of breath, throat swelling, chest tightness?   19. Did you experience a rash or any other generalized body symptoms?no  20. Did you use your Epipen? no

## 2022-04-29 NOTE — TELEPHONE ENCOUNTER
Next time tell her to take a Benadryl 25 mg ever 6 hours and let us know if its there by the next day.

## 2022-05-02 ENCOUNTER — PREP FOR PROCEDURE (OUTPATIENT)
Dept: ENT CLINIC | Age: 47
End: 2022-05-02

## 2022-05-02 ENCOUNTER — NURSE ONLY (OUTPATIENT)
Dept: ALLERGY | Age: 47
End: 2022-05-02
Payer: COMMERCIAL

## 2022-05-02 VITALS
DIASTOLIC BLOOD PRESSURE: 76 MMHG | HEART RATE: 74 BPM | SYSTOLIC BLOOD PRESSURE: 122 MMHG | TEMPERATURE: 97.3 F | RESPIRATION RATE: 16 BRPM | OXYGEN SATURATION: 98 %

## 2022-05-02 DIAGNOSIS — J30.1 ALLERGY TO TREES: ICD-10-CM

## 2022-05-02 DIAGNOSIS — J30.81 ANIMAL DANDER ALLERGY: ICD-10-CM

## 2022-05-02 DIAGNOSIS — Z51.6 ENCOUNTER FOR DESENSITIZATION TO ALLERGENS: Primary | ICD-10-CM

## 2022-05-02 PROCEDURE — 95117 IMMUNOTHERAPY INJECTIONS: CPT | Performed by: NURSE PRACTITIONER

## 2022-05-02 RX ORDER — SODIUM CHLORIDE 0.9 % (FLUSH) 0.9 %
5-40 SYRINGE (ML) INJECTION EVERY 12 HOURS SCHEDULED
Status: CANCELLED | OUTPATIENT
Start: 2022-05-02

## 2022-05-02 RX ORDER — DEXAMETHASONE SODIUM PHOSPHATE 4 MG/ML
8 INJECTION, SOLUTION INTRA-ARTICULAR; INTRALESIONAL; INTRAMUSCULAR; INTRAVENOUS; SOFT TISSUE
Status: CANCELLED | OUTPATIENT
Start: 2022-05-02

## 2022-05-02 RX ORDER — SODIUM CHLORIDE 9 MG/ML
INJECTION, SOLUTION INTRAVENOUS PRN
Status: CANCELLED | OUTPATIENT
Start: 2022-05-02

## 2022-05-02 RX ORDER — SODIUM CHLORIDE 0.9 % (FLUSH) 0.9 %
5-40 SYRINGE (ML) INJECTION PRN
Status: CANCELLED | OUTPATIENT
Start: 2022-05-02

## 2022-05-02 NOTE — PROGRESS NOTES
After consent obtained/verified, allergy injection given in back of R/L arm(s). VIAL COLOR OF ALL VIALS TODAY IS green    ALLERGY INJECTION FROM VIAL A GIVEN left  UPPER ARM IN THE AMOUNT OF 0.30 ML    ALLERGY INJECTION FROM VIAL B GIVEN right lower ARM IN THE AMOUNT OF 0.30 ML    ALLERGY INJECTION FROM VIAL C GIVEN rightupper ARM IN THE AMOUNT OF 0.30 ML      Documentation of vial injection specific to arm(s) noted on Allergy Immunotherapy Administration Form. Patient waited 30 minutes for observation. No      Patient tolerated well without adverse reaction WHILE IN OFFICE    SHOT REACTION TREATMENT INSTRUCTIONS    During the 30 minute wait after an allergy injection the following symptoms should be reported:    Itching other than at the injection site  Hives or swelling other than at the injection site  Redness other than at the injection site  Difficulty breathing  Chest tightness  Difficulty swallowing  Throat tightness    If these symptoms occur, NOTIFY PROVIDER and the following treatment should be administered:    1. Epinephrine/Auvi Q 1:1000 IM - 0.3 ml if > 66 lbs or more, 0.15 ml if 33 - 63 lbs, or 0.1 ml if <33 lbs     2. Diphenhydramine - give all intramuscular:     2 to <6 years (off-label use): 6.25 mg,    6 to <12 years: 12.5 to 25 mg;    ?12 years: 25-50 mg.    3.  Famotidine:  Adults 40 mg oral    Adolescents age 12 years and >88 lbs: 40 mg    Children and Adolescents ? 12years of age: Initial: 0.25 mg/kg/dose  every 12 hours (maximum daily dose: 40 mg/day)    Epi/Auvi Q dose may me repeated in 5-15 minutes if adequate resolution of symptoms does not occur    Patient should be observed for at least one hour after final Epi/Auvi Q dose and must be seen by provider. Patients cannot drive themselves if they have received diphenhydramine.

## 2022-05-02 NOTE — PROGRESS NOTES
Following instructions given to patient, who states understanding:    NPO after midnight  [employer] and 's license  Wear comfortable clean clothing  Do not bring jewelry   Shower night before and morning of surgery with a liquid antibacterial soap  Bring medications in original bottles  Follow all instructions given by your physician   needed at discharge  Call [phone] for any questions  Report to SDS on 2nd floor  If you would become ill prior to surgery, please call the surgeon  May have a visitor with you, we request that you limit to 2 visitors in pre-op area  Please bring and wear mask

## 2022-05-02 NOTE — PROGRESS NOTES
In preparation for their surgical procedure above patient was screened for Obstructive Sleep Apnea (JUAN MANUEL) using the STOP-Bang Questionnaire by the Pre-Admission Testing department. This is a pre-surgical screening tool for patient safety and serves as a recommendation, this WILL NOT cause cancellation of surgery. STOP-Bang Questionnaire  * Do you currently see a pulmonologist?  No     If yes STOP, do not complete. Patient follows with Dr.     1.  Do you snore loudly (able to be heard in the next room)? No    2. Do you often feel tired or sleepy during the daytime? No       3. Has anyone ever told you that you stop breathing during your sleep? No    4. Do you have or are you being treated for high blood pressure? No      5. BMI more than 35? BMI (Calculated): 32.8        No    6. Age over 48 years? 51 y.o. No    7. Neck Circumference greater than 17 inches for male or 16 inches for female? Measured           (visits only)            Not Applicable    8. Gender Male? No      TOTAL SCORE: 0    JUAN MANUEL - Low Risk : Yes to 0 - 2 questions  JUAN MANUEL - Intermediate Risk : Yes to 3 - 4 questions  JUAN MANUEL - High Risk : Yes to 5 - 8 questions    Adapted from:   STOP Questionnaire: A Tool to Screen Patients for Obstructive Sleep Apnea   JONAH HeC.P.C., HUNG Bradley.B.B.S., Marybeth Millard M.D., Joaquin Triana. Feliz Berg, Ph.D., HUNG Mae.B.B.S., Murray Sterling, M.Sc., Kat Rogers M.D., Jeramy Valdivia. MARCO ANTONIO Conde.P.C.    Anesthesiology 2008; [id number] Copyright 2008, the [address] of [employer]est[employer], [facility].   ----------------------------------------------------------------------------------------------------------------

## 2022-05-04 ENCOUNTER — NURSE ONLY (OUTPATIENT)
Dept: ALLERGY | Age: 47
End: 2022-05-04
Payer: COMMERCIAL

## 2022-05-04 VITALS
TEMPERATURE: 97.3 F | HEART RATE: 74 BPM | SYSTOLIC BLOOD PRESSURE: 126 MMHG | DIASTOLIC BLOOD PRESSURE: 70 MMHG | RESPIRATION RATE: 16 BRPM | OXYGEN SATURATION: 98 %

## 2022-05-04 DIAGNOSIS — J30.81 ANIMAL DANDER ALLERGY: ICD-10-CM

## 2022-05-04 DIAGNOSIS — J30.1 ALLERGY TO TREES: ICD-10-CM

## 2022-05-04 DIAGNOSIS — Z51.6 ENCOUNTER FOR DESENSITIZATION TO ALLERGENS: Primary | ICD-10-CM

## 2022-05-04 PROCEDURE — 95117 IMMUNOTHERAPY INJECTIONS: CPT | Performed by: NURSE PRACTITIONER

## 2022-05-04 NOTE — PROGRESS NOTES
After consent obtained/verified, allergy injection given in back of R/L arm(s). VIAL COLOR OF ALL VIALS TODAY IS green    ALLERGY INJECTION FROM VIAL A GIVEN right  UPPER ARM IN THE AMOUNT OF 0.35 ML    ALLERGY INJECTION FROM VIAL B GIVEN left lower ARM IN THE AMOUNT OF 0.35 ML    ALLERGY INJECTION FROM VIAL C GIVEN leftupper ARM IN THE AMOUNT OF 0.35 ML      Documentation of vial injection specific to arm(s) noted on Allergy Immunotherapy Administration Form. Patient waited 30 minutes for observation. No      Patient tolerated well without adverse reaction WHILE IN OFFICE    SHOT REACTION TREATMENT INSTRUCTIONS    During the 30 minute wait after an allergy injection the following symptoms should be reported:    Itching other than at the injection site  Hives or swelling other than at the injection site  Redness other than at the injection site  Difficulty breathing  Chest tightness  Difficulty swallowing  Throat tightness    If these symptoms occur, NOTIFY PROVIDER and the following treatment should be administered:    1. Epinephrine/Auvi Q 1:1000 IM - 0.3 ml if > 66 lbs or more, 0.15 ml if 33 - 63 lbs, or 0.1 ml if <33 lbs     2. Diphenhydramine - give all intramuscular:     2 to <6 years (off-label use): 6.25 mg,    6 to <12 years: 12.5 to 25 mg;    ?12 years: 25-50 mg.    3.  Famotidine:  Adults 40 mg oral    Adolescents age 12 years and >88 lbs: 40 mg    Children and Adolescents ? 12years of age: Initial: 0.25 mg/kg/dose  every 12 hours (maximum daily dose: 40 mg/day)    Epi/Auvi Q dose may me repeated in 5-15 minutes if adequate resolution of symptoms does not occur    Patient should be observed for at least one hour after final Epi/Auvi Q dose and must be seen by provider. Patients cannot drive themselves if they have received diphenhydramine.

## 2022-05-05 ENCOUNTER — ANESTHESIA (OUTPATIENT)
Dept: OPERATING ROOM | Age: 47
End: 2022-05-05
Payer: COMMERCIAL

## 2022-05-05 ENCOUNTER — ANESTHESIA EVENT (OUTPATIENT)
Dept: OPERATING ROOM | Age: 47
End: 2022-05-05
Payer: COMMERCIAL

## 2022-05-05 ENCOUNTER — HOSPITAL ENCOUNTER (OUTPATIENT)
Age: 47
Setting detail: OUTPATIENT SURGERY
Discharge: HOME | End: 2022-05-05
Attending: OTOLARYNGOLOGY | Admitting: OTOLARYNGOLOGY
Payer: COMMERCIAL

## 2022-05-05 VITALS
SYSTOLIC BLOOD PRESSURE: 101 MMHG | RESPIRATION RATE: 16 BRPM | TEMPERATURE: 88.3 F | OXYGEN SATURATION: 99 % | DIASTOLIC BLOOD PRESSURE: 70 MMHG

## 2022-05-05 VITALS
DIASTOLIC BLOOD PRESSURE: 66 MMHG | HEART RATE: 77 BPM | WEIGHT: 184 LBS | HEIGHT: 63 IN | RESPIRATION RATE: 16 BRPM | TEMPERATURE: 98 F | SYSTOLIC BLOOD PRESSURE: 113 MMHG | BODY MASS INDEX: 32.6 KG/M2 | OXYGEN SATURATION: 98 %

## 2022-05-05 PROCEDURE — 6370000000 HC RX 637 (ALT 250 FOR IP): Performed by: NURSE ANESTHETIST, CERTIFIED REGISTERED

## 2022-05-05 PROCEDURE — 6360000002 HC RX W HCPCS: Performed by: NURSE ANESTHETIST, CERTIFIED REGISTERED

## 2022-05-05 PROCEDURE — 2720000010 HC SURG SUPPLY STERILE: Performed by: OTOLARYNGOLOGY

## 2022-05-05 PROCEDURE — 31267 ENDOSCOPY MAXILLARY SINUS: CPT | Performed by: OTOLARYNGOLOGY

## 2022-05-05 PROCEDURE — 2580000003 HC RX 258: Performed by: OTOLARYNGOLOGY

## 2022-05-05 PROCEDURE — 3600000014 HC SURGERY LEVEL 4 ADDTL 15MIN: Performed by: OTOLARYNGOLOGY

## 2022-05-05 PROCEDURE — 31255 NSL/SINS NDSC W/TOT ETHMDCT: CPT | Performed by: OTOLARYNGOLOGY

## 2022-05-05 PROCEDURE — 6370000000 HC RX 637 (ALT 250 FOR IP): Performed by: ANESTHESIOLOGY

## 2022-05-05 PROCEDURE — 6370000000 HC RX 637 (ALT 250 FOR IP): Performed by: OTOLARYNGOLOGY

## 2022-05-05 PROCEDURE — 2709999900 HC NON-CHARGEABLE SUPPLY: Performed by: OTOLARYNGOLOGY

## 2022-05-05 PROCEDURE — 3600000004 HC SURGERY LEVEL 4 BASE: Performed by: OTOLARYNGOLOGY

## 2022-05-05 PROCEDURE — 2500000003 HC RX 250 WO HCPCS: Performed by: NURSE ANESTHETIST, CERTIFIED REGISTERED

## 2022-05-05 PROCEDURE — 3700000000 HC ANESTHESIA ATTENDED CARE: Performed by: OTOLARYNGOLOGY

## 2022-05-05 PROCEDURE — 31276 NSL/SINS NDSC FRNT TISS RMVL: CPT | Performed by: OTOLARYNGOLOGY

## 2022-05-05 PROCEDURE — 6360000002 HC RX W HCPCS

## 2022-05-05 PROCEDURE — 7100000011 HC PHASE II RECOVERY - ADDTL 15 MIN: Performed by: OTOLARYNGOLOGY

## 2022-05-05 PROCEDURE — 7100000010 HC PHASE II RECOVERY - FIRST 15 MIN: Performed by: OTOLARYNGOLOGY

## 2022-05-05 PROCEDURE — 6360000002 HC RX W HCPCS: Performed by: OTOLARYNGOLOGY

## 2022-05-05 PROCEDURE — 7100000001 HC PACU RECOVERY - ADDTL 15 MIN: Performed by: OTOLARYNGOLOGY

## 2022-05-05 PROCEDURE — 88305 TISSUE EXAM BY PATHOLOGIST: CPT

## 2022-05-05 PROCEDURE — 3700000001 HC ADD 15 MINUTES (ANESTHESIA): Performed by: OTOLARYNGOLOGY

## 2022-05-05 PROCEDURE — 7100000000 HC PACU RECOVERY - FIRST 15 MIN: Performed by: OTOLARYNGOLOGY

## 2022-05-05 RX ORDER — EPINEPHRINE 1 MG/ML
INJECTION, SOLUTION, CONCENTRATE INTRAVENOUS PRN
Status: DISCONTINUED | OUTPATIENT
Start: 2022-05-05 | End: 2022-05-05 | Stop reason: ALTCHOICE

## 2022-05-05 RX ORDER — SODIUM CHLORIDE 0.9 % (FLUSH) 0.9 %
5-40 SYRINGE (ML) INJECTION EVERY 12 HOURS SCHEDULED
Status: DISCONTINUED | OUTPATIENT
Start: 2022-05-05 | End: 2022-05-05 | Stop reason: HOSPADM

## 2022-05-05 RX ORDER — MINERAL OIL AND WHITE PETROLATUM 150; 830 MG/G; MG/G
OINTMENT OPHTHALMIC PRN
Status: DISCONTINUED | OUTPATIENT
Start: 2022-05-05 | End: 2022-05-05 | Stop reason: SDUPTHER

## 2022-05-05 RX ORDER — MIDAZOLAM HYDROCHLORIDE 1 MG/ML
INJECTION INTRAMUSCULAR; INTRAVENOUS PRN
Status: DISCONTINUED | OUTPATIENT
Start: 2022-05-05 | End: 2022-05-05

## 2022-05-05 RX ORDER — FENTANYL CITRATE 50 UG/ML
INJECTION, SOLUTION INTRAMUSCULAR; INTRAVENOUS PRN
Status: DISCONTINUED | OUTPATIENT
Start: 2022-05-05 | End: 2022-05-05

## 2022-05-05 RX ORDER — SODIUM CHLORIDE 9 MG/ML
INJECTION, SOLUTION INTRAVENOUS PRN
Status: DISCONTINUED | OUTPATIENT
Start: 2022-05-05 | End: 2022-05-05 | Stop reason: HOSPADM

## 2022-05-05 RX ORDER — SODIUM CHLORIDE 0.9 % (FLUSH) 0.9 %
5-40 SYRINGE (ML) INJECTION PRN
Status: DISCONTINUED | OUTPATIENT
Start: 2022-05-05 | End: 2022-05-05 | Stop reason: HOSPADM

## 2022-05-05 RX ORDER — ROCURONIUM BROMIDE 10 MG/ML
INJECTION, SOLUTION INTRAVENOUS PRN
Status: DISCONTINUED | OUTPATIENT
Start: 2022-05-05 | End: 2022-05-05 | Stop reason: SDUPTHER

## 2022-05-05 RX ORDER — MIDAZOLAM HYDROCHLORIDE 1 MG/ML
INJECTION INTRAMUSCULAR; INTRAVENOUS PRN
Status: DISCONTINUED | OUTPATIENT
Start: 2022-05-05 | End: 2022-05-05 | Stop reason: SDUPTHER

## 2022-05-05 RX ORDER — LIDOCAINE HCL/PF 100 MG/5ML
SYRINGE (ML) INJECTION PRN
Status: DISCONTINUED | OUTPATIENT
Start: 2022-05-05 | End: 2022-05-05 | Stop reason: SDUPTHER

## 2022-05-05 RX ORDER — FENTANYL CITRATE 50 UG/ML
25 INJECTION, SOLUTION INTRAMUSCULAR; INTRAVENOUS EVERY 5 MIN PRN
Status: DISCONTINUED | OUTPATIENT
Start: 2022-05-05 | End: 2022-05-05 | Stop reason: HOSPADM

## 2022-05-05 RX ORDER — ONDANSETRON 2 MG/ML
INJECTION INTRAMUSCULAR; INTRAVENOUS PRN
Status: DISCONTINUED | OUTPATIENT
Start: 2022-05-05 | End: 2022-05-05 | Stop reason: SDUPTHER

## 2022-05-05 RX ORDER — FENTANYL CITRATE 50 UG/ML
INJECTION, SOLUTION INTRAMUSCULAR; INTRAVENOUS PRN
Status: DISCONTINUED | OUTPATIENT
Start: 2022-05-05 | End: 2022-05-05 | Stop reason: SDUPTHER

## 2022-05-05 RX ORDER — GLYCOPYRROLATE 1 MG/5 ML
SYRINGE (ML) INTRAVENOUS PRN
Status: DISCONTINUED | OUTPATIENT
Start: 2022-05-05 | End: 2022-05-05 | Stop reason: SDUPTHER

## 2022-05-05 RX ORDER — DEXAMETHASONE SODIUM PHOSPHATE 4 MG/ML
8 INJECTION, SOLUTION INTRA-ARTICULAR; INTRALESIONAL; INTRAMUSCULAR; INTRAVENOUS; SOFT TISSUE
Status: DISCONTINUED | OUTPATIENT
Start: 2022-05-05 | End: 2022-05-05 | Stop reason: CLARIF

## 2022-05-05 RX ORDER — SCOLOPAMINE TRANSDERMAL SYSTEM 1 MG/1
1 PATCH, EXTENDED RELEASE TRANSDERMAL ONCE
Status: DISCONTINUED | OUTPATIENT
Start: 2022-05-05 | End: 2022-05-05 | Stop reason: HOSPADM

## 2022-05-05 RX ORDER — FENTANYL CITRATE 50 UG/ML
50 INJECTION, SOLUTION INTRAMUSCULAR; INTRAVENOUS EVERY 5 MIN PRN
Status: DISCONTINUED | OUTPATIENT
Start: 2022-05-05 | End: 2022-05-05 | Stop reason: HOSPADM

## 2022-05-05 RX ORDER — CIPROFLOXACIN 500 MG/1
500 TABLET, FILM COATED ORAL 2 TIMES DAILY
Qty: 20 TABLET | Refills: 0 | Status: SHIPPED | OUTPATIENT
Start: 2022-05-05 | End: 2022-05-15

## 2022-05-05 RX ORDER — OXYMETAZOLINE HYDROCHLORIDE 0.05 G/100ML
SPRAY NASAL PRN
Status: DISCONTINUED | OUTPATIENT
Start: 2022-05-05 | End: 2022-05-05 | Stop reason: ALTCHOICE

## 2022-05-05 RX ORDER — PROPOFOL 10 MG/ML
INJECTION, EMULSION INTRAVENOUS PRN
Status: DISCONTINUED | OUTPATIENT
Start: 2022-05-05 | End: 2022-05-05 | Stop reason: SDUPTHER

## 2022-05-05 RX ORDER — DEXAMETHASONE SODIUM PHOSPHATE 10 MG/ML
INJECTION, EMULSION INTRAMUSCULAR; INTRAVENOUS PRN
Status: DISCONTINUED | OUTPATIENT
Start: 2022-05-05 | End: 2022-05-05 | Stop reason: SDUPTHER

## 2022-05-05 RX ORDER — DEXAMETHASONE SODIUM PHOSPHATE 10 MG/ML
8 INJECTION, EMULSION INTRAMUSCULAR; INTRAVENOUS
Status: DISCONTINUED | OUTPATIENT
Start: 2022-05-05 | End: 2022-05-05 | Stop reason: HOSPADM

## 2022-05-05 RX ADMIN — FENTANYL CITRATE 100 MCG: 50 INJECTION INTRAMUSCULAR; INTRAVENOUS at 13:37

## 2022-05-05 RX ADMIN — ONDANSETRON 4 MG: 2 INJECTION INTRAMUSCULAR; INTRAVENOUS at 15:22

## 2022-05-05 RX ADMIN — DEXAMETHASONE SODIUM PHOSPHATE 5 MG: 10 INJECTION, EMULSION INTRAMUSCULAR; INTRAVENOUS at 13:58

## 2022-05-05 RX ADMIN — Medication 0.5 MG: at 15:51

## 2022-05-05 RX ADMIN — MIDAZOLAM 2 MG: 1 INJECTION INTRAMUSCULAR; INTRAVENOUS at 13:35

## 2022-05-05 RX ADMIN — FENTANYL CITRATE 50 MCG: 50 INJECTION INTRAMUSCULAR; INTRAVENOUS at 15:28

## 2022-05-05 RX ADMIN — PROPOFOL 200 MG: 10 INJECTION, EMULSION INTRAVENOUS at 13:38

## 2022-05-05 RX ADMIN — HYDROMORPHONE HYDROCHLORIDE 0.5 MG: 1 INJECTION, SOLUTION INTRAMUSCULAR; INTRAVENOUS; SUBCUTANEOUS at 15:51

## 2022-05-05 RX ADMIN — Medication 0.2 MG: at 13:42

## 2022-05-05 RX ADMIN — DEXAMETHASONE SODIUM PHOSPHATE 8 MG: 10 INJECTION, EMULSION INTRAMUSCULAR; INTRAVENOUS at 10:59

## 2022-05-05 RX ADMIN — SODIUM CHLORIDE 3000 MG: 900 INJECTION INTRAVENOUS at 13:49

## 2022-05-05 RX ADMIN — MINERAL OIL AND WHITE PETROLATUM 1 APPLICATOR: 150; 830 OINTMENT OPHTHALMIC at 13:39

## 2022-05-05 RX ADMIN — SODIUM CHLORIDE: 9 INJECTION, SOLUTION INTRAVENOUS at 10:29

## 2022-05-05 RX ADMIN — SODIUM CHLORIDE: 9 INJECTION, SOLUTION INTRAVENOUS at 14:08

## 2022-05-05 RX ADMIN — SUGAMMADEX 200 MG: 100 INJECTION, SOLUTION INTRAVENOUS at 15:21

## 2022-05-05 RX ADMIN — ROCURONIUM BROMIDE 30 MG: 50 INJECTION, SOLUTION INTRAVENOUS at 14:14

## 2022-05-05 RX ADMIN — FENTANYL CITRATE 100 MCG: 50 INJECTION INTRAMUSCULAR; INTRAVENOUS at 14:24

## 2022-05-05 RX ADMIN — ROCURONIUM BROMIDE 40 MG: 50 INJECTION, SOLUTION INTRAVENOUS at 13:38

## 2022-05-05 RX ADMIN — Medication 80 MG: at 13:37

## 2022-05-05 ASSESSMENT — PULMONARY FUNCTION TESTS
PIF_VALUE: 17
PIF_VALUE: 19
PIF_VALUE: 4
PIF_VALUE: 15
PIF_VALUE: 14
PIF_VALUE: 21
PIF_VALUE: 14
PIF_VALUE: 16
PIF_VALUE: 15
PIF_VALUE: 14
PIF_VALUE: 15
PIF_VALUE: 19
PIF_VALUE: 17
PIF_VALUE: 17
PIF_VALUE: 15
PIF_VALUE: 20
PIF_VALUE: 1
PIF_VALUE: 15
PIF_VALUE: 21
PIF_VALUE: 13
PIF_VALUE: 16
PIF_VALUE: 15
PIF_VALUE: 20
PIF_VALUE: 14
PIF_VALUE: 15
PIF_VALUE: 38
PIF_VALUE: 19
PIF_VALUE: 21
PIF_VALUE: 17
PIF_VALUE: 21
PIF_VALUE: 15
PIF_VALUE: 17
PIF_VALUE: 21
PIF_VALUE: 19
PIF_VALUE: 14
PIF_VALUE: 15
PIF_VALUE: 20
PIF_VALUE: 15
PIF_VALUE: 15
PIF_VALUE: 20
PIF_VALUE: 15
PIF_VALUE: 17
PIF_VALUE: 17
PIF_VALUE: 15
PIF_VALUE: 14
PIF_VALUE: 16
PIF_VALUE: 15
PIF_VALUE: 15
PIF_VALUE: 21
PIF_VALUE: 15
PIF_VALUE: 15
PIF_VALUE: 21
PIF_VALUE: 19
PIF_VALUE: 14
PIF_VALUE: 15
PIF_VALUE: 19
PIF_VALUE: 17
PIF_VALUE: 15
PIF_VALUE: 14
PIF_VALUE: 19
PIF_VALUE: 15
PIF_VALUE: 16
PIF_VALUE: 17
PIF_VALUE: 14
PIF_VALUE: 15
PIF_VALUE: 15
PIF_VALUE: 35
PIF_VALUE: 20
PIF_VALUE: 14
PIF_VALUE: 16
PIF_VALUE: 19
PIF_VALUE: 14
PIF_VALUE: 21
PIF_VALUE: 15
PIF_VALUE: 1
PIF_VALUE: 21
PIF_VALUE: 1
PIF_VALUE: 0
PIF_VALUE: 17
PIF_VALUE: 20
PIF_VALUE: 4
PIF_VALUE: 17
PIF_VALUE: 14
PIF_VALUE: 14
PIF_VALUE: 19
PIF_VALUE: 16
PIF_VALUE: 15
PIF_VALUE: 14
PIF_VALUE: 15
PIF_VALUE: 20
PIF_VALUE: 14
PIF_VALUE: 20
PIF_VALUE: 20
PIF_VALUE: 19
PIF_VALUE: 14
PIF_VALUE: 19
PIF_VALUE: 20
PIF_VALUE: 14
PIF_VALUE: 19
PIF_VALUE: 15
PIF_VALUE: 0
PIF_VALUE: 15
PIF_VALUE: 17
PIF_VALUE: 18
PIF_VALUE: 20
PIF_VALUE: 15
PIF_VALUE: 15
PIF_VALUE: 20
PIF_VALUE: 15
PIF_VALUE: 21
PIF_VALUE: 21

## 2022-05-05 ASSESSMENT — PAIN SCALES - GENERAL
PAINLEVEL_OUTOF10: 7
PAINLEVEL_OUTOF10: 2
PAINLEVEL_OUTOF10: 8
PAINLEVEL_OUTOF10: 4

## 2022-05-05 ASSESSMENT — PAIN - FUNCTIONAL ASSESSMENT: PAIN_FUNCTIONAL_ASSESSMENT: NONE - DENIES PAIN

## 2022-05-05 ASSESSMENT — PAIN DESCRIPTION - LOCATION
LOCATION: NOSE
LOCATION: NOSE

## 2022-05-05 NOTE — ANESTHESIA PRE PROCEDURE
Department of Anesthesiology  Preprocedure Note       Name:  Yarelis Purdy   Age:  51 y.o.  :  1975                                          MRN:  [id number]         Date:  2022      Surgeon: George Bryant):  Lily Matthew MD    Procedure: Procedure(s):  Image Guided Surgery for Right Frontal Sinus Exploration with or without Removal of Tissue Right Nasal Antral Window Revision Left Ethmoidectomy Revision    Medications prior to admission:   Prior to Admission medications    Medication Sig Start Date End Date Taking? Authorizing Provider   ketotifen (ZADITOR) 0.025 % ophthalmic solution One drop in each eye twice daily as needed 3/28/22   Yulia Mail, APRN - CNP   montelukast (SINGULAIR) 10 MG tablet Take 1 tablet by mouth nightly 3/28/22   Yulia Mail, APRN - CNP   ALLERGEN EXTRACT Inject as directed Twice a Week     Historical Provider, MD   meclizine (ANTIVERT) 25 MG tablet Take 1 tablet by mouth 4 times daily as needed for Dizziness 22   Fermin Suazo MD   ZINC PO Take by mouth daily     Historical Provider, MD   Fluticasone Propionate Sonam Furbish) 93 MCG/ACT EXHU 1 spray by Nasal route 2 times daily 21   Alok Mail, APRN - CNP   dupilumab (DUPIXENT) 300 MG/2ML SOSY injection Inject 2 mLs into the skin every 14 days 21   Alok Ginger, APRN - CNP   chlorpheniramine (CHLOR-TRIMETON) 2 MG/5ML syrup Take 2 mg by mouth every 4 hours as needed for Allergies     Historical Provider, MD   EPINEPHrine (AUVI-Q) 0.3 MG/0.3ML SOAJ injection Dispense 2 packs of 2 (total 4 devices). Use as directed, STAT for allergic reaction.  7/15/21   Yulia Miles, APRN - CNP   Multiple Vitamin (MULTI VITAMIN DAILY PO) Take by mouth daily     Historical Provider, MD   Ascorbic Acid (VITAMIN C PO) Take by mouth daily     Historical Provider, MD   VITAMIN D PO Take by mouth 2 times daily     Historical Provider, MD   Probiotic Product (PROBIOTIC PO) Take by mouth 1 tab in morning    Historical Provider, MD b complex vitamins capsule Take 1 capsule by mouth daily     Historical Provider, MD   cetirizine (ZYRTEC) 10 MG tablet Take 10 mg by mouth nightly Not in last 10 days    Historical Provider, MD   Pseudoephedrine HCl (SUDAFED 12 HOUR PO) Take 1 tablet by mouth daily     Historical Provider, MD       Current medications:    Current Facility-Administered Medications   Medication Dose Route Frequency Provider Last Rate Last Admin    ampicillin-sulbactam (UNASYN) 3000 mg ivpb minibag  3,000 mg IntraVENous On Call to Claribel Fragoso MD        0.9 % sodium chloride infusion   IntraVENous PRN Belinda Buitrago MD        sodium chloride flush 0.9 % injection 5-40 mL  5-40 mL IntraVENous 2 times per day Belinda Buitrago MD        sodium chloride flush 0.9 % injection 5-40 mL  5-40 mL IntraVENous PRN Belinda Buitrago MD        scopolamine (TRANSDERM-SCOP) transdermal patch 1 patch  1 patch TransDERmal Once Savi Even, DO   1 patch at 05/05/22 1046    dexamethasone (PF) (DECADRON) injection 8 mg  8 mg IntraVENous 30 Min Pre-Op Belinda Buitrago MD   8 mg at 05/05/22 1059       Allergies:     Allergies   Allergen Reactions    [place] [Macadamia Nut Oil]      Almonds per allergy testing    Seasonal        Problem List:    Patient Active Problem List   Diagnosis Code    Seasonal allergies J30.2    Panic attacks F41.0    Asthma J45.909    Acute non-recurrent ethmoidal sinusitis J01.20    Chronic pansinusitis J32.4    Nasal obstruction J34.89    Nasal valve collapse J34.89    Obstructive sleep apnea G47.33    Chronic maxillary sinusitis J32.0    Chronic ethmoiditis J32.2    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    Status post functional endoscopic sinus surgery (FESS) Z98.890    Nasal polyps J33.9    Allergic rhinoconjunctivitis J30.9, H10.10    Non-seasonal allergic rhinitis J30.89    Allergic rhinitis J30.9    Nasal polyposis J33.9       Past Medical History:        Diagnosis Date    Asthma     COVID 01/09/2022    Panic attacks 4/98    PONV (postoperative nausea and vomiting)     Scopalamine patch    Seasonal allergies 4/99    Sinusitis, chronic        Past Surgical History:        Procedure Laterality Date    CYST REMOVAL      INTRAUTERINE DEVICE INSERTION      murena-  Out 2014    SINUS ENDOSCOPY  04/2021    dr Sasha Tellez Bilateral 04/30/2021    SINUS ENDOSCOPY FUNCTIONAL SURGERY IMAGE GUIDED, RIGHT MAXILLARY ANTROSTOMY WITH REMOVAL OF TISSUE FROM MAXILLARY SINUS, RIGHT PARTIAL ANTERIOR ETHMOIDECTOMY, NASAL ENDOSCOPY OF LEFT ABBEY BULLOSA performed by Jordan Calixto MD at [facility] Bilateral 2/4/2022    IMAGE GUIDED ENDOSCOPY SURGERY BILATERAL ETHMOIDECTOMY(OSSEOUS RIGHT ETHMOID AIR CELLS OPACIFIED LEFT ETHMOID AIR CELL) RIGHT NASAL ANTRAL WINDOW performed by Jordan Calixto MD at [address] [facility]  02/04/2022    dr Mena [place]  07/08/2014    Dr Sofia Menard; Removal IUD       Social History:    Social History     Tobacco Use    Smoking status: Never Smoker    Smokeless tobacco: Never Used   Substance Use Topics    Alcohol use: Yes     Comment: occasinally                                Counseling given: Not Answered      Vital Signs (Current):   Vitals:    05/02/22 1236 05/05/22 1011   BP:  123/86   Pulse:  80   Resp:  16   Temp:  98.4 °F (36.9 °C)   TempSrc:  Temporal   SpO2:  97%   Weight: 185 lb (83.9 kg) 184 lb (83.5 kg)   Height: 5' 3\" (1.6 m) 5' 3\" (1.6 m)                                              BP Readings from Last 3 Encounters:   05/05/22 123/86   05/04/22 126/70   05/02/22 122/76       NPO Status: Time of last liquid consumption: 2200                        Time of last solid consumption: 2200                        Date of last liquid consumption: 05/04/22                        Date of last solid food consumption: 05/04/22    BMI:   Wt Readings from Last 3 Encounters:   05/05/22 184 lb (83.5 kg)   04/26/22 188 lb (85.3 kg)   03/28/22 187 lb (84.8 kg)     Body mass index is 32.59 kg/m². CBC:   Lab Results   Component Value Date    WBC 5.8 03/05/2021    RBC 4.79 03/05/2021    HGB 14.4 03/05/2021    HCT 42.5 03/05/2021    MCV 89 03/05/2021     03/05/2021       CMP:   Lab Results   Component Value Date     03/05/2021    K 4.2 03/05/2021     03/05/2021    CO2 22 03/05/2021    BUN 8 03/05/2021    CREATININE 0.74 03/05/2021    GLUCOSE 90 03/05/2021    CALCIUM 9.2 03/05/2021    BILITOT 0.5 03/05/2021    ALKPHOS 78 03/05/2021    AST 22 03/05/2021    ALT 23 03/05/2021       POC Tests: No results for input(s): POCGLU, POCNA, POCK, POCCL, POCBUN, POCHEMO, POCHCT in the last 72 hours. Coags: No results found for: PROTIME, INR, APTT    HCG (If Applicable): No results found for: PREGTESTUR, PREGSERUM, HCG, HCGQUANT     ABGs: No results found for: PHART, PO2ART, KXM0VBL, XUR4QEQ, BEART, P3KYLTAQ     Type & Screen (If Applicable):  No results found for: LABABO, LABRH    Drug/Infectious Status (If Applicable):  No results found for: HIV, HEPCAB    COVID-19 Screening (If Applicable):   Lab Results   Component Value Date    COVID19 NOT DETECTED 04/23/2021           Anesthesia Evaluation  Patient summary reviewed and Nursing notes reviewed   history of anesthetic complications: PONV. Airway: Mallampati: II        Dental:          Pulmonary: breath sounds clear to auscultation  (+) sleep apnea:  asthma:                            Cardiovascular:  Exercise tolerance: good (>4 METS),           Rhythm: regular  Rate: normal                    Neuro/Psych:               GI/Hepatic/Renal:             Endo/Other:                     Abdominal:       Abdomen: soft. Vascular: Other Findings:             Anesthesia Plan      general     ASA 2       Induction: intravenous. MIPS: Postoperative opioids intended and Prophylactic antiemetics administered. Anesthetic plan and risks discussed with patient.       Plan discussed with CRNA.                   [address] [facility],    5/5/2022

## 2022-05-05 NOTE — OP NOTE
Operative Note      Patient: Francisco May  YOB: 1975  MRN: [id number]    Date of Procedure: 5/5/2022    Pre-Op Diagnosis: Chronic ethmoiditis Chronic frontal sinusitis    Post-Op Diagnosis: Same; ethmoid bone marrow space       Procedure(s):  Image Guided Surgery for Right Frontal Sinus Exploration with Removal of Tissue, Right Nasal Antral Window Revision Left Ethmoidectomy Revision    Surgeon(s):  Barbara Ochoa MD    Assistant:   * No surgical staff found *    Anesthesia: General    Estimated Blood Loss (mL): less than 50     Complications: None    Specimens:   ID Type Source Tests Collected by Time Destination   A : Right Frontal Recess Tissue Sinus SURGICAL PATHOLOGY Barbara Ochoa MD 5/5/2022 1501    B : Revision Nasal Antral Window Tissue Sinus SURGICAL PATHOLOGY Barbara Ochoa MD 5/5/2022 1503    C : Right Residual Ethmoid Tissue Sinus SURGICAL PATHOLOGY Barbara Ochoa MD 5/5/2022 1509        Implants:  * No implants in log *      Drains: * No LDAs found *    Findings: 1. Nasal antral window easily visualized and markedly enlarged using Kerrison forceps and debrider's  2. Residual anterior ethmoid air cell found to be nidus of bone marrow space; opened and cauterized  3.   Frontal recess opened widely as the only other residual air cell system that might be producing symptoms of fetid secretions and soft tissue debris    Surgical images:      Area of residual ethmoid disease lower central area of view    Right maxillary sinus    Nasal antral window; initial view    NA window close-up    Frontal recess exploration    Frontal recess exploration    And a window after first Kerrison bite    Initial opening to expanded and a window    Cottonoid within any window    Open frontal recess and small nasal septal perforation    Open frontal recess and small nasal septal perforation        Finished nasal antral window          Detailed Description of Procedure:   Patient was taken to the operating room awake and placed in supine position. General anesthesia was induced and the patient was intubated with a 7.0 endotracheal tube on first attempt without difficulty or vital sign stability. A timeout was employed verifying the patient's identity and planned procedure. The table was turned 90 degrees. The patient was prepped and draped in usual fashion for an aseptic approach to the endonasal space. I prepared the patient with technology sufficient to use CT computer navigation for focal treatment of residual paranasal sinus disease. I achieved a finer than 1 mm accuracy for the entirety of the patient's nasal and facial vault. I verify the accuracy with internal nasal structures. Revision nasal antral window: After visualizing the nasal antral window with a 30 degree optical telescope, I placed an Afrin pledget within it and then changed to a 70 degree telescope second look from within the sinus down at the end a window and through the nose. I then brought Cl Cam onto the field and bit through the maxillary bone along the inferior aspect of the any window in order to make it significantly bigger and bring it closer to the floor of the nasal airway. I used the debrider to remove the residual mucosa in the same region. Topical Afrin was necessary for hemostasis. Turned my attention to the residual ethmoid air cell tissue and an opacified apparent air cell. I carefully identified the location of this nidus and determined that it was next to the orbit and close to the skull base. I therefore pressed in the area with an ostium seeker and found the bone to be very firm. Nonetheless after definitively establishing the location of this nidus I used a J curette to open the anterior inferior wall and opened into an apparent marrow space. I suctioned out some of the marrow debris within this space and used suction cautery to achieve hemostasis.     Frontal recess exploration: I then turned my attention to the residual ethmoid air cells encompassing the frontal recess. After identifying the distinct location of the frontal sinus using an ostium seeker, I began the process of taking down the agar nasi air cells that form the frontal duct. I gradually widen that space until I opened widely into the frontal sinus and could see directly into the sinus using the 70 degree telescope. I then used my debrider blade within the channel rotated the blade circumferentially in order to widen the channel to the point that it was widely patent and could easily have 2 or 3 diameters of the debrider blade for without resistance into the frontal sinus itself. There was some turbidity to the mucus in this region but no outright pus was encountered. I irrigated out the frontal sinus and the maxillary sinus with copious amounts of sterile saline. I used suction cautery to achieve additional hemostasis along with topical Afrin. After rinsing out both nasal airways no active bleeding was detected. As such I consider the operation concluded. I suctioned out the nasopharynx and oropharynx and turned the patient back to anesthesia for reversal extubation in the operating room. This was carried out without incident and the patient was taken to recovery in satisfactory condition. Estimated blood loss in the case was less than 40 cc and the patient received approximately a liter of intravenous lactated Ringer's intraoperatively. No blood products were transfused. No intraoperative complications were detected. Counts were considered accurate x3. I performed the patient's entire operation myself.         Electronically signed by Larry Mckenzie MD on 5/5/2022 at 3:46 PM

## 2022-05-05 NOTE — PROGRESS NOTES
1534: Pt arrives to pacu, awakens to verbal stimuli. Pt on room air, respirations easy and unlabored. Pt c/o pain, fentanyl administered per crna. VSS  1551: Pt c/o pain 7/10, 0.5mg of dilaudid administered  1600: Pt resting off and on with eyes closed, easily awakens to voice. Noted improvement in pain. Dr. Magali Sultana at bedside to update pt, no new orders given  [id number]: Pt meets criteria for discharge from pacu, transported back to [facility] in stable condition.

## 2022-05-05 NOTE — PROGRESS NOTES
Pt returned to [facility] room 6. Vitals and assessment as charted. 0.9 infusing, @600ml to count from PACU. Pt has sherbet and ginger ale. Family at the bedside. Pt and family verbalized understanding of discharge criteria and call light use. Call light in reach.

## 2022-05-05 NOTE — H&P
HISTORY AND PHYSICAL             Date: 5/5/2022        Patient Name: Norma Melo     YOB: 1975      Age:  51 y.o. Chief Complaint   No chief complaint on file. Chronic sinusitis    History Obtained From   patient    History of Present Illness   The patient has a history of chronic rhinosinusitis including a sensation of necrotic debris in her right nasal airway consistent with continued fetid matter/poss within a few isolated residual ethmoid sinuses. In addition she continues to have some accumulation of fluid in the right maxillary sinus for which the nasal antral window will need to be expanded to allow further drainage of that fluid to reduce further sources of infection. Past Medical History     Past Medical History:   Diagnosis Date    Asthma     COVID 01/09/2022    Panic attacks 4/98    PONV (postoperative nausea and vomiting)     Scopalamine patch    Seasonal allergies 4/99    Sinusitis, chronic         Past Surgical History     Past Surgical History:   Procedure Laterality Date    CYST REMOVAL      INTRAUTERINE DEVICE INSERTION      murena-  Out 2014    SINUS ENDOSCOPY  04/2021    dr Silvia Croft Bilateral 04/30/2021    SINUS ENDOSCOPY FUNCTIONAL SURGERY IMAGE GUIDED, RIGHT MAXILLARY ANTROSTOMY WITH REMOVAL OF TISSUE FROM MAXILLARY SINUS, RIGHT PARTIAL ANTERIOR ETHMOIDECTOMY, NASAL ENDOSCOPY OF LEFT ABBEY BULLOSA performed by Sami Ferrell MD at [address] [place] [address] Bilateral 2/4/2022    IMAGE GUIDED ENDOSCOPY SURGERY BILATERAL ETHMOIDECTOMY(OSSEOUS RIGHT ETHMOID AIR CELLS OPACIFIED LEFT ETHMOID AIR CELL) RIGHT NASAL ANTRAL WINDOW performed by Sami Ferrell MD at [address] [place] [address]  02/04/2022    dr Leydi Gomez  07/08/2014    Dr David Manriquez; Removal IUD        Medications Prior to Admission     Prior to Admission medications    Medication Sig Start Date End Date Taking?  Authorizing Provider   ketotifen (ZADITOR) 0.025 % ophthalmic solution One drop in each eye twice daily as needed 3/28/22   RICH Calvo CNP   montelukast (SINGULAIR) 10 MG tablet Take 1 tablet by mouth nightly 3/28/22   RICH Calvo CNP   ALLERGEN EXTRACT Inject as directed Twice a Week     Historical Provider, MD   meclizine (ANTIVERT) 25 MG tablet Take 1 tablet by mouth 4 times daily as needed for Dizziness 1/4/22   Marni Monae MD   ZINC PO Take by mouth daily     Historical Provider, MD   Fluticasone Propionate Lacintiaan Nova) 93 MCG/ACT EXHU 1 spray by Nasal route 2 times daily 9/29/21   RICH Calvo CNP   dupilumab (DUPIXENT) 300 MG/2ML SOSY injection Inject 2 mLs into the skin every 14 days 8/5/21   RICH Calvo CNP   chlorpheniramine (CHLOR-TRIMETON) 2 MG/5ML syrup Take 2 mg by mouth every 4 hours as needed for Allergies     Historical Provider, MD   EPINEPHrine (AUVI-Q) 0.3 MG/0.3ML SOAJ injection Dispense 2 packs of 2 (total 4 devices). Use as directed, STAT for allergic reaction.  7/15/21   RICH Calvo CNP   Multiple Vitamin (MULTI VITAMIN DAILY PO) Take by mouth daily     Historical Provider, MD   Ascorbic Acid (VITAMIN C PO) Take by mouth daily     Historical Provider, MD   VITAMIN D PO Take by mouth 2 times daily     Historical Provider, MD   Probiotic Product (PROBIOTIC PO) Take by mouth 1 tab in morning    Historical Provider, MD   b complex vitamins capsule Take 1 capsule by mouth daily     Historical Provider, MD   cetirizine (ZYRTEC) 10 MG tablet Take 10 mg by mouth nightly Not in last 10 days    Historical Provider, MD   Pseudoephedrine HCl (SUDAFED 12 HOUR PO) Take 1 tablet by mouth daily     Historical Provider, MD        Allergies   Larna Lass nut oil] and Seasonal    Social History     Social History     Tobacco History     Smoking Status  Never Smoker    Smokeless Tobacco Use  Never Used          Alcohol History     Alcohol Use Status  Yes Comment  occasinally          Drug Use     Drug Use Status  No          Sexual Activity     Sexually Active  Yes Partners  Male                Family History     Family History   Problem Relation Age of Onset   Rubin Snyder Breast Cancer Mother 52    Cancer Mother     Breast Cancer Maternal Grandmother 48    Cancer Maternal Grandmother     High Blood Pressure Maternal Grandmother     Stroke Maternal Grandmother     Diabetes Maternal Grandfather     Heart Disease Neg Hx        Review of Systems   Review of Systems  As above  Physical Exam   /86   Pulse 80   Temp 98.4 °F (36.9 °C) (Temporal)   Resp 16   Ht 5' 3\" (1.6 m)   Wt 184 lb (83.5 kg)   SpO2 97%   BMI 32.59 kg/m²     Physical Exam  The patient is alert cooperative otherwise well-appearing adult female in no acute distress  Her voice and her speech pattern are within normal limits for age and gender  She is breathing without labor  Labs    No results found for this or any previous visit (from the past 24 hour(s)). Imaging/Diagnostics Last 24 Hours   No results found. Assessment    Chronic rhinosinusitis    Plan   1. To the operating room for image guided surgery to treat focal residual disease    Consultations Ordered:  None    Electronically signed by Jordan Calixto MD on 5/5/22 at 1:31 PM EDT      Jordan Calixto MD   Physician   Specialty:  Otolaryngology   Progress Notes      Signed   Encounter Date:  4/26/2022                 Signed        Expand All Collapse All        Show:Clear all  [x]Manual[x]Template[]Copied    Added by:  [x]Arnaldo Olmos MD      []meenakshi for details        [address], NOSE AND THROAT  [address] [address]  Dept: [phone]  Dept Fax: [phone]  Loc: [phone]     Lashae Suarez is a 51 y.o. female who was referred by No ref.  provider found for:       Chief Complaint   Patient presents with    Follow-up       Patient is here for a follow up appointment after CT scan s/p Ethmoidectomy and rt nasal antral window 2/4/22          HPI:      Lashae Coleman is a 51 y.o. female with a history of chronic rhinosinusitis. She is status post multiple sinus procedures intended to enable her to drain her sinuses properly and be relieved of her chronic rhinosinusitis symptoms to a large degree. She still has some sensation of necrotic debris in her nose although it is becoming more rare. She still has the \"teapot sign\" of fluid accumulating in the right maxillary sinus. The patient is new CT scan to look for any signs of residual paranasal sinus disease. [facility]  History:           Allergies   Allergen Reactions    Seasonal        Current Facility-Administered Medications          Current Outpatient Medications   Medication Sig Dispense Refill    ketotifen (ZADITOR) 0.025 % ophthalmic solution One drop in each eye twice daily as needed 1 each 2    montelukast (SINGULAIR) 10 MG tablet Take 1 tablet by mouth nightly 90 tablet 0    ALLERGEN EXTRACT Twice a Week         DUPIXENT 300 MG/2ML SOPN injection          meclizine (ANTIVERT) 25 MG tablet Take 1 tablet by mouth 4 times daily as needed for Dizziness 40 tablet 1    ZINC PO Take by mouth         Fluticasone Propionate (XHANCE) 93 MCG/ACT EXHU 1 spray by Nasal route 2 times daily 6.3 mL 11    dupilumab (DUPIXENT) 300 MG/2ML SOSY injection Inject 2 mLs into the skin every 14 days 2 Syringe 11    chlorpheniramine (CHLOR-TRIMETON) 2 MG/5ML syrup Take 2 mg by mouth every 4 hours as needed for Allergies         EPINEPHrine (AUVI-Q) 0.3 MG/0.3ML SOAJ injection Dispense 2 packs of 2 (total 4 devices). Use as directed, STAT for allergic reaction.  2 each 0    Multiple Vitamin (MULTI VITAMIN DAILY PO) Take by mouth         Ascorbic Acid (VITAMIN C PO) Take by mouth         VITAMIN D PO Take by mouth         Probiotic Product (PROBIOTIC PO) Take by mouth 1 tab in morning        b complex vitamins capsule Take 1 capsule by mouth daily         cetirizine (ZYRTEC) 10 MG tablet Take 10 mg by mouth nightly Not in last 10 days        Pseudoephedrine HCl (SUDAFED 12 HOUR PO) Take 1 tablet by mouth daily           No current facility-administered medications for this visit.         Past Medical History   Past Medical History:   Diagnosis Date    Asthma      Panic attacks 4/98    PONV (postoperative nausea and vomiting)       Scopalamine patch    Seasonal allergies 4/99    Sinusitis, chronic           Past Surgical History         Past Surgical History:   Procedure Laterality Date    CYST REMOVAL        INTRAUTERINE DEVICE INSERTION         murena-  Out 2014    SINUS ENDOSCOPY   04/2021     dr Luz Maria Posadas Bilateral 04/30/2021     SINUS ENDOSCOPY FUNCTIONAL SURGERY IMAGE GUIDED, RIGHT MAXILLARY ANTROSTOMY WITH REMOVAL OF TISSUE FROM MAXILLARY SINUS, RIGHT PARTIAL ANTERIOR ETHMOIDECTOMY, NASAL ENDOSCOPY OF LEFT ABBEY BULLOSA performed by Larry Mckenzie MD at [address] [place] [address] Bilateral 2/4/2022     IMAGE GUIDED ENDOSCOPY SURGERY BILATERAL ETHMOIDECTOMY(OSSEOUS RIGHT ETHMOID AIR CELLS OPACIFIED LEFT ETHMOID AIR CELL) RIGHT NASAL ANTRAL WINDOW performed by Larry Mckenzie MD at [address] [place] [address]   02/04/2022     dr Sprague [facility]   07/08/2014     Dr Jose Ramon Albert; Removal IUD         Family History         Family History   Problem Relation Age of Onset    Breast Cancer Mother 52    Breast Cancer Maternal Grandmother 48         Social History            Tobacco Use    Smoking status: Never Smoker    Smokeless tobacco: Never Used   Substance Use Topics    Alcohol use:  Yes       Comment: occasinally                    Subjective:      Review of Systems  Rest of review of systems are negative, except as noted in HPI.      Objective:      /70 (Site: Left Upper Arm, Position: Sitting)   Pulse 80   Temp 97.2 °F (36.2 °C) (Infrared)   Resp 14   Ht 5' 3\" (1.6 m)   Wt 188 lb (85.3 kg)   SpO2 97%   BMI 33.30 kg/m²      Physical Exam      Vitals reviewed.     CT FACIAL BONES WO CONTRAST     Result Date: 4/26/2022   1. Postoperative changes involving the ethmoid air cells bilaterally, medial wall of the right maxillary sinus and nasal cavity. 2. There has been clearing of the sinusitis in the right maxillary sinus and diminution in the opacification of the left ethmoid air cells since previous study dated 24 January 2022. 3. The frontal and sphenoid sinuses are clear. 4. Minimal mucosal thickening in the left maxillary sinus. Narrowing of the ostium of the left maxillary sinus. 5. Minimal deviation of the nasal septum towards the left side. . **This report has been created using voice recognition software. It may contain minor errors which are inherent in voice recognition technology. ** Final report electronically signed by DR Olga Bond on 4/26/2022 10:41 AM           Lab Results   Component Value Date      03/05/2021     K 4.2 03/05/2021      03/05/2021     CO2 22 03/05/2021     BUN 8 03/05/2021     CREATININE 0.74 03/05/2021     CALCIUM 9.2 03/05/2021     LABALBU 4.2 03/05/2021     BILITOT 0.5 03/05/2021     ALKPHOS 78 03/05/2021     AST 22 03/05/2021     ALT 23 03/05/2021         All of the past medical history, past surgical history, family history,social history, allergies and current medications were reviewed with the patient. Assessment & Plan   Diagnoses and all orders for this visit:       Diagnosis Orders   1. Chronic ethmoiditis  CT NASAL/SINUS NDSC W/RMVL TISS FROM FRONTAL SINUS   2. Chronic frontal sinusitis  CT NASAL/SINUS NDSC W/RMVL TISS FROM FRONTAL SINUS         Based on the patient's history and physical findings as well as the results of her repeat sinus CT scan, she does have 2 areas of residual chronic sinusitis; 1 in the frontal recess of the right side and 1 in the anterior ethmoid sinuses on the left side.   In addition she has a raised area of the maxillary sinus on the right side that needs to be taken down further for the nasal antral window to drain the irrigation fluids and the fetid secretions out of the maxillary sinus so that it does not really thicken and become infested with intramucosal and submucosal bacterial disease. I reviewed with her the indications benefits limitations and risks of proceeding this manner to her satisfaction. She wishes to proceed as expeditiously as possible to give this a little chance to recur as she can. She reported understanding the risks involved and accepting them. We will proceed with her consent.     I spent over 30 minutes of face-to-face time with the patient the majority of which was dedicated to reviewing her complex history and planning this next phase of her surgical care.        Return in about 4 weeks (around 5/24/2022) for A 2-week follow-up with endoscopy and debridement.           **This report has been created using voice recognition software. It may contain minor errors which are inherent in voice recognition technology. **                                               Office Visit on 4/26/2022           Office Visit on 4/26/2022                Detailed Report            Note viewed by patient        Progress Notes Info    Author Note Status Last Update User Last Update Date/Time   Barbara Ochoa MD Signed Barbara Ochoa MD 4/26/2022  4:11 PM     Chart Review Routing History    No routing history on file.

## 2022-05-09 ENCOUNTER — NURSE ONLY (OUTPATIENT)
Dept: ALLERGY | Age: 47
End: 2022-05-09
Payer: COMMERCIAL

## 2022-05-09 VITALS
RESPIRATION RATE: 16 BRPM | DIASTOLIC BLOOD PRESSURE: 78 MMHG | OXYGEN SATURATION: 98 % | SYSTOLIC BLOOD PRESSURE: 120 MMHG | TEMPERATURE: 97.6 F | HEART RATE: 89 BPM

## 2022-05-09 DIAGNOSIS — Z51.6 ENCOUNTER FOR DESENSITIZATION TO ALLERGENS: Primary | ICD-10-CM

## 2022-05-09 DIAGNOSIS — J30.1 ALLERGY TO TREES: ICD-10-CM

## 2022-05-09 DIAGNOSIS — J30.81 ANIMAL DANDER ALLERGY: ICD-10-CM

## 2022-05-09 PROCEDURE — 95117 IMMUNOTHERAPY INJECTIONS: CPT | Performed by: NURSE PRACTITIONER

## 2022-05-09 NOTE — PROGRESS NOTES
After consent obtained/verified, allergy injection given in back of R/L arm(s). VIAL COLOR OF ALL VIALS TODAY IS green    ALLERGY INJECTION FROM VIAL A GIVEN left  UPPER ARM IN THE AMOUNT OF 0.40 ML    ALLERGY INJECTION FROM VIAL B GIVEN right upper ARM IN THE AMOUNT OF 0.40 ML    ALLERGY INJECTION FROM VIAL C GIVEN leftlower ARM IN THE AMOUNT OF 0.40 ML      Documentation of vial injection specific to arm(s) noted on Allergy Immunotherapy Administration Form. Patient waited 30 minutes for observation. No      Patient tolerated well without adverse reaction WHILE IN OFFICE    SHOT REACTION TREATMENT INSTRUCTIONS    During the 30 minute wait after an allergy injection the following symptoms should be reported:    Itching other than at the injection site  Hives or swelling other than at the injection site  Redness other than at the injection site  Difficulty breathing  Chest tightness  Difficulty swallowing  Throat tightness    If these symptoms occur, NOTIFY PROVIDER and the following treatment should be administered:    1. Epinephrine/Auvi Q 1:1000 IM - 0.3 ml if > 66 lbs or more, 0.15 ml if 33 - 63 lbs, or 0.1 ml if <33 lbs     2. Diphenhydramine - give all intramuscular:     2 to <6 years (off-label use): 6.25 mg,    6 to <12 years: 12.5 to 25 mg;    ?12 years: 25-50 mg.    3.  Famotidine:  Adults 40 mg oral    Adolescents age 12 years and >88 lbs: 40 mg    Children and Adolescents ? 12years of age: Initial: 0.25 mg/kg/dose  every 12 hours (maximum daily dose: 40 mg/day)    Epi/Auvi Q dose may me repeated in 5-15 minutes if adequate resolution of symptoms does not occur    Patient should be observed for at least one hour after final Epi/Auvi Q dose and must be seen by provider. Patients cannot drive themselves if they have received diphenhydramine.

## 2022-05-09 NOTE — ANESTHESIA POSTPROCEDURE EVALUATION
Department of Anesthesiology  Postprocedure Note    Patient: Braxton Flores  MRN: [id number]  YOB: 1975  Date of evaluation: 5/9/2022  Time:  8:20 AM     Procedure Summary     Date: 05/05/22 Room / Location: [facility]  / [place]    Anesthesia Start: 8441 Anesthesia Stop: 4110    Procedure: Image Guided Surgery for Right Frontal Sinus Exploration with Removal of Tissue, Right Nasal Antral Window Revision Left Ethmoidectomy Revision (Bilateral Nose) Diagnosis: (Chronic ethmoiditis Chronic frontal sinusitis)    Surgeons: Ximena Sheth MD Responsible Provider: Romina Robbins, DO    Anesthesia Type: general ASA Status: 2          Anesthesia Type: general    Yael Phase I: Yael Score: 9    Yael Phase II: Yael Score: 10    Last vitals: Reviewed and per EMR flowsheets.        Anesthesia Post Evaluation    Patient location during evaluation: PACU  Patient participation: complete - patient participated  Level of consciousness: awake  Airway patency: patent  Nausea & Vomiting: no nausea  Complications: no  Cardiovascular status: hemodynamically stable  Respiratory status: acceptable  Hydration status: stable

## 2022-05-12 ENCOUNTER — NURSE ONLY (OUTPATIENT)
Dept: ALLERGY | Age: 47
End: 2022-05-12
Payer: COMMERCIAL

## 2022-05-12 VITALS
SYSTOLIC BLOOD PRESSURE: 120 MMHG | RESPIRATION RATE: 20 BRPM | DIASTOLIC BLOOD PRESSURE: 80 MMHG | TEMPERATURE: 98.1 F | HEART RATE: 76 BPM | OXYGEN SATURATION: 97 %

## 2022-05-12 DIAGNOSIS — Z51.6 ENCOUNTER FOR DESENSITIZATION TO ALLERGENS: Primary | ICD-10-CM

## 2022-05-12 DIAGNOSIS — J30.81 ANIMAL DANDER ALLERGY: ICD-10-CM

## 2022-05-12 DIAGNOSIS — J30.1 ALLERGY TO TREES: ICD-10-CM

## 2022-05-12 PROCEDURE — 95117 IMMUNOTHERAPY INJECTIONS: CPT | Performed by: NURSE PRACTITIONER

## 2022-05-12 NOTE — PROGRESS NOTES
After consent obtained/verified, allergy injection given in back of R/L arm(s). VIAL COLOR OF ALL VIALS TODAY IS green    ALLERGY INJECTION FROM VIAL A GIVEN right  UPPER ARM IN THE AMOUNT OF 0.45 ML    ALLERGY INJECTION FROM VIAL B GIVEN left upper ARM IN THE AMOUNT OF 0.45 ML    ALLERGY INJECTION FROM VIAL C GIVEN leftlower ARM IN THE AMOUNT OF 0.45 ML      Documentation of vial injection specific to arm(s) noted on Allergy Immunotherapy Administration Form. Patient waited 30 minutes for observation. No      Patient tolerated well without adverse reaction WHILE IN OFFICE    SHOT REACTION TREATMENT INSTRUCTIONS    During the 30 minute wait after an allergy injection the following symptoms should be reported:    Itching other than at the injection site  Hives or swelling other than at the injection site  Redness other than at the injection site  Difficulty breathing  Chest tightness  Difficulty swallowing  Throat tightness    If these symptoms occur, NOTIFY PROVIDER and the following treatment should be administered:    1. Epinephrine/Auvi Q 1:1000 IM - 0.3 ml if > 66 lbs or more, 0.15 ml if 33 - 63 lbs, or 0.1 ml if <33 lbs     2. Diphenhydramine - give all intramuscular:     2 to <6 years (off-label use): 6.25 mg,    6 to <12 years: 12.5 to 25 mg;    ?12 years: 25-50 mg.    3.  Famotidine:  Adults 40 mg oral    Adolescents age 12 years and >88 lbs: 40 mg    Children and Adolescents ? 12years of age: Initial: 0.25 mg/kg/dose  every 12 hours (maximum daily dose: 40 mg/day)    Epi/Auvi Q dose may me repeated in 5-15 minutes if adequate resolution of symptoms does not occur    Patient should be observed for at least one hour after final Epi/Auvi Q dose and must be seen by provider. Patients cannot drive themselves if they have received diphenhydramine.

## 2022-05-16 ENCOUNTER — NURSE ONLY (OUTPATIENT)
Dept: ALLERGY | Age: 47
End: 2022-05-16
Payer: COMMERCIAL

## 2022-05-16 VITALS
DIASTOLIC BLOOD PRESSURE: 68 MMHG | RESPIRATION RATE: 18 BRPM | SYSTOLIC BLOOD PRESSURE: 120 MMHG | HEART RATE: 86 BPM | TEMPERATURE: 97.1 F | OXYGEN SATURATION: 98 %

## 2022-05-16 DIAGNOSIS — J30.1 ALLERGY TO TREES: ICD-10-CM

## 2022-05-16 DIAGNOSIS — Z51.6 ENCOUNTER FOR DESENSITIZATION TO ALLERGENS: Primary | ICD-10-CM

## 2022-05-16 DIAGNOSIS — Z91.048 ALLERGY TO MOLD: ICD-10-CM

## 2022-05-16 PROCEDURE — 95117 IMMUNOTHERAPY INJECTIONS: CPT | Performed by: NURSE PRACTITIONER

## 2022-05-16 NOTE — PROGRESS NOTES
After consent obtained/verified, allergy injection given in back of R/L arm(s). VIAL COLOR OF ALL VIALS TODAY IS green    ALLERGY INJECTION FROM VIAL A GIVEN left  UPPER ARM IN THE AMOUNT OF 0.50 ML    ALLERGY INJECTION FROM VIAL B GIVEN right lower ARM IN THE AMOUNT OF 0.50 ML    ALLERGY INJECTION FROM VIAL C GIVEN rightupper ARM IN THE AMOUNT OF 0.50 ML      Documentation of vial injection specific to arm(s) noted on Allergy Immunotherapy Administration Form. Patient waited 30 minutes for observation. No      Patient tolerated well without adverse reaction WHILE IN OFFICE    SHOT REACTION TREATMENT INSTRUCTIONS    During the 30 minute wait after an allergy injection the following symptoms should be reported:    Itching other than at the injection site  Hives or swelling other than at the injection site  Redness other than at the injection site  Difficulty breathing  Chest tightness  Difficulty swallowing  Throat tightness    If these symptoms occur, NOTIFY PROVIDER and the following treatment should be administered:    1. Epinephrine/Auvi Q 1:1000 IM - 0.3 ml if > 66 lbs or more, 0.15 ml if 33 - 63 lbs, or 0.1 ml if <33 lbs     2. Diphenhydramine - give all intramuscular:     2 to <6 years (off-label use): 6.25 mg,    6 to <12 years: 12.5 to 25 mg;    ?12 years: 25-50 mg.    3.  Famotidine:  Adults 40 mg oral    Adolescents age 12 years and >88 lbs: 40 mg    Children and Adolescents ? 12years of age: Initial: 0.25 mg/kg/dose  every 12 hours (maximum daily dose: 40 mg/day)    Epi/Auvi Q dose may me repeated in 5-15 minutes if adequate resolution of symptoms does not occur    Patient should be observed for at least one hour after final Epi/Auvi Q dose and must be seen by provider. Patients cannot drive themselves if they have received diphenhydramine.

## 2022-05-18 ENCOUNTER — OFFICE VISIT (OUTPATIENT)
Dept: ENT CLINIC | Age: 47
End: 2022-05-18
Payer: COMMERCIAL

## 2022-05-18 ENCOUNTER — NURSE ONLY (OUTPATIENT)
Dept: ALLERGY | Age: 47
End: 2022-05-18
Payer: COMMERCIAL

## 2022-05-18 VITALS
DIASTOLIC BLOOD PRESSURE: 82 MMHG | RESPIRATION RATE: 16 BRPM | WEIGHT: 186.4 LBS | SYSTOLIC BLOOD PRESSURE: 120 MMHG | TEMPERATURE: 97.1 F | BODY MASS INDEX: 33.03 KG/M2 | HEIGHT: 63 IN | HEART RATE: 87 BPM | OXYGEN SATURATION: 97 %

## 2022-05-18 VITALS
TEMPERATURE: 97.1 F | RESPIRATION RATE: 16 BRPM | OXYGEN SATURATION: 97 % | DIASTOLIC BLOOD PRESSURE: 82 MMHG | HEART RATE: 87 BPM | SYSTOLIC BLOOD PRESSURE: 120 MMHG

## 2022-05-18 DIAGNOSIS — J32.2 CHRONIC ETHMOIDITIS: Primary | ICD-10-CM

## 2022-05-18 DIAGNOSIS — Z98.890 STATUS POST FUNCTIONAL ENDOSCOPIC SINUS SURGERY (FESS): ICD-10-CM

## 2022-05-18 DIAGNOSIS — J30.1 ALLERGY TO TREES: ICD-10-CM

## 2022-05-18 DIAGNOSIS — J32.0 CHRONIC MAXILLARY SINUSITIS: ICD-10-CM

## 2022-05-18 DIAGNOSIS — G47.33 OBSTRUCTIVE SLEEP APNEA: ICD-10-CM

## 2022-05-18 DIAGNOSIS — Z51.6 ENCOUNTER FOR DESENSITIZATION TO ALLERGENS: Primary | ICD-10-CM

## 2022-05-18 DIAGNOSIS — Z91.048 ALLERGY TO MOLD: ICD-10-CM

## 2022-05-18 PROCEDURE — 99024 POSTOP FOLLOW-UP VISIT: CPT | Performed by: OTOLARYNGOLOGY

## 2022-05-18 PROCEDURE — 95117 IMMUNOTHERAPY INJECTIONS: CPT | Performed by: NURSE PRACTITIONER

## 2022-05-18 PROCEDURE — 31237 NSL/SINS NDSC SURG BX POLYPC: CPT | Performed by: OTOLARYNGOLOGY

## 2022-05-18 NOTE — PROGRESS NOTES
After consent obtained/verified, allergy injection given in back of R/L arm(s). VIAL COLOR OF ALL VIALS TODAY IS blue    ALLERGY INJECTION FROM VIAL A GIVEN right  UPPER ARM IN THE AMOUNT OF 0.05 ML    ALLERGY INJECTION FROM VIAL B GIVEN left lower ARM IN THE AMOUNT OF 0.05 ML    ALLERGY INJECTION FROM VIAL C GIVEN leftupper ARM IN THE AMOUNT OF 0.05 ML      Documentation of vial injection specific to arm(s) noted on Allergy Immunotherapy Administration Form. Patient waited 30 minutes for observation. Yes      Patient tolerated well without adverse reaction WHILE IN OFFICE    SHOT REACTION TREATMENT INSTRUCTIONS    During the 30 minute wait after an allergy injection the following symptoms should be reported:    Itching other than at the injection site  Hives or swelling other than at the injection site  Redness other than at the injection site  Difficulty breathing  Chest tightness  Difficulty swallowing  Throat tightness    If these symptoms occur, NOTIFY PROVIDER and the following treatment should be administered:    1. Epinephrine/Auvi Q 1:1000 IM - 0.3 ml if > 66 lbs or more, 0.15 ml if 33 - 63 lbs, or 0.1 ml if <33 lbs     2. Diphenhydramine - give all intramuscular:     2 to <6 years (off-label use): 6.25 mg,    6 to <12 years: 12.5 to 25 mg;    ?12 years: 25-50 mg.    3.  Famotidine:  Adults 40 mg oral    Adolescents age 12 years and >88 lbs: 40 mg    Children and Adolescents ? 12years of age: Initial: 0.25 mg/kg/dose  every 12 hours (maximum daily dose: 40 mg/day)    Epi/Auvi Q dose may me repeated in 5-15 minutes if adequate resolution of symptoms does not occur    Patient should be observed for at least one hour after final Epi/Auvi Q dose and must be seen by provider. Patients cannot drive themselves if they have received diphenhydramine.

## 2022-05-18 NOTE — PROGRESS NOTES
[facility]YS[facility] [place] [facility]  [facility] EAR, NOSE AND THROAT  [facility]  Dept: [phone]  Dept Fax: [phone]  Loc: [phone]    Lashae Agosto is a 49 y.o. female who was referred by No ref. provider found for:  Chief Complaint   Patient presents with   [facility] Post-Op Check     patient is here for post op right frontal, NA window, Left ethmoid 5/5/22       HPI:     Lashae Agosto is a 49 y.o. female returns for her first postop follow-up with nasal endoscopy and debridement after a revision resection of residual nasal air cells and revision nasal antral window. The patient reports having had more postsurgical malaise and difficulty functioning than she recalls having from her prior surgeries. Overall she is been able to clean out her nose relatively easily however and states that she has no fetid odor within her nose. She is complaining of definitive anesthesia of her to right maxillary incisors which she did not have preoperatively. History:      Allergies   Allergen Reactions    Hercules [Macadamia Nut Oil]      Almonds per allergy testing    Seasonal      Current Outpatient Medications   Medication Sig Dispense Refill    ketotifen (ZADITOR) 0.025 % ophthalmic solution One drop in each eye twice daily as needed 1 each 2    montelukast (SINGULAIR) 10 MG tablet Take 1 tablet by mouth nightly 90 tablet 0    ALLERGEN EXTRACT Inject as directed Twice a Week       meclizine (ANTIVERT) 25 MG tablet Take 1 tablet by mouth 4 times daily as needed for Dizziness 40 tablet 1    ZINC PO Take by mouth daily       Fluticasone Propionate (XHANCE) 93 MCG/ACT EXHU 1 spray by Nasal route 2 times daily 6.3 mL 11    dupilumab (DUPIXENT) 300 MG/2ML SOSY injection Inject 2 mLs into the skin every 14 days 2 Syringe 11    chlorpheniramine (CHLOR-TRIMETON) 2 MG/5ML syrup Take 2 mg by mouth every 4 hours as needed for Allergies       EPINEPHrine (AUVI-Q) 0.3 MG/0.3ML SOAJ injection Dispense 2 packs of 2 (total 4 devices). Use as directed, STAT for allergic reaction. 2 each 0    Multiple Vitamin (MULTI VITAMIN DAILY PO) Take by mouth daily       Ascorbic Acid (VITAMIN C PO) Take by mouth daily       VITAMIN D PO Take by mouth 2 times daily       Probiotic Product (PROBIOTIC PO) Take by mouth 1 tab in morning      b complex vitamins capsule Take 1 capsule by mouth daily       cetirizine (ZYRTEC) 10 MG tablet Take 10 mg by mouth nightly Not in last 10 days      Pseudoephedrine HCl (SUDAFED 12 HOUR PO) Take 1 tablet by mouth daily        No current facility-administered medications for this visit.      Past Medical History:   Diagnosis Date    Asthma     COVID 01/09/2022    Panic attacks 4/98    PONV (postoperative nausea and vomiting)     Scopalamine patch    Seasonal allergies 4/99    Sinusitis, chronic       Past Surgical History:   Procedure Laterality Date    CYST REMOVAL      INTRAUTERINE DEVICE INSERTION      murena-  Out 2014    SINUS ENDOSCOPY  04/2021    dr Tanisha Rojas Bilateral 04/30/2021    SINUS ENDOSCOPY FUNCTIONAL SURGERY IMAGE GUIDED, RIGHT MAXILLARY ANTROSTOMY WITH REMOVAL OF TISSUE FROM MAXILLARY SINUS, RIGHT PARTIAL ANTERIOR ETHMOIDECTOMY, NASAL ENDOSCOPY OF LEFT ABBEY BULLOSA performed by Subha Zavaleta MD at [address] [place] [address] Bilateral 2/4/2022    IMAGE GUIDED ENDOSCOPY SURGERY BILATERAL ETHMOIDECTOMY(OSSEOUS RIGHT ETHMOID AIR CELLS OPACIFIED LEFT ETHMOID AIR CELL) RIGHT NASAL ANTRAL WINDOW performed by Subha Zavaleta MD at [address] [place] [address] Bilateral 5/5/2022    Image Guided Surgery for Right Frontal Sinus Exploration with Removal of Tissue, Right Nasal Antral Window Revision Left Ethmoidectomy Revision performed by Subha Zavaleta MD at [address] [place] [address]  02/04/2022    dr Raymond Lin  07/08/2014    Dr Polina Brown; Removal IUD     Family History   Problem Relation Age of Onset    Breast Cancer Mother 52    Cancer Mother     Breast Cancer Maternal Grandmother 48    Cancer Maternal Grandmother     High Blood Pressure Maternal Grandmother     Stroke Maternal Grandmother     Diabetes Maternal Grandfather     Heart Disease Neg Hx      Social History     Tobacco Use    Smoking status: Never Smoker    Smokeless tobacco: Never Used   Substance Use Topics    Alcohol use: Yes     Comment: occasinally        Subjective:      Review of Systems  Rest of review of systems are negative, except as noted in HPI. Objective:     /82 (Site: Left Upper Arm, Position: Sitting)   Pulse 87   Temp 97.1 °F (36.2 °C) (Infrared)   Resp 16   Ht 5' 3\" (1.6 m)   Wt 186 lb 6.4 oz (84.6 kg)   SpO2 97%   BMI 33.02 kg/m²     Physical Exam       On general physical exam the patient is pleasant alert cooperative middle-aged female in no acute distress. Her voice and her speech pattern are within normal limits for her age and gender. She was neither hyper nor hyponasal.  No signs of recent bleeding could be seen about her nose. Procedure: Right nasal endoscopy with debridement  Findings: The patient's nasal airway had a moderate amount of crusting and soft tissue debris within it. I used suction and Blakesley grasping forceps to remove it all from the nasal antral window as well as from the high nasal vault of the completion ethmoidectomy that had occurred. Patient tolerated the debridement well. Soft tissues beyond the soft tissue removal were healthy in appearance. Bleeding was self-limited. The patient tolerated the procedure well. Nasal endoscopy images:                                Vitals reviewed. CT FACIAL BONES WO CONTRAST    Result Date: 4/26/2022   1. Postoperative changes involving the ethmoid air cells bilaterally, medial wall of the right maxillary sinus and nasal cavity.  2. There has been clearing of the sinusitis in the right maxillary sinus and diminution in the opacification of the left ethmoid air cells since previous study dated 24 January 2022. 3. The frontal and sphenoid sinuses are clear. 4. Minimal mucosal thickening in the left maxillary sinus. Narrowing of the ostium of the left maxillary sinus. 5. Minimal deviation of the nasal septum towards the left side. . **This report has been created using voice recognition software. It may contain minor errors which are inherent in voice recognition technology. ** Final report electronically signed by DR Fredi Rodriguez on 4/26/2022 10:41 AM     Lab Results   Component Value Date     03/05/2021    K 4.2 03/05/2021     03/05/2021    CO2 22 03/05/2021    BUN 8 03/05/2021    CREATININE 0.74 03/05/2021    CALCIUM 9.2 03/05/2021    LABALBU 4.2 03/05/2021    BILITOT 0.5 03/05/2021    ALKPHOS 78 03/05/2021    AST 22 03/05/2021    ALT 23 03/05/2021       All of the past medical history, past surgical history, family history,social history, allergies and current medications were reviewed with the patient. Assessment & Plan   Diagnoses and all orders for this visit:     Diagnosis Orders   1. Chronic ethmoiditis     2. Chronic maxillary sinusitis     3. Status post functional endoscopic sinus surgery (FESS)     4. Obstructive sleep apnea           Is on the patient's history and these physical findings on her nasal endoscopy with debridement today, the patient likely will have an excellent result to meet her needs for healthy paranasal sinus tissue and to avoid the \"teapot\" phenomenon of nasal irrigation draining out through her nose in an expected circumstances. I recommended she continue twice daily irrigations to keep crusting from forming as the last mucous membrane wounds fully healed. I will see her back in approximately 2 months for an anterior inspection and possible nasal endoscopy if she needs it.   She reported being very pleased with the outcome of her care thus far and be willing to proceed as such. Return in about 3 months (around 8/18/2022) for Follow-up evaluation. **This report has been created using voice recognition software. It may contain minor errors which are inherent in voice recognition technology. **

## 2022-05-23 ENCOUNTER — NURSE ONLY (OUTPATIENT)
Dept: ALLERGY | Age: 47
End: 2022-05-23
Payer: COMMERCIAL

## 2022-05-23 VITALS
HEART RATE: 78 BPM | TEMPERATURE: 96.3 F | SYSTOLIC BLOOD PRESSURE: 114 MMHG | OXYGEN SATURATION: 97 % | DIASTOLIC BLOOD PRESSURE: 70 MMHG | RESPIRATION RATE: 14 BRPM

## 2022-05-23 DIAGNOSIS — J30.1 ALLERGY TO TREES: ICD-10-CM

## 2022-05-23 DIAGNOSIS — Z51.6 ENCOUNTER FOR DESENSITIZATION TO ALLERGENS: Primary | ICD-10-CM

## 2022-05-23 PROCEDURE — 95117 IMMUNOTHERAPY INJECTIONS: CPT | Performed by: NURSE PRACTITIONER

## 2022-05-23 NOTE — PROGRESS NOTES
After consent obtained/verified, allergy injection given in back of R/L arm(s). VIAL COLOR OF ALL VIALS TODAY IS BLUE    ALLERGY INJECTION FROM VIAL A GIVEN left  UPPER ARM IN THE AMOUNT OF 0.10 ML    ALLERGY INJECTION FROM VIAL B GIVEN right upper ARM IN THE AMOUNT OF 0.10 ML    ALLERGY INJECTION FROM VIAL C GIVEN right lower ARM IN THE AMOUNT OF 0.10 ML      Documentation of vial injection specific to arm(s) noted on Allergy Immunotherapy Administration Form. Patient waited 30 minutes for observation. No          SHOT REACTION TREATMENT INSTRUCTIONS    During the 30 minute wait after an allergy injection the following symptoms should be reported:    Itching other than at the injection site  Hives or swelling other than at the injection site  Redness other than at the injection site  Difficulty breathing  Chest tightness  Difficulty swallowing  Throat tightness    If these symptoms occur, NOTIFY PROVIDER and the following treatment should be administered:    1. Epinephrine/Auvi Q 1:1000 IM - 0.3 ml if > 66 lbs or more, 0.15 ml if 33 - 63 lbs, or 0.1 ml if <33 lbs     2. Diphenhydramine - give all intramuscular:     2 to <6 years (off-label use): 6.25 mg,    6 to <12 years: 12.5 to 25 mg;    ?12 years: 25-50 mg.    3.  Famotidine:  Adults 40 mg oral    Adolescents age 12 years and >88 lbs: 40 mg    Children and Adolescents ? 12years of age: Initial: 0.25 mg/kg/dose  every 12 hours (maximum daily dose: 40 mg/day)    Epi/Auvi Q dose may me repeated in 5-15 minutes if adequate resolution of symptoms does not occur    Patient should be observed for at least one hour after final Epi/Auvi Q dose and must be seen by provider. Patients cannot drive themselves if they have received diphenhydramine.

## 2022-05-26 ENCOUNTER — TELEPHONE (OUTPATIENT)
Dept: ENT CLINIC | Age: 47
End: 2022-05-26

## 2022-05-26 ENCOUNTER — NURSE ONLY (OUTPATIENT)
Dept: ALLERGY | Age: 47
End: 2022-05-26
Payer: COMMERCIAL

## 2022-05-26 VITALS
DIASTOLIC BLOOD PRESSURE: 70 MMHG | SYSTOLIC BLOOD PRESSURE: 122 MMHG | HEART RATE: 95 BPM | RESPIRATION RATE: 16 BRPM | TEMPERATURE: 97.1 F | OXYGEN SATURATION: 98 %

## 2022-05-26 DIAGNOSIS — Z91.048 ALLERGY TO MOLD: ICD-10-CM

## 2022-05-26 DIAGNOSIS — J30.1 ALLERGY TO TREES: ICD-10-CM

## 2022-05-26 DIAGNOSIS — Z51.6 ENCOUNTER FOR DESENSITIZATION TO ALLERGENS: Primary | ICD-10-CM

## 2022-05-26 PROCEDURE — 95117 IMMUNOTHERAPY INJECTIONS: CPT | Performed by: NURSE PRACTITIONER

## 2022-05-26 RX ORDER — SULFAMETHOXAZOLE AND TRIMETHOPRIM 800; 160 MG/1; MG/1
1 TABLET ORAL 2 TIMES DAILY
Qty: 20 TABLET | Refills: 0 | Status: SHIPPED | OUTPATIENT
Start: 2022-05-26 | End: 2022-06-05

## 2022-05-26 NOTE — PROGRESS NOTES
After consent obtained/verified, allergy injection given in back of R/L arm(s). VIAL COLOR OF ALL VIALS TODAY IS blue    ALLERGY INJECTION FROM VIAL A GIVEN right  UPPER ARM IN THE AMOUNT OF 0.15 ML    ALLERGY INJECTION FROM VIAL B GIVEN left upper ARM IN THE AMOUNT OF 0.15 ML    ALLERGY INJECTION FROM VIAL C GIVEN leftlower ARM IN THE AMOUNT OF 0.15 ML      Documentation of vial injection specific to arm(s) noted on Allergy Immunotherapy Administration Form. Patient waited 30 minutes for observation. No      Patient tolerated well without adverse reaction WHILE IN OFFICE    SHOT REACTION TREATMENT INSTRUCTIONS    During the 30 minute wait after an allergy injection the following symptoms should be reported:    Itching other than at the injection site  Hives or swelling other than at the injection site  Redness other than at the injection site  Difficulty breathing  Chest tightness  Difficulty swallowing  Throat tightness    If these symptoms occur, NOTIFY PROVIDER and the following treatment should be administered:    1. Epinephrine/Auvi Q 1:1000 IM - 0.3 ml if > 66 lbs or more, 0.15 ml if 33 - 63 lbs, or 0.1 ml if <33 lbs     2. Diphenhydramine - give all intramuscular:     2 to <6 years (off-label use): 6.25 mg,    6 to <12 years: 12.5 to 25 mg;    ?12 years: 25-50 mg.    3.  Famotidine:  Adults 40 mg oral    Adolescents age 12 years and >88 lbs: 40 mg    Children and Adolescents ? 12years of age: Initial: 0.25 mg/kg/dose  every 12 hours (maximum daily dose: 40 mg/day)    Epi/Auvi Q dose may me repeated in 5-15 minutes if adequate resolution of symptoms does not occur    Patient should be observed for at least one hour after final Epi/Auvi Q dose and must be seen by provider. Patients cannot drive themselves if they have received diphenhydramine.

## 2022-05-26 NOTE — TELEPHONE ENCOUNTER
Called pt and left a message on what HOLLY Quiñonez instructed to do and also antibiotic sent in to her pharmacy.

## 2022-05-26 NOTE — TELEPHONE ENCOUNTER
Pt was here for allergy injections. Pt states she had surgery with Dr Sharon Bull on 5/5/22 for Guided Surgery for Right Frontal Sinus Exploration with Removal of Tissue, Right Nasal Antral Window Revision Left Ethmoidectomy Revision. Pt reports the last 3 days she has been having yellow and green drainage out of her right nostril when she blows her nose or rinses. Pt reports it has been clear drainage after rinsing before 3 days ago. Pt states she can also see yellow and green when upon looking up her nose in the mirror. Pt states something \"similair\" happened after her last surgery in 4/30/21 and was on antibiotics. Pt states has a culture last surgery when this similar issue happened and she had a staph infection. Please advise.

## 2022-05-26 NOTE — TELEPHONE ENCOUNTER
I recommend putting her on a course of Bactrim DS based upon her symptoms and last culture. Rx sent. Lots of saline rinsing.

## 2022-06-01 ENCOUNTER — NURSE ONLY (OUTPATIENT)
Dept: ALLERGY | Age: 47
End: 2022-06-01
Payer: COMMERCIAL

## 2022-06-01 VITALS
HEART RATE: 84 BPM | TEMPERATURE: 98.7 F | RESPIRATION RATE: 16 BRPM | SYSTOLIC BLOOD PRESSURE: 122 MMHG | OXYGEN SATURATION: 97 % | DIASTOLIC BLOOD PRESSURE: 80 MMHG

## 2022-06-01 DIAGNOSIS — Z51.6 ENCOUNTER FOR DESENSITIZATION TO ALLERGENS: ICD-10-CM

## 2022-06-01 DIAGNOSIS — J30.1 ALLERGY TO TREES: Primary | ICD-10-CM

## 2022-06-01 DIAGNOSIS — Z91.048 ALLERGY TO MOLD: ICD-10-CM

## 2022-06-01 PROCEDURE — 95117 IMMUNOTHERAPY INJECTIONS: CPT | Performed by: NURSE PRACTITIONER

## 2022-06-01 NOTE — PROGRESS NOTES
After consent obtained/verified, allergy injection given in back of R/L arm(s). VIAL COLOR OF ALL VIALS TODAY IS Blue    ALLERGY INJECTION FROM VIAL A GIVEN left  UPPER ARM IN THE AMOUNT OF 0.20 ML    ALLERGY INJECTION FROM VIAL B GIVEN right lower ARM IN THE AMOUNT OF 0.20 ML    ALLERGY INJECTION FROM VIAL C GIVEN rightupper ARM IN THE AMOUNT OF 0.20 ML      Documentation of vial injection specific to arm(s) noted on Allergy Immunotherapy Administration Form. Patient waited 30 minutes for observation. No      Patient tolerated well without adverse reaction WHILE IN OFFICE    SHOT REACTION TREATMENT INSTRUCTIONS    During the 30 minute wait after an allergy injection the following symptoms should be reported:    Itching other than at the injection site  Hives or swelling other than at the injection site  Redness other than at the injection site  Difficulty breathing  Chest tightness  Difficulty swallowing  Throat tightness    If these symptoms occur, NOTIFY PROVIDER and the following treatment should be administered:    1. Epinephrine/Auvi Q 1:1000 IM - 0.3 ml if > 66 lbs or more, 0.15 ml if 33 - 63 lbs, or 0.1 ml if <33 lbs     2. Diphenhydramine - give all intramuscular:     2 to <6 years (off-label use): 6.25 mg,    6 to <12 years: 12.5 to 25 mg;    ?12 years: 25-50 mg.    3.  Famotidine:  Adults 40 mg oral    Adolescents age 12 years and >88 lbs: 40 mg    Children and Adolescents ? 12years of age: Initial: 0.25 mg/kg/dose  every 12 hours (maximum daily dose: 40 mg/day)    Epi/Auvi Q dose may me repeated in 5-15 minutes if adequate resolution of symptoms does not occur    Patient should be observed for at least one hour after final Epi/Auvi Q dose and must be seen by provider. Patients cannot drive themselves if they have received diphenhydramine.

## 2022-06-13 ENCOUNTER — NURSE ONLY (OUTPATIENT)
Dept: ALLERGY | Age: 47
End: 2022-06-13
Payer: COMMERCIAL

## 2022-06-13 VITALS
HEART RATE: 77 BPM | SYSTOLIC BLOOD PRESSURE: 118 MMHG | RESPIRATION RATE: 16 BRPM | OXYGEN SATURATION: 98 % | DIASTOLIC BLOOD PRESSURE: 72 MMHG | TEMPERATURE: 97.3 F

## 2022-06-13 DIAGNOSIS — Z51.6 ENCOUNTER FOR DESENSITIZATION TO ALLERGENS: ICD-10-CM

## 2022-06-13 DIAGNOSIS — J30.1 ALLERGY TO TREES: ICD-10-CM

## 2022-06-13 DIAGNOSIS — J30.9 CHRONIC ALLERGIC RHINITIS: Primary | ICD-10-CM

## 2022-06-13 DIAGNOSIS — Z91.048 ALLERGY TO MOLD: ICD-10-CM

## 2022-06-13 PROCEDURE — 95117 IMMUNOTHERAPY INJECTIONS: CPT | Performed by: NURSE PRACTITIONER

## 2022-06-13 RX ORDER — MONTELUKAST SODIUM 10 MG/1
10 TABLET ORAL NIGHTLY
Qty: 90 TABLET | Refills: 0 | OUTPATIENT
Start: 2022-06-13

## 2022-06-13 RX ORDER — MONTELUKAST SODIUM 10 MG/1
10 TABLET ORAL NIGHTLY
Qty: 90 TABLET | Refills: 0 | Status: CANCELLED | OUTPATIENT
Start: 2022-06-13

## 2022-06-15 RX ORDER — MONTELUKAST SODIUM 10 MG/1
10 TABLET ORAL NIGHTLY
Qty: 90 TABLET | Refills: 1 | Status: SHIPPED | OUTPATIENT
Start: 2022-06-15 | End: 2022-09-28 | Stop reason: SDUPTHER

## 2022-06-16 ENCOUNTER — NURSE ONLY (OUTPATIENT)
Dept: ALLERGY | Age: 47
End: 2022-06-16
Payer: COMMERCIAL

## 2022-06-16 VITALS
RESPIRATION RATE: 16 BRPM | HEART RATE: 87 BPM | SYSTOLIC BLOOD PRESSURE: 126 MMHG | OXYGEN SATURATION: 97 % | DIASTOLIC BLOOD PRESSURE: 76 MMHG | TEMPERATURE: 98 F

## 2022-06-16 DIAGNOSIS — Z51.6 ENCOUNTER FOR DESENSITIZATION TO ALLERGENS: Primary | ICD-10-CM

## 2022-06-16 DIAGNOSIS — Z91.048 ALLERGY TO MOLD: ICD-10-CM

## 2022-06-16 DIAGNOSIS — J30.81 ANIMAL DANDER ALLERGY: ICD-10-CM

## 2022-06-16 PROCEDURE — 95117 IMMUNOTHERAPY INJECTIONS: CPT | Performed by: NURSE PRACTITIONER

## 2022-06-16 NOTE — PROGRESS NOTES
After consent obtained/verified, allergy injection given in back of R/L arm(s). VIAL COLOR OF ALL VIALS TODAY IS blue    ALLERGY INJECTION FROM VIAL A GIVEN left  UPPER ARM IN THE AMOUNT OF 0.25 ML    ALLERGY INJECTION FROM VIAL B GIVEN right upper ARM IN THE AMOUNT OF 0.25 ML    ALLERGY INJECTION FROM VIAL C GIVEN rightlower ARM IN THE AMOUNT OF 0.25 ML      Documentation of vial injection specific to arm(s) noted on Allergy Immunotherapy Administration Form. Patient waited 30 minutes for observation. No      Patient tolerated well without adverse reaction WHILE IN OFFICE    SHOT REACTION TREATMENT INSTRUCTIONS    During the 30 minute wait after an allergy injection the following symptoms should be reported:    Itching other than at the injection site  Hives or swelling other than at the injection site  Redness other than at the injection site  Difficulty breathing  Chest tightness  Difficulty swallowing  Throat tightness    If these symptoms occur, NOTIFY PROVIDER and the following treatment should be administered:    1. Epinephrine/Auvi Q 1:1000 IM - 0.3 ml if > 66 lbs or more, 0.15 ml if 33 - 63 lbs, or 0.1 ml if <33 lbs     2. Diphenhydramine - give all intramuscular:     2 to <6 years (off-label use): 6.25 mg,    6 to <12 years: 12.5 to 25 mg;    ?12 years: 25-50 mg.    3.  Famotidine:  Adults 40 mg oral    Adolescents age 12 years and >88 lbs: 40 mg    Children and Adolescents ? 12years of age: Initial: 0.25 mg/kg/dose  every 12 hours (maximum daily dose: 40 mg/day)    Epi/Auvi Q dose may me repeated in 5-15 minutes if adequate resolution of symptoms does not occur    Patient should be observed for at least one hour after final Epi/Auvi Q dose and must be seen by provider. Patients cannot drive themselves if they have received diphenhydramine.

## 2022-06-20 ENCOUNTER — NURSE ONLY (OUTPATIENT)
Dept: ALLERGY | Age: 47
End: 2022-06-20
Payer: COMMERCIAL

## 2022-06-20 VITALS
SYSTOLIC BLOOD PRESSURE: 118 MMHG | OXYGEN SATURATION: 98 % | HEART RATE: 72 BPM | RESPIRATION RATE: 18 BRPM | TEMPERATURE: 97 F | DIASTOLIC BLOOD PRESSURE: 62 MMHG

## 2022-06-20 DIAGNOSIS — J30.81 ANIMAL DANDER ALLERGY: ICD-10-CM

## 2022-06-20 DIAGNOSIS — Z91.048 ALLERGY TO MOLD: ICD-10-CM

## 2022-06-20 DIAGNOSIS — Z51.6 ENCOUNTER FOR DESENSITIZATION TO ALLERGENS: Primary | ICD-10-CM

## 2022-06-20 PROCEDURE — 95117 IMMUNOTHERAPY INJECTIONS: CPT | Performed by: NURSE PRACTITIONER

## 2022-06-20 NOTE — PROGRESS NOTES
After consent obtained/verified, allergy injection given in back of R/L arm(s). VIAL COLOR OF ALL VIALS TODAY IS blue    ALLERGY INJECTION FROM VIAL A GIVEN right  UPPER ARM IN THE AMOUNT OF 0.30 ML    ALLERGY INJECTION FROM VIAL B GIVEN left upper ARM IN THE AMOUNT OF 0.30 ML    ALLERGY INJECTION FROM VIAL C GIVEN leftlower ARM IN THE AMOUNT OF 0.30 ML      Documentation of vial injection specific to arm(s) noted on Allergy Immunotherapy Administration Form. Patient waited 30 minutes for observation. No      Patient tolerated well without adverse reaction WHILE IN OFFICE    SHOT REACTION TREATMENT INSTRUCTIONS    During the 30 minute wait after an allergy injection the following symptoms should be reported:    Itching other than at the injection site  Hives or swelling other than at the injection site  Redness other than at the injection site  Difficulty breathing  Chest tightness  Difficulty swallowing  Throat tightness    If these symptoms occur, NOTIFY PROVIDER and the following treatment should be administered:    1. Epinephrine/Auvi Q 1:1000 IM - 0.3 ml if > 66 lbs or more, 0.15 ml if 33 - 63 lbs, or 0.1 ml if <33 lbs     2. Diphenhydramine - give all intramuscular:     2 to <6 years (off-label use): 6.25 mg,    6 to <12 years: 12.5 to 25 mg;    ?12 years: 25-50 mg.    3.  Famotidine:  Adults 40 mg oral    Adolescents age 12 years and >88 lbs: 40 mg    Children and Adolescents ? 12years of age: Initial: 0.25 mg/kg/dose  every 12 hours (maximum daily dose: 40 mg/day)    Epi/Auvi Q dose may me repeated in 5-15 minutes if adequate resolution of symptoms does not occur    Patient should be observed for at least one hour after final Epi/Auvi Q dose and must be seen by provider. Patients cannot drive themselves if they have received diphenhydramine.

## 2022-06-23 ENCOUNTER — NURSE ONLY (OUTPATIENT)
Dept: ALLERGY | Age: 47
End: 2022-06-23
Payer: COMMERCIAL

## 2022-06-23 VITALS
DIASTOLIC BLOOD PRESSURE: 70 MMHG | TEMPERATURE: 99.1 F | HEART RATE: 84 BPM | OXYGEN SATURATION: 96 % | SYSTOLIC BLOOD PRESSURE: 114 MMHG

## 2022-06-23 DIAGNOSIS — Z51.6 ENCOUNTER FOR DESENSITIZATION TO ALLERGENS: Primary | ICD-10-CM

## 2022-06-23 DIAGNOSIS — Z91.048 ALLERGY TO MOLD: ICD-10-CM

## 2022-06-23 DIAGNOSIS — J30.81 ANIMAL DANDER ALLERGY: ICD-10-CM

## 2022-06-23 PROCEDURE — 95117 IMMUNOTHERAPY INJECTIONS: CPT | Performed by: NURSE PRACTITIONER

## 2022-06-23 NOTE — PROGRESS NOTES
After consent obtained/verified, allergy injection given in back of R/L arm(s). VIAL COLOR OF ALL VIALS TODAY IS BLUE    ALLERGY INJECTION FROM VIAL A GIVEN left  UPPER ARM IN THE AMOUNT OF 0.35 ML    ALLERGY INJECTION FROM VIAL B GIVEN right lower ARM IN THE AMOUNT OF 0.35 ML    ALLERGY INJECTION FROM VIAL C GIVEN right upper ARM IN THE AMOUNT OF 0.35 ML      Documentation of vial injection specific to arm(s) noted on Allergy Immunotherapy Administration Form. Patient waited 30 minutes for observation. No          SHOT REACTION TREATMENT INSTRUCTIONS    During the 30 minute wait after an allergy injection the following symptoms should be reported:    Itching other than at the injection site  Hives or swelling other than at the injection site  Redness other than at the injection site  Difficulty breathing  Chest tightness  Difficulty swallowing  Throat tightness    If these symptoms occur, NOTIFY PROVIDER and the following treatment should be administered:    1. Epinephrine/Auvi Q 1:1000 IM - 0.3 ml if > 66 lbs or more, 0.15 ml if 33 - 63 lbs, or 0.1 ml if <33 lbs     2. Diphenhydramine - give all intramuscular:     2 to <6 years (off-label use): 6.25 mg,    6 to <12 years: 12.5 to 25 mg;    ?12 years: 25-50 mg.    3.  Famotidine:  Adults 40 mg oral    Adolescents age 12 years and >88 lbs: 40 mg    Children and Adolescents ? 12years of age: Initial: 0.25 mg/kg/dose  every 12 hours (maximum daily dose: 40 mg/day)    Epi/Auvi Q dose may me repeated in 5-15 minutes if adequate resolution of symptoms does not occur    Patient should be observed for at least one hour after final Epi/Auvi Q dose and must be seen by provider. Patients cannot drive themselves if they have received diphenhydramine.

## 2022-06-27 ENCOUNTER — NURSE ONLY (OUTPATIENT)
Dept: ALLERGY | Age: 47
End: 2022-06-27
Payer: COMMERCIAL

## 2022-06-27 VITALS
HEART RATE: 84 BPM | RESPIRATION RATE: 16 BRPM | DIASTOLIC BLOOD PRESSURE: 84 MMHG | OXYGEN SATURATION: 96 % | SYSTOLIC BLOOD PRESSURE: 118 MMHG | TEMPERATURE: 98.2 F

## 2022-06-27 DIAGNOSIS — Z51.6 ENCOUNTER FOR DESENSITIZATION TO ALLERGENS: ICD-10-CM

## 2022-06-27 DIAGNOSIS — Z91.048 ALLERGY TO MOLD: ICD-10-CM

## 2022-06-27 DIAGNOSIS — J30.81 ANIMAL DANDER ALLERGY: Primary | ICD-10-CM

## 2022-06-27 PROCEDURE — 95117 IMMUNOTHERAPY INJECTIONS: CPT | Performed by: NURSE PRACTITIONER

## 2022-06-27 NOTE — PROGRESS NOTES
After consent obtained/verified, allergy injection given in back of R/L arm(s). VIAL COLOR OF ALL VIALS TODAY IS Blue    ALLERGY INJECTION FROM VIAL A GIVEN right  UPPER ARM IN THE AMOUNT OF 0.40 ML    ALLERGY INJECTION FROM VIAL B GIVEN left upper, lower ARM IN THE AMOUNT OF 0.40 ML    ALLERGY INJECTION FROM VIAL C GIVEN leftupper ARM IN THE AMOUNT OF 0.40 ML      Documentation of vial injection specific to arm(s) noted on Allergy Immunotherapy Administration Form. Patient waited 30 minutes for observation. No      Patient tolerated well without adverse reaction WHILE IN OFFICE    SHOT REACTION TREATMENT INSTRUCTIONS    During the 30 minute wait after an allergy injection the following symptoms should be reported:    Itching other than at the injection site  Hives or swelling other than at the injection site  Redness other than at the injection site  Difficulty breathing  Chest tightness  Difficulty swallowing  Throat tightness    If these symptoms occur, NOTIFY PROVIDER and the following treatment should be administered:    1. Epinephrine/Auvi Q 1:1000 IM - 0.3 ml if > 66 lbs or more, 0.15 ml if 33 - 63 lbs, or 0.1 ml if <33 lbs     2. Diphenhydramine - give all intramuscular:     2 to <6 years (off-label use): 6.25 mg,    6 to <12 years: 12.5 to 25 mg;    ?12 years: 25-50 mg.    3.  Famotidine:  Adults 40 mg oral    Adolescents age 12 years and >88 lbs: 40 mg    Children and Adolescents ? 12years of age: Initial: 0.25 mg/kg/dose  every 12 hours (maximum daily dose: 40 mg/day)    Epi/Auvi Q dose may me repeated in 5-15 minutes if adequate resolution of symptoms does not occur    Patient should be observed for at least one hour after final Epi/Auvi Q dose and must be seen by provider. Patients cannot drive themselves if they have received diphenhydramine.

## 2022-06-30 ENCOUNTER — NURSE ONLY (OUTPATIENT)
Dept: ALLERGY | Age: 47
End: 2022-06-30
Payer: COMMERCIAL

## 2022-06-30 VITALS
DIASTOLIC BLOOD PRESSURE: 82 MMHG | HEART RATE: 79 BPM | SYSTOLIC BLOOD PRESSURE: 122 MMHG | TEMPERATURE: 99 F | RESPIRATION RATE: 16 BRPM | OXYGEN SATURATION: 97 %

## 2022-06-30 DIAGNOSIS — J30.81 ANIMAL DANDER ALLERGY: ICD-10-CM

## 2022-06-30 DIAGNOSIS — Z91.048 ALLERGY TO MOLD: ICD-10-CM

## 2022-06-30 DIAGNOSIS — Z51.6 ENCOUNTER FOR DESENSITIZATION TO ALLERGENS: Primary | ICD-10-CM

## 2022-06-30 PROCEDURE — 95117 IMMUNOTHERAPY INJECTIONS: CPT | Performed by: NURSE PRACTITIONER

## 2022-06-30 NOTE — PROGRESS NOTES
After consent obtained/verified, allergy injection given in back of R/L arm(s). VIAL COLOR OF ALL VIALS TODAY IS blue    ALLERGY INJECTION FROM VIAL A GIVEN left  UPPER ARM IN THE AMOUNT OF 0.45 ML    ALLERGY INJECTION FROM VIAL B GIVEN right upper ARM IN THE AMOUNT OF 0.45 ML    ALLERGY INJECTION FROM VIAL C GIVEN rightlower ARM IN THE AMOUNT OF 0.45 ML      Documentation of vial injection specific to arm(s) noted on Allergy Immunotherapy Administration Form. Patient waited 30 minutes for observation. No      Patient tolerated well without adverse reaction WHILE IN OFFICE    SHOT REACTION TREATMENT INSTRUCTIONS    During the 30 minute wait after an allergy injection the following symptoms should be reported:    Itching other than at the injection site  Hives or swelling other than at the injection site  Redness other than at the injection site  Difficulty breathing  Chest tightness  Difficulty swallowing  Throat tightness    If these symptoms occur, NOTIFY PROVIDER and the following treatment should be administered:    1. Epinephrine/Auvi Q 1:1000 IM - 0.3 ml if > 66 lbs or more, 0.15 ml if 33 - 63 lbs, or 0.1 ml if <33 lbs     2. Diphenhydramine - give all intramuscular:     2 to <6 years (off-label use): 6.25 mg,    6 to <12 years: 12.5 to 25 mg;    ?12 years: 25-50 mg.    3.  Famotidine:  Adults 40 mg oral    Adolescents age 12 years and >88 lbs: 40 mg    Children and Adolescents ? 12years of age: Initial: 0.25 mg/kg/dose  every 12 hours (maximum daily dose: 40 mg/day)    Epi/Auvi Q dose may me repeated in 5-15 minutes if adequate resolution of symptoms does not occur    Patient should be observed for at least one hour after final Epi/Auvi Q dose and must be seen by provider. Patients cannot drive themselves if they have received diphenhydramine.

## 2022-07-07 ENCOUNTER — NURSE ONLY (OUTPATIENT)
Dept: ALLERGY | Age: 47
End: 2022-07-07
Payer: COMMERCIAL

## 2022-07-07 VITALS
RESPIRATION RATE: 16 BRPM | SYSTOLIC BLOOD PRESSURE: 122 MMHG | TEMPERATURE: 98 F | HEART RATE: 70 BPM | DIASTOLIC BLOOD PRESSURE: 72 MMHG | OXYGEN SATURATION: 98 %

## 2022-07-07 DIAGNOSIS — J30.81 ANIMAL DANDER ALLERGY: ICD-10-CM

## 2022-07-07 DIAGNOSIS — Z51.6 ENCOUNTER FOR DESENSITIZATION TO ALLERGENS: Primary | ICD-10-CM

## 2022-07-07 DIAGNOSIS — Z91.048 ALLERGY TO MOLD: ICD-10-CM

## 2022-07-07 PROCEDURE — 95117 IMMUNOTHERAPY INJECTIONS: CPT | Performed by: NURSE PRACTITIONER

## 2022-07-07 NOTE — PROGRESS NOTES
After consent obtained/verified, allergy injection given in back of R/L arm(s). VIAL COLOR OF ALL VIALS TODAY IS blue     ALLERGY INJECTION FROM VIAL A GIVEN right  UPPER ARM IN THE AMOUNT OF 0.50 ML    ALLERGY INJECTION FROM VIAL B GIVEN left upper ARM IN THE AMOUNT OF 0.50 ML    ALLERGY INJECTION FROM VIAL C GIVEN leftlower ARM IN THE AMOUNT OF 0.50 ML      Documentation of vial injection specific to arm(s) noted on Allergy Immunotherapy Administration Form. Patient waited 30 minutes for observation. No      Patient tolerated well without adverse reaction WHILE IN OFFICE    SHOT REACTION TREATMENT INSTRUCTIONS    During the 30 minute wait after an allergy injection the following symptoms should be reported:    Itching other than at the injection site  Hives or swelling other than at the injection site  Redness other than at the injection site  Difficulty breathing  Chest tightness  Difficulty swallowing  Throat tightness    If these symptoms occur, NOTIFY PROVIDER and the following treatment should be administered:    1. Epinephrine/Auvi Q 1:1000 IM - 0.3 ml if > 66 lbs or more, 0.15 ml if 33 - 63 lbs, or 0.1 ml if <33 lbs     2. Diphenhydramine - give all intramuscular:     2 to <6 years (off-label use): 6.25 mg,    6 to <12 years: 12.5 to 25 mg;    ?12 years: 25-50 mg.    3.  Famotidine:  Adults 40 mg oral    Adolescents age 12 years and >88 lbs: 40 mg    Children and Adolescents ? 12years of age: Initial: 0.25 mg/kg/dose  every 12 hours (maximum daily dose: 40 mg/day)    Epi/Auvi Q dose may me repeated in 5-15 minutes if adequate resolution of symptoms does not occur    Patient should be observed for at least one hour after final Epi/Auvi Q dose and must be seen by provider. Patients cannot drive themselves if they have received diphenhydramine.

## 2022-07-11 ENCOUNTER — NURSE ONLY (OUTPATIENT)
Dept: ALLERGY | Age: 47
End: 2022-07-11
Payer: COMMERCIAL

## 2022-07-11 VITALS
OXYGEN SATURATION: 97 % | HEART RATE: 80 BPM | SYSTOLIC BLOOD PRESSURE: 120 MMHG | TEMPERATURE: 97.3 F | DIASTOLIC BLOOD PRESSURE: 80 MMHG | RESPIRATION RATE: 16 BRPM

## 2022-07-11 DIAGNOSIS — Z91.048 ALLERGY TO MOLD: ICD-10-CM

## 2022-07-11 DIAGNOSIS — Z51.6 ENCOUNTER FOR DESENSITIZATION TO ALLERGENS: Primary | ICD-10-CM

## 2022-07-11 DIAGNOSIS — J30.81 ANIMAL DANDER ALLERGY: ICD-10-CM

## 2022-07-11 PROCEDURE — 95117 IMMUNOTHERAPY INJECTIONS: CPT | Performed by: NURSE PRACTITIONER

## 2022-07-11 NOTE — PROGRESS NOTES
After consent obtained/verified, allergy injection given in back of R/L arm(s). VIAL COLOR OF ALL VIALS TODAY IS YELLOW    ALLERGY INJECTION FROM VIAL A GIVEN left  UPPER ARM IN THE AMOUNT OF 0.05 ML    ALLERGY INJECTION FROM VIAL B GIVEN right lower ARM IN THE AMOUNT OF 0.05 ML    ALLERGY INJECTION FROM VIAL C GIVEN rightupper ARM IN THE AMOUNT OF 0.05 ML      Documentation of vial injection specific to arm(s) noted on Allergy Immunotherapy Administration Form. Patient waited 30 minutes for observation. No      Patient tolerated well without adverse reaction WHILE IN OFFICE    SHOT REACTION TREATMENT INSTRUCTIONS    During the 30 minute wait after an allergy injection the following symptoms should be reported:    Itching other than at the injection site  Hives or swelling other than at the injection site  Redness other than at the injection site  Difficulty breathing  Chest tightness  Difficulty swallowing  Throat tightness    If these symptoms occur, NOTIFY PROVIDER and the following treatment should be administered:    1. Epinephrine/Auvi Q 1:1000 IM - 0.3 ml if > 66 lbs or more, 0.15 ml if 33 - 63 lbs, or 0.1 ml if <33 lbs     2. Diphenhydramine - give all intramuscular:     2 to <6 years (off-label use): 6.25 mg,    6 to <12 years: 12.5 to 25 mg;    ?12 years: 25-50 mg.    3.  Famotidine:  Adults 40 mg oral    Adolescents age 12 years and >88 lbs: 40 mg    Children and Adolescents ? 12years of age: Initial: 0.25 mg/kg/dose  every 12 hours (maximum daily dose: 40 mg/day)    Epi/Auvi Q dose may me repeated in 5-15 minutes if adequate resolution of symptoms does not occur    Patient should be observed for at least one hour after final Epi/Auvi Q dose and must be seen by provider. Patients cannot drive themselves if they have received diphenhydramine. No

## 2022-07-13 ENCOUNTER — TELEPHONE (OUTPATIENT)
Dept: ALLERGY | Age: 47
End: 2022-07-13

## 2022-07-13 NOTE — TELEPHONE ENCOUNTER
Received a fax from [employer] on expiring PA for [id number]. Called PA department at [phone] and [facility]. Refence number [id number].

## 2022-07-14 ENCOUNTER — NURSE ONLY (OUTPATIENT)
Dept: ALLERGY | Age: 47
End: 2022-07-14
Payer: COMMERCIAL

## 2022-07-14 ENCOUNTER — TELEPHONE (OUTPATIENT)
Dept: ALLERGY | Age: 47
End: 2022-07-14

## 2022-07-14 VITALS
DIASTOLIC BLOOD PRESSURE: 76 MMHG | OXYGEN SATURATION: 97 % | RESPIRATION RATE: 16 BRPM | TEMPERATURE: 98.2 F | HEART RATE: 74 BPM | SYSTOLIC BLOOD PRESSURE: 120 MMHG

## 2022-07-14 DIAGNOSIS — J33.0 POLYP OF NASAL CAVITY: Primary | ICD-10-CM

## 2022-07-14 DIAGNOSIS — J30.81 ANIMAL DANDER ALLERGY: ICD-10-CM

## 2022-07-14 DIAGNOSIS — Z51.6 ENCOUNTER FOR DESENSITIZATION TO ALLERGENS: Primary | ICD-10-CM

## 2022-07-14 DIAGNOSIS — Z91.048 ALLERGY TO MOLD: ICD-10-CM

## 2022-07-14 PROCEDURE — 95117 IMMUNOTHERAPY INJECTIONS: CPT | Performed by: NURSE PRACTITIONER

## 2022-07-14 NOTE — TELEPHONE ENCOUNTER
Call [facility] regarding denial of PA. PA was denied related to intake person at [facility] having broken english. According to [place] intake at [facility] was name was Sagrario. Called [facility] to start a second PA.   Explained situation of

## 2022-07-14 NOTE — TELEPHONE ENCOUNTER
Pt informed of denial for dupixent pa. Pt states same thing happened last time when trying to submit.  Informed provider is working on appeal.

## 2022-07-14 NOTE — PROGRESS NOTES
After consent obtained/verified, allergy injection given in back of R/L arm(s). VIAL COLOR OF ALL VIALS TODAY IS yellow    ALLERGY INJECTION FROM VIAL A GIVEN right  UPPER ARM IN THE AMOUNT OF 01.0 ML    ALLERGY INJECTION FROM VIAL B GIVEN left upper ARM IN THE AMOUNT OF 0.10 ML    ALLERGY INJECTION FROM VIAL C GIVEN leftlower ARM IN THE AMOUNT OF 0.10 ML      Documentation of vial injection specific to arm(s) noted on Allergy Immunotherapy Administration Form. Patient waited 30 minutes for observation. No      Patient tolerated well without adverse reaction WHILE IN OFFICE    SHOT REACTION TREATMENT INSTRUCTIONS    During the 30 minute wait after an allergy injection the following symptoms should be reported:    Itching other than at the injection site  Hives or swelling other than at the injection site  Redness other than at the injection site  Difficulty breathing  Chest tightness  Difficulty swallowing  Throat tightness    If these symptoms occur, NOTIFY PROVIDER and the following treatment should be administered:    1. Epinephrine/Auvi Q 1:1000 IM - 0.3 ml if > 66 lbs or more, 0.15 ml if 33 - 63 lbs, or 0.1 ml if <33 lbs     2. Diphenhydramine - give all intramuscular:     2 to <6 years (off-label use): 6.25 mg,    6 to <12 years: 12.5 to 25 mg;    ?12 years: 25-50 mg.    3.  Famotidine:  Adults 40 mg oral    Adolescents age 12 years and >88 lbs: 40 mg    Children and Adolescents ? 12years of age: Initial: 0.25 mg/kg/dose  every 12 hours (maximum daily dose: 40 mg/day)    Epi/Auvi Q dose may me repeated in 5-15 minutes if adequate resolution of symptoms does not occur    Patient should be observed for at least one hour after final Epi/Auvi Q dose and must be seen by provider. Patients cannot drive themselves if they have received diphenhydramine.

## 2022-07-14 NOTE — TELEPHONE ENCOUNTER
Received fax from [address] [place] [address] on PA approval for dupixent. [id number] approval through 07/14/2023. Fax scanned into media.

## 2022-07-14 NOTE — TELEPHONE ENCOUNTER
Recieved fax from [facility] on pa denial for dupixent. Provider informed and called to appeal. Will receive notice of decision to office.

## 2022-08-08 ENCOUNTER — NURSE ONLY (OUTPATIENT)
Dept: ALLERGY | Age: 47
End: 2022-08-08
Payer: COMMERCIAL

## 2022-08-08 VITALS
HEART RATE: 82 BPM | DIASTOLIC BLOOD PRESSURE: 84 MMHG | TEMPERATURE: 97.1 F | OXYGEN SATURATION: 94 % | SYSTOLIC BLOOD PRESSURE: 120 MMHG

## 2022-08-08 DIAGNOSIS — J30.1 ALLERGY TO TREES: ICD-10-CM

## 2022-08-08 DIAGNOSIS — Z91.048 ALLERGY TO MOLD: ICD-10-CM

## 2022-08-08 DIAGNOSIS — Z51.6 ENCOUNTER FOR DESENSITIZATION TO ALLERGENS: Primary | ICD-10-CM

## 2022-08-08 PROCEDURE — 95117 IMMUNOTHERAPY INJECTIONS: CPT | Performed by: NURSE PRACTITIONER

## 2022-08-08 NOTE — PROGRESS NOTES
After consent obtained/verified, allergy injection given in back of R/L arm(s). VIAL COLOR OF ALL VIALS TODAY IS YELLOW    ALLERGY INJECTION FROM VIAL A GIVEN left  UPPER ARM IN THE AMOUNT OF 0.10 ML    ALLERGY INJECTION FROM VIAL B GIVEN right upper ARM IN THE AMOUNT OF 0.10 ML    ALLERGY INJECTION FROM VIAL C GIVEN rightlower ARM IN THE AMOUNT OF 0.10 ML      Documentation of vial injection specific to arm(s) noted on Allergy Immunotherapy Administration Form. Patient waited 30 minutes for observation. No      Patient tolerated well without adverse reaction WHILE IN OFFICE    SHOT REACTION TREATMENT INSTRUCTIONS    During the 30 minute wait after an allergy injection the following symptoms should be reported:    Itching other than at the injection site  Hives or swelling other than at the injection site  Redness other than at the injection site  Difficulty breathing  Chest tightness  Difficulty swallowing  Throat tightness    If these symptoms occur, NOTIFY PROVIDER and the following treatment should be administered:    1. Epinephrine/Auvi Q 1:1000 IM - 0.3 ml if > 66 lbs or more, 0.15 ml if 33 - 63 lbs, or 0.1 ml if <33 lbs     2. Diphenhydramine - give all intramuscular:     2 to <6 years (off-label use): 6.25 mg,    6 to <12 years: 12.5 to 25 mg;    ?12 years: 25-50 mg.    3.  Famotidine:  Adults 40 mg oral    Adolescents age 12 years and >88 lbs: 40 mg    Children and Adolescents ? 12years of age: Initial: 0.25 mg/kg/dose  every 12 hours (maximum daily dose: 40 mg/day)    Epi/Auvi Q dose may me repeated in 5-15 minutes if adequate resolution of symptoms does not occur    Patient should be observed for at least one hour after final Epi/Auvi Q dose and must be seen by provider. Patients cannot drive themselves if they have received diphenhydramine.

## 2022-08-11 ENCOUNTER — NURSE ONLY (OUTPATIENT)
Dept: ALLERGY | Age: 47
End: 2022-08-11
Payer: COMMERCIAL

## 2022-08-11 VITALS
TEMPERATURE: 96.4 F | SYSTOLIC BLOOD PRESSURE: 118 MMHG | OXYGEN SATURATION: 98 % | HEART RATE: 73 BPM | DIASTOLIC BLOOD PRESSURE: 80 MMHG

## 2022-08-11 DIAGNOSIS — Z91.048 ALLERGY TO MOLD: ICD-10-CM

## 2022-08-11 DIAGNOSIS — Z51.6 ENCOUNTER FOR DESENSITIZATION TO ALLERGENS: Primary | ICD-10-CM

## 2022-08-11 DIAGNOSIS — J30.1 ALLERGY TO TREES: ICD-10-CM

## 2022-08-11 PROCEDURE — 95117 IMMUNOTHERAPY INJECTIONS: CPT | Performed by: NURSE PRACTITIONER

## 2022-08-11 NOTE — PROGRESS NOTES
After consent obtained/verified, allergy injection given in back of R/L arm(s). VIAL COLOR OF ALL VIALS TODAY IS yellow    ALLERGY INJECTION FROM VIAL A GIVEN right  UPPER ARM IN THE AMOUNT OF 0.15 ML    ALLERGY INJECTION FROM VIAL B GIVEN left lower ARM IN THE AMOUNT OF 0.15 ML    ALLERGY INJECTION FROM VIAL C GIVEN leftupper ARM IN THE AMOUNT OF 0.15 ML      Documentation of vial injection specific to arm(s) noted on Allergy Immunotherapy Administration Form. Patient waited 30 minutes for observation. No      Patient tolerated well without adverse reaction WHILE IN OFFICE    SHOT REACTION TREATMENT INSTRUCTIONS    During the 30 minute wait after an allergy injection the following symptoms should be reported:    Itching other than at the injection site  Hives or swelling other than at the injection site  Redness other than at the injection site  Difficulty breathing  Chest tightness  Difficulty swallowing  Throat tightness    If these symptoms occur, NOTIFY PROVIDER and the following treatment should be administered:    1. Epinephrine/Auvi Q 1:1000 IM - 0.3 ml if > 66 lbs or more, 0.15 ml if 33 - 63 lbs, or 0.1 ml if <33 lbs     2. Diphenhydramine - give all intramuscular:     2 to <6 years (off-label use): 6.25 mg,    6 to <12 years: 12.5 to 25 mg;    ?12 years: 25-50 mg.    3.  Famotidine:  Adults 40 mg oral    Adolescents age 12 years and >88 lbs: 40 mg    Children and Adolescents ? 12years of age: Initial: 0.25 mg/kg/dose  every 12 hours (maximum daily dose: 40 mg/day)    Epi/Auvi Q dose may me repeated in 5-15 minutes if adequate resolution of symptoms does not occur    Patient should be observed for at least one hour after final Epi/Auvi Q dose and must be seen by provider. Patients cannot drive themselves if they have received diphenhydramine.

## 2022-08-15 ENCOUNTER — NURSE ONLY (OUTPATIENT)
Dept: ALLERGY | Age: 47
End: 2022-08-15
Payer: COMMERCIAL

## 2022-08-15 VITALS
HEART RATE: 90 BPM | TEMPERATURE: 97.9 F | OXYGEN SATURATION: 99 % | SYSTOLIC BLOOD PRESSURE: 118 MMHG | RESPIRATION RATE: 18 BRPM | DIASTOLIC BLOOD PRESSURE: 82 MMHG

## 2022-08-15 DIAGNOSIS — Z51.6 DESENSITIZATION TO ALLERGENS: Primary | ICD-10-CM

## 2022-08-15 DIAGNOSIS — J30.81 ALLERGY TO CATS: ICD-10-CM

## 2022-08-15 DIAGNOSIS — Z91.09 ALLERGY TO AMERICAN HOUSE DUST MITE: ICD-10-CM

## 2022-08-15 PROCEDURE — 95117 IMMUNOTHERAPY INJECTIONS: CPT | Performed by: NURSE PRACTITIONER

## 2022-08-15 NOTE — PROGRESS NOTES
After consent obtained/verified, allergy injection given in back of R/L arm(s). VIAL COLOR OF ALL VIALS TODAY IS YELLOW    ALLERGY INJECTION FROM VIAL A GIVEN left  UPPER ARM IN THE AMOUNT OF 0.20 ML    ALLERGY INJECTION FROM VIAL B GIVEN right lower ARM IN THE AMOUNT OF 0.20 ML    ALLERGY INJECTION FROM VIAL C GIVEN rightupper ARM IN THE AMOUNT OF 0.20 ML      Documentation of vial injection specific to arm(s) noted on Allergy Immunotherapy Administration Form. Patient waited 30 minutes for observation. No      Patient tolerated well without adverse reaction WHILE IN OFFICE    SHOT REACTION TREATMENT INSTRUCTIONS    During the 30 minute wait after an allergy injection the following symptoms should be reported:    Itching other than at the injection site  Hives or swelling other than at the injection site  Redness other than at the injection site  Difficulty breathing  Chest tightness  Difficulty swallowing  Throat tightness    If these symptoms occur, NOTIFY PROVIDER and the following treatment should be administered:    1. Epinephrine/Auvi Q 1:1000 IM - 0.3 ml if > 66 lbs or more, 0.15 ml if 33 - 63 lbs, or 0.1 ml if <33 lbs     2. Diphenhydramine - give all intramuscular:     2 to <6 years (off-label use): 6.25 mg,    6 to <12 years: 12.5 to 25 mg;    ?12 years: 25-50 mg.    3.  Famotidine:  Adults 40 mg oral    Adolescents age 12 years and >88 lbs: 40 mg    Children and Adolescents ? 12years of age: Initial: 0.25 mg/kg/dose  every 12 hours (maximum daily dose: 40 mg/day)    Epi/Auvi Q dose may me repeated in 5-15 minutes if adequate resolution of symptoms does not occur    Patient should be observed for at least one hour after final Epi/Auvi Q dose and must be seen by provider. Patients cannot drive themselves if they have received diphenhydramine.

## 2022-08-18 ENCOUNTER — NURSE ONLY (OUTPATIENT)
Dept: ALLERGY | Age: 47
End: 2022-08-18
Payer: COMMERCIAL

## 2022-08-18 VITALS
DIASTOLIC BLOOD PRESSURE: 74 MMHG | SYSTOLIC BLOOD PRESSURE: 120 MMHG | TEMPERATURE: 97.9 F | OXYGEN SATURATION: 98 % | HEART RATE: 77 BPM | RESPIRATION RATE: 16 BRPM

## 2022-08-18 DIAGNOSIS — Z91.09 ALLERGY TO AMERICAN HOUSE DUST MITE: Primary | ICD-10-CM

## 2022-08-18 DIAGNOSIS — J30.81 ALLERGY TO CATS: ICD-10-CM

## 2022-08-18 DIAGNOSIS — Z51.6 ENCOUNTER FOR DESENSITIZATION TO ALLERGENS: ICD-10-CM

## 2022-08-18 PROCEDURE — 95117 IMMUNOTHERAPY INJECTIONS: CPT | Performed by: NURSE PRACTITIONER

## 2022-08-18 NOTE — PROGRESS NOTES
After consent obtained/verified, allergy injection given in back of R/L arm(s). VIAL COLOR OF ALL VIALS TODAY IS yellow    ALLERGY INJECTION FROM VIAL A GIVEN right  UPPER ARM IN THE AMOUNT OF 0.25 ML    ALLERGY INJECTION FROM VIAL B GIVEN left upper ARM IN THE AMOUNT OF 0.25 ML    ALLERGY INJECTION FROM VIAL C GIVEN leftlower ARM IN THE AMOUNT OF 0.25 ML      Documentation of vial injection specific to arm(s) noted on Allergy Immunotherapy Administration Form. Patient waited 30 minutes for observation. No      Patient tolerated well without adverse reaction WHILE IN OFFICE    SHOT REACTION TREATMENT INSTRUCTIONS    During the 30 minute wait after an allergy injection the following symptoms should be reported:    Itching other than at the injection site  Hives or swelling other than at the injection site  Redness other than at the injection site  Difficulty breathing  Chest tightness  Difficulty swallowing  Throat tightness    If these symptoms occur, NOTIFY PROVIDER and the following treatment should be administered:    1. Epinephrine/Auvi Q 1:1000 IM - 0.3 ml if > 66 lbs or more, 0.15 ml if 33 - 63 lbs, or 0.1 ml if <33 lbs     2. Diphenhydramine - give all intramuscular:     2 to <6 years (off-label use): 6.25 mg,    6 to <12 years: 12.5 to 25 mg;    ?12 years: 25-50 mg.    3.  Famotidine:  Adults 40 mg oral    Adolescents age 12 years and >88 lbs: 40 mg    Children and Adolescents ? 12years of age: Initial: 0.25 mg/kg/dose  every 12 hours (maximum daily dose: 40 mg/day)    Epi/Auvi Q dose may me repeated in 5-15 minutes if adequate resolution of symptoms does not occur    Patient should be observed for at least one hour after final Epi/Auvi Q dose and must be seen by provider. Patients cannot drive themselves if they have received diphenhydramine.

## 2022-08-22 ENCOUNTER — NURSE ONLY (OUTPATIENT)
Dept: ALLERGY | Age: 47
End: 2022-08-22
Payer: COMMERCIAL

## 2022-08-22 VITALS
DIASTOLIC BLOOD PRESSURE: 74 MMHG | RESPIRATION RATE: 16 BRPM | HEART RATE: 92 BPM | OXYGEN SATURATION: 98 % | SYSTOLIC BLOOD PRESSURE: 120 MMHG | TEMPERATURE: 98.4 F

## 2022-08-22 DIAGNOSIS — J30.1 ALLERGY TO TREES: ICD-10-CM

## 2022-08-22 DIAGNOSIS — Z91.09 ALLERGY TO AMERICAN HOUSE DUST MITE: ICD-10-CM

## 2022-08-22 DIAGNOSIS — Z51.6 ENCOUNTER FOR DESENSITIZATION TO ALLERGENS: Primary | ICD-10-CM

## 2022-08-22 PROCEDURE — 95117 IMMUNOTHERAPY INJECTIONS: CPT | Performed by: NURSE PRACTITIONER

## 2022-08-22 NOTE — PROGRESS NOTES
After consent obtained/verified, allergy injection given in back of R/L arm(s). VIAL COLOR OF ALL VIALS TODAY IS YELLOW    ALLERGY INJECTION FROM VIAL A GIVEN left  UPPER ARM IN THE AMOUNT OF 0.30 ML    ALLERGY INJECTION FROM VIAL B GIVEN right upper ARM IN THE AMOUNT OF 0.30 ML    ALLERGY INJECTION FROM VIAL C GIVEN right lower ARM IN THE AMOUNT OF 0.30 ML      Documentation of vial injection specific to arm(s) noted on Allergy Immunotherapy Administration Form. Patient waited 30 minutes for observation. No      Patient tolerated well without adverse reaction WHILE IN OFFICE    SHOT REACTION TREATMENT INSTRUCTIONS    During the 30 minute wait after an allergy injection the following symptoms should be reported:    Itching other than at the injection site  Hives or swelling other than at the injection site  Redness other than at the injection site  Difficulty breathing  Chest tightness  Difficulty swallowing  Throat tightness    If these symptoms occur, NOTIFY PROVIDER and the following treatment should be administered:    1. Epinephrine/Auvi Q 1:1000 IM - 0.3 ml if > 66 lbs or more, 0.15 ml if 33 - 63 lbs, or 0.1 ml if <33 lbs     2. Diphenhydramine - give all intramuscular:     2 to <6 years (off-label use): 6.25 mg,    6 to <12 years: 12.5 to 25 mg;    ?12 years: 25-50 mg.    3.  Famotidine:  Adults 40 mg oral    Adolescents age 12 years and >88 lbs: 40 mg    Children and Adolescents ? 12years of age: Initial: 0.25 mg/kg/dose  every 12 hours (maximum daily dose: 40 mg/day)    Epi/Auvi Q dose may me repeated in 5-15 minutes if adequate resolution of symptoms does not occur    Patient should be observed for at least one hour after final Epi/Auvi Q dose and must be seen by provider. Patients cannot drive themselves if they have received diphenhydramine.

## 2022-08-24 ENCOUNTER — NURSE ONLY (OUTPATIENT)
Dept: ALLERGY | Age: 47
End: 2022-08-24
Payer: COMMERCIAL

## 2022-08-24 ENCOUNTER — OFFICE VISIT (OUTPATIENT)
Dept: ENT CLINIC | Age: 47
End: 2022-08-24
Payer: COMMERCIAL

## 2022-08-24 VITALS
TEMPERATURE: 97.1 F | DIASTOLIC BLOOD PRESSURE: 82 MMHG | HEART RATE: 68 BPM | OXYGEN SATURATION: 98 % | SYSTOLIC BLOOD PRESSURE: 122 MMHG

## 2022-08-24 VITALS
OXYGEN SATURATION: 98 % | WEIGHT: 174 LBS | RESPIRATION RATE: 16 BRPM | DIASTOLIC BLOOD PRESSURE: 82 MMHG | SYSTOLIC BLOOD PRESSURE: 122 MMHG | HEIGHT: 63 IN | HEART RATE: 68 BPM | TEMPERATURE: 97.3 F | BODY MASS INDEX: 30.83 KG/M2

## 2022-08-24 DIAGNOSIS — J30.1 ALLERGY TO TREES: ICD-10-CM

## 2022-08-24 DIAGNOSIS — J32.9 CHRONIC RHINOSINUSITIS: Primary | ICD-10-CM

## 2022-08-24 DIAGNOSIS — J31.0 CHRONIC RHINOSINUSITIS: Primary | ICD-10-CM

## 2022-08-24 DIAGNOSIS — Z91.09 ALLERGY TO AMERICAN HOUSE DUST MITE: ICD-10-CM

## 2022-08-24 DIAGNOSIS — Z51.6 ENCOUNTER FOR DESENSITIZATION TO ALLERGENS: Primary | ICD-10-CM

## 2022-08-24 DIAGNOSIS — J33.9 NASAL POLYPOSIS: ICD-10-CM

## 2022-08-24 PROCEDURE — 31231 NASAL ENDOSCOPY DX: CPT | Performed by: OTOLARYNGOLOGY

## 2022-08-24 PROCEDURE — 99214 OFFICE O/P EST MOD 30 MIN: CPT | Performed by: OTOLARYNGOLOGY

## 2022-08-24 PROCEDURE — 95117 IMMUNOTHERAPY INJECTIONS: CPT | Performed by: NURSE PRACTITIONER

## 2022-08-24 NOTE — PROGRESS NOTES
After consent obtained/verified, allergy injection given in back of R/L arm(s). VIAL COLOR OF ALL VIALS TODAY IS YELLOW    ALLERGY INJECTION FROM VIAL A GIVEN right  UPPER ARM IN THE AMOUNT OF 0.35 ML    ALLERGY INJECTION FROM VIAL B GIVEN left lower ARM IN THE AMOUNT OF 0.35 ML    ALLERGY INJECTION FROM VIAL C GIVEN leftupper ARM IN THE AMOUNT OF 0.35 ML      Documentation of vial injection specific to arm(s) noted on Allergy Immunotherapy Administration Form. Patient waited 30 minutes for observation. No      Patient tolerated well without adverse reaction WHILE IN OFFICE    SHOT REACTION TREATMENT INSTRUCTIONS    During the 30 minute wait after an allergy injection the following symptoms should be reported:    Itching other than at the injection site  Hives or swelling other than at the injection site  Redness other than at the injection site  Difficulty breathing  Chest tightness  Difficulty swallowing  Throat tightness    If these symptoms occur, NOTIFY PROVIDER and the following treatment should be administered:    1. Epinephrine/Auvi Q 1:1000 IM - 0.3 ml if > 66 lbs or more, 0.15 ml if 33 - 63 lbs, or 0.1 ml if <33 lbs     2. Diphenhydramine - give all intramuscular:     2 to <6 years (off-label use): 6.25 mg,    6 to <12 years: 12.5 to 25 mg;    ?12 years: 25-50 mg.    3.  Famotidine:  Adults 40 mg oral    Adolescents age 12 years and >88 lbs: 40 mg    Children and Adolescents ? 12years of age: Initial: 0.25 mg/kg/dose  every 12 hours (maximum daily dose: 40 mg/day)    Epi/Auvi Q dose may me repeated in 5-15 minutes if adequate resolution of symptoms does not occur    Patient should be observed for at least one hour after final Epi/Auvi Q dose and must be seen by provider. Patients cannot drive themselves if they have received diphenhydramine.

## 2022-08-24 NOTE — PROGRESS NOTES
[facility] [place] [facility]  [facility] EAR, NOSE AND THROAT  [facility]  Dept: [phone]  Dept Fax: [phone]  Loc: [phone]    Lashae Lamas is a 49 y.o. female who was referred by No ref. provider found for:  Chief Complaint   Patient presents with    Other     Patient is here for drainage happening       HPI:     Lashae Lamas is a 49 y.o. female with a history of chronic rhinosinusitis and nasal polyposis status post multiple endonasal operations to bring this process under control. The patient returns today with a concern that she is developing some mucopurulence and occasional foul odor that she perceives particularly in the right nasal passage. History: Allergies   Allergen Reactions    [place] [Macadamia Nut Oil]      Almonds per allergy testing    Seasonal      Current Outpatient Medications   Medication Sig Dispense Refill    dupilumab (DUPIXENT) 300 MG/2ML SOSY injection Inject 2 mLs into the skin every 14 days 2 each 5    montelukast (SINGULAIR) 10 MG tablet Take 1 tablet by mouth nightly 90 tablet 1    ketotifen (ZADITOR) 0.025 % ophthalmic solution One drop in each eye twice daily as needed 1 each 2    ALLERGEN EXTRACT Inject as directed Twice a Week       meclizine (ANTIVERT) 25 MG tablet Take 1 tablet by mouth 4 times daily as needed for Dizziness 40 tablet 1    ZINC PO Take by mouth daily       Fluticasone Propionate (XHANCE) 93 MCG/ACT EXHU 1 spray by Nasal route 2 times daily 6.3 mL 11    chlorpheniramine (CHLOR-TRIMETON) 2 MG/5ML syrup Take 2 mg by mouth every 4 hours as needed for Allergies       EPINEPHrine (AUVI-Q) 0.3 MG/0.3ML SOAJ injection Dispense 2 packs of 2 (total 4 devices). Use as directed, STAT for allergic reaction.  2 each 0    Multiple Vitamin (MULTI VITAMIN DAILY PO) Take by mouth daily       Ascorbic Acid (VITAMIN C PO) Take by mouth daily       VITAMIN D PO Take by mouth 2 times daily Probiotic Product (PROBIOTIC PO) Take by mouth 1 tab in morning      b complex vitamins capsule Take 1 capsule by mouth daily       cetirizine (ZYRTEC) 10 MG tablet Take 10 mg by mouth nightly Not in last 10 days      Pseudoephedrine HCl (SUDAFED 12 HOUR PO) Take 1 tablet by mouth daily        No current facility-administered medications for this visit.      Past Medical History:   Diagnosis Date    Asthma     COVID 01/09/2022    Panic attacks 4/98    PONV (postoperative nausea and vomiting)     Scopalamine patch    Seasonal allergies 4/99    Sinusitis, chronic       Past Surgical History:   Procedure Laterality Date    CYST REMOVAL      INTRAUTERINE DEVICE INSERTION      murena-  Out 2014    SINUS ENDOSCOPY  04/2021    dr Willian Esparza Bilateral 04/30/2021    SINUS ENDOSCOPY FUNCTIONAL SURGERY IMAGE GUIDED, RIGHT MAXILLARY ANTROSTOMY WITH REMOVAL OF TISSUE FROM MAXILLARY SINUS, RIGHT PARTIAL ANTERIOR ETHMOIDECTOMY, NASAL ENDOSCOPY OF LEFT ABBEY BULLOSA performed by Minesh Jimenes MD at [address] Bilateral 2/4/2022    IMAGE GUIDED ENDOSCOPY SURGERY BILATERAL ETHMOIDECTOMY(OSSEOUS RIGHT ETHMOID AIR CELLS OPACIFIED LEFT ETHMOID AIR CELL) RIGHT NASAL ANTRAL WINDOW performed by Minesh Jimenes MD at [address] Bilateral 5/5/2022    Image Guided Surgery for Right Frontal Sinus Exploration with Removal of Tissue, Right Nasal Antral Window Revision Left Ethmoidectomy Revision performed by Minesh Jimenes MD at [address]  02/04/2022    dr Erin Robles  07/08/2014    Dr Saha Rank; Removal IUD     Family History   Problem Relation Age of Onset    Breast Cancer Mother 52    Cancer Mother     Breast Cancer Maternal Grandmother 48    Cancer Maternal Grandmother     High Blood Pressure Maternal Grandmother     Stroke Maternal Grandmother     Diabetes Maternal Grandfather     Heart Disease Neg Hx      Social History     Tobacco Use Smoking status: Never    Smokeless tobacco: Never   Substance Use Topics    Alcohol use: Yes     Comment: occasinally        Subjective:      Review of Systems  Rest of review of systems are negative, except as noted in HPI. Objective:     /82 (Site: Left Upper Arm, Position: Sitting)   Pulse 68   Temp 97.3 °F (36.3 °C) (Infrared)   Resp 16   Ht 5' 3\" (1.6 m)   Wt 174 lb (78.9 kg)   SpO2 98%   BMI 30.82 kg/m²     Physical Exam       On general physical exam the patient is a pleasant alert cooperative healthy-appearing adult female who appears younger than her stated age. Her voice and her speech pattern are both within normal limits for her age and gender. I heard no throat clearing coughing or inspiratory stridor. She was breathing comfortably through both sides of her nose. There is no signs of recent bleeding or mucopurulence about the nostrils. Procedure: Nasal endoscopy; bilateral  Indication: The patient has history of chronic rhinosinusitis and has a recent uptick in her symptom profile and warrants an endoscopic evaluation to look for signs of recurrent polyposis and purulent drainage  Findings: The patient's left nasal airway was abnormal for the presence of a middle meatus polyp that was bulging into the airway but not fully obstructed. No mucopurulence was seen. The sinus passages up into the ethmoids and frontal recesses as well as back to the sphenoids were all widely patent. No signs of recent bleeding were seen. On the right side, the patient's maxillary sinus was widely patent as well as the nasal antral window. Her sinus passages through the ethmoids to the frontal recess and sphenoid sinus were all patent. No mucopurulence or polyps are seen whatsoever. The patient tolerated the procedure well.     Nasal endoscopy images:  Left nasal airway    Left ethmoid air cells    Same    Maxillary polyp    Right nasal airway    Right maxillary sinus and ethmoids    Right frontal recess    Right frontal sinus    Nasal antral window on the right side        Vitals reviewed. No results found. Lab Results   Component Value Date/Time     03/05/2021 12:00 AM    K 4.2 03/05/2021 12:00 AM     03/05/2021 12:00 AM    CO2 22 03/05/2021 12:00 AM    BUN 8 03/05/2021 12:00 AM    CREATININE 0.74 03/05/2021 12:00 AM    CALCIUM 9.2 03/05/2021 12:00 AM    LABALBU 4.2 03/05/2021 12:00 AM    BILITOT 0.5 03/05/2021 12:00 AM    ALKPHOS 78 03/05/2021 12:00 AM    AST 22 03/05/2021 12:00 AM    ALT 23 03/05/2021 12:00 AM       All of the past medical history, past surgical history, family history,social history, allergies and current medications were reviewed with the patient. Assessment & Plan   Diagnoses and all orders for this visit:     Diagnosis Orders   1. Chronic rhinosinusitis  CT FACIAL BONES WO CONTRAST      2. Nasal polyposis  CT FACIAL BONES WO CONTRAST          Based on the patient's history and these physical findings, the patient has had a recent return of mucopurulence and a foul odor. The patient is very reliable and while I did not see any structural pathology today apart from a potential polyp of the left side, I will get a sinus CT to look deeper into the structures and see if she has any residual air cells that warrant treatment. If they do I will offer this to her using IGS navigation which will allow me to target any residual offending cells. Otherwise I will see her back in approximately 3 months and we will keep her on her nasal irrigations for the time being. I explained all this in detail to the patient to her satisfaction. She reported being pleased with the outcome of the visit and being willing to proceed as such.     I spent over 30 minutes of face-to-face time with the patient more than half of which was dedicated to reviewing her complex history and planning this program.      Return in about 3 months (around 11/24/2022) for For routine follow-up; sooner if CT scan positive for surgical disease. **This report has been created using voice recognition software. It may contain minor errors which are inherent in voice recognition technology. **

## 2022-08-29 ENCOUNTER — NURSE ONLY (OUTPATIENT)
Dept: ALLERGY | Age: 47
End: 2022-08-29
Payer: COMMERCIAL

## 2022-08-29 VITALS
RESPIRATION RATE: 18 BRPM | TEMPERATURE: 97.1 F | HEART RATE: 64 BPM | OXYGEN SATURATION: 99 % | DIASTOLIC BLOOD PRESSURE: 74 MMHG | SYSTOLIC BLOOD PRESSURE: 120 MMHG

## 2022-08-29 DIAGNOSIS — Z51.6 ENCOUNTER FOR DESENSITIZATION TO ALLERGENS: Primary | ICD-10-CM

## 2022-08-29 DIAGNOSIS — Z91.09 ALLERGY TO AMERICAN HOUSE DUST MITE: ICD-10-CM

## 2022-08-29 DIAGNOSIS — J30.1 ALLERGY TO TREES: ICD-10-CM

## 2022-08-29 PROCEDURE — 95117 IMMUNOTHERAPY INJECTIONS: CPT | Performed by: NURSE PRACTITIONER

## 2022-08-29 NOTE — PROGRESS NOTES
After consent obtained/verified, allergy injection given in back of R/L arm(s). VIAL COLOR OF ALL VIALS TODAY IS YELLOW    ALLERGY INJECTION FROM VIAL A GIVEN left  UPPER ARM IN THE AMOUNT OF 0.40 ML    ALLERGY INJECTION FROM VIAL B GIVEN right upper ARM IN THE AMOUNT OF 0.40 ML    ALLERGY INJECTION FROM VIAL C GIVEN rightlower ARM IN THE AMOUNT OF 0.40 ML      Documentation of vial injection specific to arm(s) noted on Allergy Immunotherapy Administration Form. Patient waited 30 minutes for observation. No      Patient tolerated well without adverse reaction WHILE IN OFFICE    SHOT REACTION TREATMENT INSTRUCTIONS    During the 30 minute wait after an allergy injection the following symptoms should be reported:    Itching other than at the injection site  Hives or swelling other than at the injection site  Redness other than at the injection site  Difficulty breathing  Chest tightness  Difficulty swallowing  Throat tightness    If these symptoms occur, NOTIFY PROVIDER and the following treatment should be administered:    1. Epinephrine/Auvi Q 1:1000 IM - 0.3 ml if > 66 lbs or more, 0.15 ml if 33 - 63 lbs, or 0.1 ml if <33 lbs     2. Diphenhydramine - give all intramuscular:     2 to <6 years (off-label use): 6.25 mg,    6 to <12 years: 12.5 to 25 mg;    ?12 years: 25-50 mg.    3.  Famotidine:  Adults 40 mg oral    Adolescents age 12 years and >88 lbs: 40 mg    Children and Adolescents ? 12years of age: Initial: 0.25 mg/kg/dose  every 12 hours (maximum daily dose: 40 mg/day)    Epi/Auvi Q dose may me repeated in 5-15 minutes if adequate resolution of symptoms does not occur    Patient should be observed for at least one hour after final Epi/Auvi Q dose and must be seen by provider. Patients cannot drive themselves if they have received diphenhydramine.

## 2022-09-01 ENCOUNTER — NURSE ONLY (OUTPATIENT)
Dept: ALLERGY | Age: 47
End: 2022-09-01
Payer: COMMERCIAL

## 2022-09-01 VITALS
DIASTOLIC BLOOD PRESSURE: 68 MMHG | HEART RATE: 72 BPM | SYSTOLIC BLOOD PRESSURE: 118 MMHG | OXYGEN SATURATION: 98 % | RESPIRATION RATE: 16 BRPM | TEMPERATURE: 97 F

## 2022-09-01 DIAGNOSIS — Z91.09 ALLERGY TO AMERICAN HOUSE DUST MITE: ICD-10-CM

## 2022-09-01 DIAGNOSIS — J30.1 ALLERGY TO TREES: ICD-10-CM

## 2022-09-01 DIAGNOSIS — Z51.6 ENCOUNTER FOR DESENSITIZATION TO ALLERGENS: Primary | ICD-10-CM

## 2022-09-01 PROCEDURE — 95117 IMMUNOTHERAPY INJECTIONS: CPT | Performed by: NURSE PRACTITIONER

## 2022-09-01 NOTE — PROGRESS NOTES
After consent obtained/verified, allergy injection given in back of R/L arm(s). VIAL COLOR OF ALL VIALS TODAY IS yellow    ALLERGY INJECTION FROM VIAL A GIVEN right  UPPER ARM IN THE AMOUNT OF 0.45 ML    ALLERGY INJECTION FROM VIAL B GIVEN left upper ARM IN THE AMOUNT OF 0.45 ML    ALLERGY INJECTION FROM VIAL C GIVEN leftlower ARM IN THE AMOUNT OF 0.45 ML      Documentation of vial injection specific to arm(s) noted on Allergy Immunotherapy Administration Form. Patient waited 30 minutes for observation. No      Patient tolerated well without adverse reaction WHILE IN OFFICE    SHOT REACTION TREATMENT INSTRUCTIONS    During the 30 minute wait after an allergy injection the following symptoms should be reported:    Itching other than at the injection site  Hives or swelling other than at the injection site  Redness other than at the injection site  Difficulty breathing  Chest tightness  Difficulty swallowing  Throat tightness    If these symptoms occur, NOTIFY PROVIDER and the following treatment should be administered:    1. Epinephrine/Auvi Q 1:1000 IM - 0.3 ml if > 66 lbs or more, 0.15 ml if 33 - 63 lbs, or 0.1 ml if <33 lbs     2. Diphenhydramine - give all intramuscular:     2 to <6 years (off-label use): 6.25 mg,    6 to <12 years: 12.5 to 25 mg;    ?12 years: 25-50 mg.    3.  Famotidine:  Adults 40 mg oral    Adolescents age 12 years and >88 lbs: 40 mg    Children and Adolescents ? 12years of age: Initial: 0.25 mg/kg/dose  every 12 hours (maximum daily dose: 40 mg/day)    Epi/Auvi Q dose may me repeated in 5-15 minutes if adequate resolution of symptoms does not occur    Patient should be observed for at least one hour after final Epi/Auvi Q dose and must be seen by provider. Patients cannot drive themselves if they have received diphenhydramine.

## 2022-09-08 ENCOUNTER — NURSE ONLY (OUTPATIENT)
Dept: ALLERGY | Age: 47
End: 2022-09-08
Payer: COMMERCIAL

## 2022-09-08 VITALS
HEART RATE: 76 BPM | OXYGEN SATURATION: 98 % | DIASTOLIC BLOOD PRESSURE: 70 MMHG | RESPIRATION RATE: 16 BRPM | TEMPERATURE: 96.4 F | SYSTOLIC BLOOD PRESSURE: 116 MMHG

## 2022-09-08 DIAGNOSIS — Z51.6 ENCOUNTER FOR DESENSITIZATION TO ALLERGENS: Primary | ICD-10-CM

## 2022-09-08 DIAGNOSIS — J30.1 ALLERGY TO TREES: ICD-10-CM

## 2022-09-08 DIAGNOSIS — J30.81 ALLERGY TO CATS: ICD-10-CM

## 2022-09-08 PROCEDURE — 95117 IMMUNOTHERAPY INJECTIONS: CPT | Performed by: NURSE PRACTITIONER

## 2022-09-08 NOTE — PROGRESS NOTES
After consent obtained/verified, allergy injection given in back of R/L arm(s). VIAL COLOR OF ALL VIALS TODAY IS yellow    ALLERGY INJECTION FROM VIAL A GIVEN left  UPPER ARM IN THE AMOUNT OF 0.50 ML    ALLERGY INJECTION FROM VIAL B GIVEN right lower ARM IN THE AMOUNT OF 0.50 ML    ALLERGY INJECTION FROM VIAL C GIVEN right upperARM IN THE AMOUNT OF 0.50 ML      Documentation of vial injection specific to arm(s) noted on Allergy Immunotherapy Administration Form. Patient waited 30 minutes for observation. No      Patient tolerated well without adverse reaction WHILE IN OFFICE    SHOT REACTION TREATMENT INSTRUCTIONS    During the 30 minute wait after an allergy injection the following symptoms should be reported:    Itching other than at the injection site  Hives or swelling other than at the injection site  Redness other than at the injection site  Difficulty breathing  Chest tightness  Difficulty swallowing  Throat tightness    If these symptoms occur, NOTIFY PROVIDER and the following treatment should be administered:    1. Epinephrine/Auvi Q 1:1000 IM - 0.3 ml if > 66 lbs or more, 0.15 ml if 33 - 63 lbs, or 0.1 ml if <33 lbs     2. Diphenhydramine - give all intramuscular:     2 to <6 years (off-label use): 6.25 mg,    6 to <12 years: 12.5 to 25 mg;    ?12 years: 25-50 mg.    3.  Famotidine:  Adults 40 mg oral    Adolescents age 12 years and >88 lbs: 40 mg    Children and Adolescents ? 12years of age: Initial: 0.25 mg/kg/dose  every 12 hours (maximum daily dose: 40 mg/day)    Epi/Auvi Q dose may me repeated in 5-15 minutes if adequate resolution of symptoms does not occur    Patient should be observed for at least one hour after final Epi/Auvi Q dose and must be seen by provider. Patients cannot drive themselves if they have received diphenhydramine.

## 2022-09-15 ENCOUNTER — NURSE ONLY (OUTPATIENT)
Dept: ALLERGY | Age: 47
End: 2022-09-15
Payer: COMMERCIAL

## 2022-09-15 VITALS
RESPIRATION RATE: 16 BRPM | OXYGEN SATURATION: 98 % | HEART RATE: 86 BPM | TEMPERATURE: 96.7 F | DIASTOLIC BLOOD PRESSURE: 72 MMHG | SYSTOLIC BLOOD PRESSURE: 116 MMHG

## 2022-09-15 DIAGNOSIS — J30.81 ALLERGY TO CATS: ICD-10-CM

## 2022-09-15 DIAGNOSIS — Z51.6 ENCOUNTER FOR DESENSITIZATION TO ALLERGENS: Primary | ICD-10-CM

## 2022-09-15 DIAGNOSIS — J30.1 ALLERGY TO TREES: ICD-10-CM

## 2022-09-15 PROCEDURE — 95117 IMMUNOTHERAPY INJECTIONS: CPT | Performed by: NURSE PRACTITIONER

## 2022-09-15 NOTE — PROGRESS NOTES
After consent obtained/verified, allergy injection given in back of R/L arm(s). VIAL COLOR OF ALL VIALS TODAY IS red    ALLERGY INJECTION FROM VIAL A GIVEN right  UPPER ARM IN THE AMOUNT OF 0.05 ML    ALLERGY INJECTION FROM VIAL B GIVEN left lower ARM IN THE AMOUNT OF 0.05 ML    ALLERGY INJECTION FROM VIAL C GIVEN leftupper ARM IN THE AMOUNT OF 0.05 ML      Documentation of vial injection specific to arm(s) noted on Allergy Immunotherapy Administration Form. Patient waited 30 minutes for observation. No      Patient tolerated well without adverse reaction WHILE IN OFFICE    SHOT REACTION TREATMENT INSTRUCTIONS    During the 30 minute wait after an allergy injection the following symptoms should be reported:    Itching other than at the injection site  Hives or swelling other than at the injection site  Redness other than at the injection site  Difficulty breathing  Chest tightness  Difficulty swallowing  Throat tightness    If these symptoms occur, NOTIFY PROVIDER and the following treatment should be administered:    1. Epinephrine/Auvi Q 1:1000 IM - 0.3 ml if > 66 lbs or more, 0.15 ml if 33 - 63 lbs, or 0.1 ml if <33 lbs     2. Diphenhydramine - give all intramuscular:     2 to <6 years (off-label use): 6.25 mg,    6 to <12 years: 12.5 to 25 mg;    ?12 years: 25-50 mg.    3.  Famotidine:  Adults 40 mg oral    Adolescents age 12 years and >88 lbs: 40 mg    Children and Adolescents ? 12years of age: Initial: 0.25 mg/kg/dose  every 12 hours (maximum daily dose: 40 mg/day)    Epi/Auvi Q dose may me repeated in 5-15 minutes if adequate resolution of symptoms does not occur    Patient should be observed for at least one hour after final Epi/Auvi Q dose and must be seen by provider. Patients cannot drive themselves if they have received diphenhydramine.

## 2022-09-22 ENCOUNTER — NURSE ONLY (OUTPATIENT)
Dept: ALLERGY | Age: 47
End: 2022-09-22
Payer: COMMERCIAL

## 2022-09-22 VITALS
RESPIRATION RATE: 14 BRPM | HEART RATE: 71 BPM | SYSTOLIC BLOOD PRESSURE: 118 MMHG | TEMPERATURE: 98.1 F | OXYGEN SATURATION: 98 % | DIASTOLIC BLOOD PRESSURE: 66 MMHG

## 2022-09-22 DIAGNOSIS — J30.1 ALLERGY TO TREES: ICD-10-CM

## 2022-09-22 DIAGNOSIS — Z51.6 ENCOUNTER FOR DESENSITIZATION TO ALLERGENS: Primary | ICD-10-CM

## 2022-09-22 PROCEDURE — 95117 IMMUNOTHERAPY INJECTIONS: CPT | Performed by: NURSE PRACTITIONER

## 2022-09-22 NOTE — PROGRESS NOTES
After consent obtained/verified, allergy injection given in back of R/L arm(s). VIAL COLOR OF ALL VIALS TODAY IS RED    ALLERGY INJECTION FROM VIAL A GIVEN left  UPPER ARM IN THE AMOUNT OF 0.10 ML    ALLERGY INJECTION FROM VIAL B GIVEN right upper ARM IN THE AMOUNT OF 0.10 ML    ALLERGY INJECTION FROM VIAL C GIVEN right lower ARM IN THE AMOUNT OF 0.10 ML      Documentation of vial injection specific to arm(s) noted on Allergy Immunotherapy Administration Form. Patient waited 30 minutes for observation. No          SHOT REACTION TREATMENT INSTRUCTIONS    During the 30 minute wait after an allergy injection the following symptoms should be reported:    Itching other than at the injection site  Hives or swelling other than at the injection site  Redness other than at the injection site  Difficulty breathing  Chest tightness  Difficulty swallowing  Throat tightness    If these symptoms occur, NOTIFY PROVIDER and the following treatment should be administered:    1. Epinephrine/Auvi Q 1:1000 IM - 0.3 ml if > 66 lbs or more, 0.15 ml if 33 - 63 lbs, or 0.1 ml if <33 lbs     2. Diphenhydramine - give all intramuscular:     2 to <6 years (off-label use): 6.25 mg,    6 to <12 years: 12.5 to 25 mg;    ?12 years: 25-50 mg.    3.  Famotidine:  Adults 40 mg oral    Adolescents age 12 years and >88 lbs: 40 mg    Children and Adolescents ? 12years of age: Initial: 0.25 mg/kg/dose  every 12 hours (maximum daily dose: 40 mg/day)    Epi/Auvi Q dose may me repeated in 5-15 minutes if adequate resolution of symptoms does not occur    Patient should be observed for at least one hour after final Epi/Auvi Q dose and must be seen by provider. Patients cannot drive themselves if they have received diphenhydramine.

## 2022-09-28 ENCOUNTER — OFFICE VISIT (OUTPATIENT)
Dept: ALLERGY | Age: 47
End: 2022-09-28
Payer: COMMERCIAL

## 2022-09-28 ENCOUNTER — NURSE ONLY (OUTPATIENT)
Dept: ALLERGY | Age: 47
End: 2022-09-28
Payer: COMMERCIAL

## 2022-09-28 VITALS
DIASTOLIC BLOOD PRESSURE: 82 MMHG | OXYGEN SATURATION: 97 % | TEMPERATURE: 96.3 F | HEIGHT: 63 IN | HEART RATE: 16 BPM | BODY MASS INDEX: 31.24 KG/M2 | SYSTOLIC BLOOD PRESSURE: 118 MMHG | RESPIRATION RATE: 18 BRPM | WEIGHT: 176.3 LBS

## 2022-09-28 VITALS
DIASTOLIC BLOOD PRESSURE: 82 MMHG | OXYGEN SATURATION: 98 % | SYSTOLIC BLOOD PRESSURE: 118 MMHG | HEART RATE: 78 BPM | RESPIRATION RATE: 16 BRPM | TEMPERATURE: 97.3 F

## 2022-09-28 DIAGNOSIS — J30.9 ALLERGIC RHINOCONJUNCTIVITIS: ICD-10-CM

## 2022-09-28 DIAGNOSIS — J33.9 NASAL POLYPS: Primary | ICD-10-CM

## 2022-09-28 DIAGNOSIS — Z91.048 ALLERGY TO MOLD: ICD-10-CM

## 2022-09-28 DIAGNOSIS — H10.10 ALLERGIC RHINOCONJUNCTIVITIS: ICD-10-CM

## 2022-09-28 DIAGNOSIS — Z51.6 DESENSITIZATION TO ALLERGENS: Primary | ICD-10-CM

## 2022-09-28 DIAGNOSIS — J30.1 ALLERGY TO TREES: ICD-10-CM

## 2022-09-28 DIAGNOSIS — J33.9 NASAL POLYPOSIS: ICD-10-CM

## 2022-09-28 PROCEDURE — 99214 OFFICE O/P EST MOD 30 MIN: CPT | Performed by: NURSE PRACTITIONER

## 2022-09-28 PROCEDURE — 95117 IMMUNOTHERAPY INJECTIONS: CPT | Performed by: NURSE PRACTITIONER

## 2022-09-28 RX ORDER — FLUTICASONE PROPIONATE 93 UG/1
1 SPRAY, METERED NASAL 2 TIMES DAILY
Qty: 6.3 ML | Refills: 11 | Status: SHIPPED | OUTPATIENT
Start: 2022-09-28

## 2022-09-28 RX ORDER — MONTELUKAST SODIUM 10 MG/1
10 TABLET ORAL NIGHTLY
Qty: 90 TABLET | Refills: 3 | Status: SHIPPED | OUTPATIENT
Start: 2022-09-28

## 2022-09-28 ASSESSMENT — ENCOUNTER SYMPTOMS
COLOR CHANGE: 0
SHORTNESS OF BREATH: 0
VOMITING: 0
SORE THROAT: 0
RHINORRHEA: 0
NAUSEA: 0
APNEA: 0
VOICE CHANGE: 0
ABDOMINAL PAIN: 0
CHEST TIGHTNESS: 0
WHEEZING: 0
CHOKING: 0
DIARRHEA: 0
SINUS PRESSURE: 0
COUGH: 0
STRIDOR: 0
FACIAL SWELLING: 0
TROUBLE SWALLOWING: 0

## 2022-09-28 NOTE — PROGRESS NOTES
After consent obtained/verified, allergy injection given in back of R/L arm(s). VIAL COLOR OF ALL VIALS TODAY IS red    ALLERGY INJECTION FROM VIAL A GIVEN right  UPPER ARM IN THE AMOUNT OF 0.15 ML    ALLERGY INJECTION FROM VIAL B GIVEN left upper ARM IN THE AMOUNT OF 0.15 ML    ALLERGY INJECTION FROM VIAL C GIVEN leftlower ARM IN THE AMOUNT OF 0.15 ML      Documentation of vial injection specific to arm(s) noted on Allergy Immunotherapy Administration Form. Patient waited 30 minutes for observation. Yes      Patient tolerated well without adverse reaction WHILE IN OFFICE    SHOT REACTION TREATMENT INSTRUCTIONS    During the 30 minute wait after an allergy injection the following symptoms should be reported:    Itching other than at the injection site  Hives or swelling other than at the injection site  Redness other than at the injection site  Difficulty breathing  Chest tightness  Difficulty swallowing  Throat tightness    If these symptoms occur, NOTIFY PROVIDER and the following treatment should be administered:    1. Epinephrine/Auvi Q 1:1000 IM - 0.3 ml if > 66 lbs or more, 0.15 ml if 33 - 63 lbs, or 0.1 ml if <33 lbs     2. Diphenhydramine - give all intramuscular:     2 to <6 years (off-label use): 6.25 mg,    6 to <12 years: 12.5 to 25 mg;    ?12 years: 25-50 mg.    3.  Famotidine:  Adults 40 mg oral    Adolescents age 12 years and >88 lbs: 40 mg    Children and Adolescents ? 12years of age: Initial: 0.25 mg/kg/dose  every 12 hours (maximum daily dose: 40 mg/day)    Epi/Auvi Q dose may me repeated in 5-15 minutes if adequate resolution of symptoms does not occur    Patient should be observed for at least one hour after final Epi/Auvi Q dose and must be seen by provider. Patients cannot drive themselves if they have received diphenhydramine.

## 2022-09-28 NOTE — PROGRESS NOTES
@[facility]    Allergy & Asthma   [address] [facility], [address]  Ph:   [phone]  Fax:[phone]    Provider:  Dr. Carmen Pires:   Chief Complaint   Patient presents with    Follow-up     Allergy injections follow up. Dupixent follow up. Allergy management. HISTORY OF PRESENT ILLNESS: ESTABLISHED PATIENT HERE FOR EVALUATION   26-year-old  female here today for follow-up on allergies, immunotherapy and nasal polyps. Patient states she does need refill on several her medications including Singulair and Dupixent. She also needs refill on Xhance. She denies any nausea, vomiting, fever today. Patient states she has done extremely well on Dupixent and is controlled her nasal polyps. She is very happy that the combination of Dupixent and Xhance and neither 1 has caused her to have any problems or side effects. Patient reports that she does not feel like she has a loss of smell or a lot of nasal congestion related to nasal polyps. She attributes this effectiveness and increasing her quality of life due to the [address] and Lyndol Myers she is taking. Immunotherapy is also working for her allergies. She has not been as sick while on the immunotherapy and has not complained of any nasal or sinus symptoms. Patient had chronic allergies for years. She started on immunotherapy and has not had any problems while receiving the allergy injections. Review of Systems:  Review of Systems   Constitutional:  Negative for activity change, appetite change, chills, diaphoresis, fatigue, fever and unexpected weight change. HENT:  Positive for postnasal drip. Negative for congestion, dental problem, ear discharge, ear pain, facial swelling, hearing loss, mouth sores, nosebleeds, rhinorrhea, sinus pressure, sneezing, sore throat, tinnitus, trouble swallowing and voice change. Eyes:  Negative for visual disturbance.    Respiratory:  Negative for apnea, cough, choking, chest tightness, shortness of breath, wheezing and stridor. Cardiovascular:  Negative for chest pain, palpitations and leg swelling. Gastrointestinal:  Negative for abdominal pain, diarrhea, nausea and vomiting. Endocrine: Negative for cold intolerance, heat intolerance, polydipsia and polyuria. Genitourinary:  Negative for difficulty urinating, dysuria, enuresis, hematuria and urgency. Musculoskeletal:  Negative for arthralgias, gait problem, neck pain and neck stiffness. Skin:  Negative for color change and rash. Allergic/Immunologic: Positive for environmental allergies and food allergies. Negative for immunocompromised state. Neurological:  Negative for dizziness, syncope, facial asymmetry, speech difficulty, light-headedness and headaches. Hematological:  Negative for adenopathy. Does not bruise/bleed easily. Psychiatric/Behavioral:  Negative for confusion and sleep disturbance. The patient is not nervous/anxious. All other systems reviewed and are negative.       Past MedicalHistory:    Past Medical History:   Diagnosis Date    Asthma     COVID 01/09/2022    Panic attacks 4/98    PONV (postoperative nausea and vomiting)     Scopalamine patch    Seasonal allergies 4/99    Sinusitis, chronic        Past Surgical History:  Past Surgical History:   Procedure Laterality Date    CYST REMOVAL      INTRAUTERINE DEVICE INSERTION      murena-  Out 2014    SINUS ENDOSCOPY  04/2021    dr Mackenzie Jean-Baptiste Bilateral 04/30/2021    SINUS ENDOSCOPY FUNCTIONAL SURGERY IMAGE GUIDED, RIGHT MAXILLARY ANTROSTOMY WITH REMOVAL OF TISSUE FROM MAXILLARY SINUS, RIGHT PARTIAL ANTERIOR ETHMOIDECTOMY, NASAL ENDOSCOPY OF LEFT ABBEY BULLOSA performed by Swapna Mclean MD at [address] [facility] Bilateral 2/4/2022    IMAGE GUIDED ENDOSCOPY SURGERY BILATERAL ETHMOIDECTOMY(OSSEOUS RIGHT ETHMOID AIR CELLS OPACIFIED LEFT ETHMOID AIR CELL) RIGHT NASAL ANTRAL WINDOW performed by Annabella Habermann, MD at [address] [facility] Bilateral 5/5/2022    Image Guided Surgery for Right Frontal Sinus Exploration with Removal of Tissue, Right Nasal Antral Window Revision Left Ethmoidectomy Revision performed by Annabella Habermann, MD at [address] [facility]  02/04/2022    dr Ag Wolfe  07/08/2014    Dr Luciano Carey; Removal IUD       Family History:   Family History   Problem Relation Age of Onset    Breast Cancer Mother 52    Cancer Mother     Breast Cancer Maternal Grandmother 48    Cancer Maternal Grandmother     High Blood Pressure Maternal Grandmother     Stroke Maternal Grandmother     Diabetes Maternal Grandfather     Heart Disease Neg Hx        Social History:   Social History     Tobacco Use    Smoking status: Never    Smokeless tobacco: Never   Substance Use Topics    Alcohol use: Yes     Comment: occasinally        Allergies:  [place] [macadamia nut oil] and Seasonal    CurrentMedications:     Current Outpatient Medications:     Fluticasone Propionate (Shawn Brinks) 93 MCG/ACT EXHU, 1 spray by Nasal route 2 times daily, Disp: 6.3 mL, Rfl: 11    montelukast (SINGULAIR) 10 MG tablet, Take 1 tablet by mouth nightly, Disp: 90 tablet, Rfl: 3    dupilumab (DUPIXENT) 300 MG/2ML SOSY injection, Inject 2 mLs into the skin every 14 days, Disp: 2 each, Rfl: 5    ketotifen (ZADITOR) 0.025 % ophthalmic solution, One drop in each eye twice daily as needed, Disp: 1 each, Rfl: 2    ALLERGEN EXTRACT, Inject as directed Twice a Week , Disp: , Rfl:     meclizine (ANTIVERT) 25 MG tablet, Take 1 tablet by mouth 4 times daily as needed for Dizziness, Disp: 40 tablet, Rfl: 1    ZINC PO, Take by mouth daily , Disp: , Rfl:     chlorpheniramine (CHLOR-TRIMETON) 2 MG/5ML syrup, Take 2 mg by mouth every 4 hours as needed for Allergies , Disp: , Rfl:     EPINEPHrine (AUVI-Q) 0.3 MG/0.3ML SOAJ injection, Dispense 2 packs of 2 (total 4 devices). Use as directed, STAT for allergic reaction. , Disp: 2 each, sounds: Normal heart sounds. Pulmonary:      Effort: Pulmonary effort is normal.      Breath sounds: Normal breath sounds. Musculoskeletal:         General: Normal range of motion. Cervical back: Normal range of motion and neck supple. Skin:     General: Skin is warm and dry. Capillary Refill: Capillary refill takes less than 2 seconds. Neurological:      General: No focal deficit present. Mental Status: She is alert and oriented to person, place, and time. Mental status is at baseline. Psychiatric:         Mood and Affect: Mood normal.         Behavior: Behavior normal.         Thought Content: Thought content normal.         Judgment: Judgment normal.           DATA:  Lab Review:  CBC:   Lab Results   Component Value Date/Time    WBC 5.8 2021 12:00 AM    RBC 4.79 2021 12:00 AM    HGB 14.4 2021 12:00 AM    HCT 42.5 2021 12:00 AM    MCV 89 2021 12:00 AM    MCH 30.1 2021 12:00 AM    MCHC 33.9 2021 12:00 AM     2021 12:00 AM     BMP:    Lab Results   Component Value Date/Time    GLUCOSE 90 2021 12:00 AM     2021 12:00 AM    K 4.2 2021 12:00 AM     2021 12:00 AM    CO2 22 2021 12:00 AM    BUN 8 2021 12:00 AM    CREATININE 0.74 2021 12:00 AM    CALCIUM 9.2 2021 12:00 AM          No results found for: IGE   No results found for: IGG  No results found for: IGA   No results found for: IGM    No results found for: DEIRDRE   No results found for: RF       No results found for this or any previous visit. No results found for this or any previous visit. All current and previous medial labs have been reviewed and discussed with patient         Procedures: Allergy Testin2021    Assessment/Orders:    Diagnosis Orders   1.  Nasal polyps  Fluticasone Propionate (XHANCE) 93 MCG/ACT EXHU    montelukast (SINGULAIR) 10 MG tablet    dupilumab (DUPIXENT) 300 MG/2ML SOSY injection 2. Allergic rhinoconjunctivitis        3. Nasal polyposis            All diagnostic imaging  have been personally reviewed     Plan:  Follow Up:6 months    Patient to continue [facility]  Patient to continue [facility] for now. I have encouraged her to talk to Dr. Dane Clarke when she sees him following her next CT scan. She did have a question regarding a nasal perforation in her septum that was discovered during surgery. I was unable to visualize any perforation today and asked her to follow-up with Dr. Dane Clarke especially since she is on [facility]. Patient states that she would    Patient to continue allergy immunotherapy. She has done quite well while on this regime    Spent 30 minutes of face-to-face time with the patient with well more than half of the visit being dedicated to the discussion of the various symptom problems, provided education of medications and disease process, as well as discussion of a therapeutic plan for each. Face-to-face education time does not include any time that may have been spent for procedures.     (Please note that portions of this note may have been completed with a voice recognition program.  Efforts were made to edit the dictation but occasionally words are mis-transcribed.)         Signed:  RICH Wiley CNP  9/28/2022  9:50 AM

## 2022-10-05 ENCOUNTER — NURSE ONLY (OUTPATIENT)
Dept: ALLERGY | Age: 47
End: 2022-10-05
Payer: COMMERCIAL

## 2022-10-05 VITALS
OXYGEN SATURATION: 96 % | HEART RATE: 131 BPM | DIASTOLIC BLOOD PRESSURE: 80 MMHG | TEMPERATURE: 97.1 F | SYSTOLIC BLOOD PRESSURE: 120 MMHG

## 2022-10-05 DIAGNOSIS — Z51.6 DESENSITIZATION TO ALLERGENS: Primary | ICD-10-CM

## 2022-10-05 DIAGNOSIS — J30.1 ALLERGY TO TREES: ICD-10-CM

## 2022-10-05 DIAGNOSIS — Z91.048 ALLERGY TO MOLD: ICD-10-CM

## 2022-10-05 PROCEDURE — 95117 IMMUNOTHERAPY INJECTIONS: CPT | Performed by: NURSE PRACTITIONER

## 2022-10-11 ENCOUNTER — NURSE ONLY (OUTPATIENT)
Dept: ALLERGY | Age: 47
End: 2022-10-11
Payer: COMMERCIAL

## 2022-10-11 VITALS
RESPIRATION RATE: 16 BRPM | DIASTOLIC BLOOD PRESSURE: 80 MMHG | SYSTOLIC BLOOD PRESSURE: 126 MMHG | HEART RATE: 75 BPM | OXYGEN SATURATION: 97 % | TEMPERATURE: 97.3 F

## 2022-10-11 DIAGNOSIS — Z91.048 ALLERGY TO MOLD: ICD-10-CM

## 2022-10-11 DIAGNOSIS — Z51.6 DESENSITIZATION TO ALLERGENS: Primary | ICD-10-CM

## 2022-10-11 DIAGNOSIS — J30.1 ALLERGY TO TREES: ICD-10-CM

## 2022-10-11 PROCEDURE — 95117 IMMUNOTHERAPY INJECTIONS: CPT | Performed by: NURSE PRACTITIONER

## 2022-10-11 NOTE — PROGRESS NOTES
After consent obtained/verified, allergy injection given in back of R/L arm(s). VIAL COLOR OF ALL VIALS TODAY IS red    ALLERGY INJECTION FROM VIAL A GIVEN right  UPPER ARM IN THE AMOUNT OF 0.25 ML    ALLERGY INJECTION FROM VIAL B GIVEN left lower ARM IN THE AMOUNT OF 0.25 ML    ALLERGY INJECTION FROM VIAL C GIVEN leftupper ARM IN THE AMOUNT OF 0.25 ML      Documentation of vial injection specific to arm(s) noted on Allergy Immunotherapy Administration Form. Patient waited 30 minutes for observation. No      Patient tolerated well without adverse reaction WHILE IN OFFICE    SHOT REACTION TREATMENT INSTRUCTIONS    During the 30 minute wait after an allergy injection the following symptoms should be reported:    Itching other than at the injection site  Hives or swelling other than at the injection site  Redness other than at the injection site  Difficulty breathing  Chest tightness  Difficulty swallowing  Throat tightness    If these symptoms occur, NOTIFY PROVIDER and the following treatment should be administered:    1. Epinephrine/Auvi Q 1:1000 IM - 0.3 ml if > 66 lbs or more, 0.15 ml if 33 - 63 lbs, or 0.1 ml if <33 lbs     2. Diphenhydramine - give all intramuscular:     2 to <6 years (off-label use): 6.25 mg,    6 to <12 years: 12.5 to 25 mg;    ?12 years: 25-50 mg.    3.  Famotidine:  Adults 40 mg oral    Adolescents age 12 years and >88 lbs: 40 mg    Children and Adolescents ? 12years of age: Initial: 0.25 mg/kg/dose  every 12 hours (maximum daily dose: 40 mg/day)    Epi/Auvi Q dose may me repeated in 5-15 minutes if adequate resolution of symptoms does not occur    Patient should be observed for at least one hour after final Epi/Auvi Q dose and must be seen by provider. Patients cannot drive themselves if they have received diphenhydramine.

## 2022-10-20 ENCOUNTER — NURSE ONLY (OUTPATIENT)
Dept: ALLERGY | Age: 47
End: 2022-10-20
Payer: COMMERCIAL

## 2022-10-20 VITALS
HEART RATE: 72 BPM | SYSTOLIC BLOOD PRESSURE: 124 MMHG | TEMPERATURE: 96.6 F | DIASTOLIC BLOOD PRESSURE: 82 MMHG | OXYGEN SATURATION: 96 %

## 2022-10-20 DIAGNOSIS — J30.1 ALLERGY TO TREES: ICD-10-CM

## 2022-10-20 DIAGNOSIS — Z51.6 DESENSITIZATION TO ALLERGENS: Primary | ICD-10-CM

## 2022-10-20 DIAGNOSIS — Z91.048 ALLERGY TO MOLD: ICD-10-CM

## 2022-10-20 PROCEDURE — 95117 IMMUNOTHERAPY INJECTIONS: CPT | Performed by: NURSE PRACTITIONER

## 2022-10-20 NOTE — PROGRESS NOTES
After consent obtained/verified, allergy injection given in back of R/L arm(s). VIAL COLOR OF ALL VIALS TODAY IS red    ALLERGY INJECTION FROM VIAL A GIVEN left  UPPER ARM IN THE AMOUNT OF 0.30 ML    ALLERGY INJECTION FROM VIAL B GIVEN right upper ARM IN THE AMOUNT OF 0.30 ML    ALLERGY INJECTION FROM VIAL C GIVEN rightlower ARM IN THE AMOUNT OF 0.30 ML      Documentation of vial injection specific to arm(s) noted on Allergy Immunotherapy Administration Form. Patient waited 30 minutes for observation. No      Patient tolerated well without adverse reaction WHILE IN OFFICE    SHOT REACTION TREATMENT INSTRUCTIONS    During the 30 minute wait after an allergy injection the following symptoms should be reported:    Itching other than at the injection site  Hives or swelling other than at the injection site  Redness other than at the injection site  Difficulty breathing  Chest tightness  Difficulty swallowing  Throat tightness    If these symptoms occur, NOTIFY PROVIDER and the following treatment should be administered:    1. Epinephrine/Auvi Q 1:1000 IM - 0.3 ml if > 66 lbs or more, 0.15 ml if 33 - 63 lbs, or 0.1 ml if <33 lbs     2. Diphenhydramine - give all intramuscular:     2 to <6 years (off-label use): 6.25 mg,    6 to <12 years: 12.5 to 25 mg;    ?12 years: 25-50 mg.    3.  Famotidine:  Adults 40 mg oral    Adolescents age 12 years and >88 lbs: 40 mg    Children and Adolescents ? 12years of age: Initial: 0.25 mg/kg/dose  every 12 hours (maximum daily dose: 40 mg/day)    Epi/Auvi Q dose may me repeated in 5-15 minutes if adequate resolution of symptoms does not occur    Patient should be observed for at least one hour after final Epi/Auvi Q dose and must be seen by provider. Patients cannot drive themselves if they have received diphenhydramine.

## 2022-10-21 ENCOUNTER — OFFICE VISIT (OUTPATIENT)
Dept: FAMILY MEDICINE CLINIC | Age: 47
End: 2022-10-21

## 2022-10-21 VITALS
HEIGHT: 63 IN | WEIGHT: 177.38 LBS | DIASTOLIC BLOOD PRESSURE: 86 MMHG | HEART RATE: 76 BPM | RESPIRATION RATE: 18 BRPM | BODY MASS INDEX: 31.43 KG/M2 | SYSTOLIC BLOOD PRESSURE: 118 MMHG

## 2022-10-21 DIAGNOSIS — J30.2 SEASONAL ALLERGIES: ICD-10-CM

## 2022-10-21 DIAGNOSIS — R42 VERTIGO: ICD-10-CM

## 2022-10-21 DIAGNOSIS — Z00.00 WELL ADULT EXAM: ICD-10-CM

## 2022-10-21 DIAGNOSIS — J32.2 CHRONIC ETHMOIDITIS: ICD-10-CM

## 2022-10-21 DIAGNOSIS — J30.9 ALLERGIC RHINITIS, UNSPECIFIED SEASONALITY, UNSPECIFIED TRIGGER: ICD-10-CM

## 2022-10-21 DIAGNOSIS — J45.20 MILD INTERMITTENT ASTHMA WITHOUT COMPLICATION: ICD-10-CM

## 2022-10-21 DIAGNOSIS — Z23 NEED FOR INFLUENZA VACCINATION: Primary | ICD-10-CM

## 2022-10-21 DIAGNOSIS — Z12.31 SCREENING MAMMOGRAM FOR HIGH-RISK PATIENT: ICD-10-CM

## 2022-10-21 DIAGNOSIS — F41.0 PANIC ATTACKS: ICD-10-CM

## 2022-10-21 PROBLEM — M95.0 NASAL VALVE COLLAPSE: Status: RESOLVED | Noted: 2021-03-29 | Resolved: 2022-10-21

## 2022-10-21 PROBLEM — J01.20 ACUTE NON-RECURRENT ETHMOIDAL SINUSITIS: Status: RESOLVED | Noted: 2019-05-20 | Resolved: 2022-10-21

## 2022-10-21 PROBLEM — J34.89 NASAL VALVE COLLAPSE: Status: RESOLVED | Noted: 2021-03-29 | Resolved: 2022-10-21

## 2022-10-21 PROBLEM — J32.0 CHRONIC MAXILLARY SINUSITIS: Status: RESOLVED | Noted: 2021-04-05 | Resolved: 2022-10-21

## 2022-10-21 PROBLEM — J34.829 NASAL VALVE COLLAPSE: Status: RESOLVED | Noted: 2021-03-29 | Resolved: 2022-10-21

## 2022-10-21 PROBLEM — J32.4 CHRONIC PANSINUSITIS: Status: RESOLVED | Noted: 2021-03-29 | Resolved: 2022-10-21

## 2022-10-21 PROBLEM — J34.89 NASAL OBSTRUCTION: Status: RESOLVED | Noted: 2021-03-29 | Resolved: 2022-10-21

## 2022-10-21 PROBLEM — G47.33 OBSTRUCTIVE SLEEP APNEA: Status: RESOLVED | Noted: 2021-03-29 | Resolved: 2022-10-21

## 2022-10-21 PROCEDURE — 90688 IIV4 VACCINE SPLT 0.5 ML IM: CPT | Performed by: FAMILY MEDICINE

## 2022-10-21 PROCEDURE — 99396 PREV VISIT EST AGE 40-64: CPT | Performed by: FAMILY MEDICINE

## 2022-10-21 RX ORDER — MECLIZINE HYDROCHLORIDE 25 MG/1
25 TABLET ORAL 4 TIMES DAILY PRN
Qty: 40 TABLET | Refills: 1 | Status: SHIPPED | OUTPATIENT
Start: 2022-10-21

## 2022-10-21 SDOH — ECONOMIC STABILITY: FOOD INSECURITY: WITHIN THE PAST 12 MONTHS, YOU WORRIED THAT YOUR FOOD WOULD RUN OUT BEFORE YOU GOT MONEY TO BUY MORE.: NEVER TRUE

## 2022-10-21 SDOH — ECONOMIC STABILITY: FOOD INSECURITY: WITHIN THE PAST 12 MONTHS, THE FOOD YOU BOUGHT JUST DIDN'T LAST AND YOU DIDN'T HAVE MONEY TO GET MORE.: NEVER TRUE

## 2022-10-21 ASSESSMENT — ENCOUNTER SYMPTOMS
CHEST TIGHTNESS: 0
ABDOMINAL PAIN: 0
CONSTIPATION: 0
EYE PAIN: 0
COUGH: 0
NAUSEA: 0
WHEEZING: 0
SORE THROAT: 0
SHORTNESS OF BREATH: 0

## 2022-10-21 ASSESSMENT — PATIENT HEALTH QUESTIONNAIRE - PHQ9
2. FEELING DOWN, DEPRESSED OR HOPELESS: 0
SUM OF ALL RESPONSES TO PHQ QUESTIONS 1-9: 0
1. LITTLE INTEREST OR PLEASURE IN DOING THINGS: 0
SUM OF ALL RESPONSES TO PHQ QUESTIONS 1-9: 0
SUM OF ALL RESPONSES TO PHQ9 QUESTIONS 1 & 2: 0

## 2022-10-21 ASSESSMENT — SOCIAL DETERMINANTS OF HEALTH (SDOH): HOW HARD IS IT FOR YOU TO PAY FOR THE VERY BASICS LIKE FOOD, HOUSING, MEDICAL CARE, AND HEATING?: NOT HARD AT ALL

## 2022-10-21 NOTE — PROGRESS NOTES
CARE VISIT    Lashae MunozYoum  YOB: 1975    Date of Service:  10/21/2022    Chief Complaint:   Lashae Romeo is a 49 y.o. 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    Chronic pansinusitis 03/29/2021    Nasal obstruction 03/29/2021    Nasal valve collapse 03/29/2021    Obstructive sleep apnea 03/29/2021    Acute non-recurrent ethmoidal sinusitis 05/20/2019    Asthma     Seasonal allergies 04/01/1999    Panic attacks 04/01/1998       Preventive Care:  Last eye exam:  eye  2022  Exercise:  now  starting  Fracture within the past 6 months: no      Living will:    needs  a  will  power   of  health    Sinus  noted   saw Ent and  with  allergy  shots and  with   Mammogram   needed       Colonoscopy  now  due     Sridhar   noted   Review of Systems   Constitutional:  Negative for appetite change, fatigue and fever. HENT:  Negative for congestion, ear pain, postnasal drip and sore throat. Eyes:  Negative for pain and visual disturbance. Respiratory:  Negative for cough, chest tightness, shortness of breath and wheezing. Cardiovascular:  Negative for chest pain, palpitations and leg swelling. Gastrointestinal:  Negative for abdominal pain, constipation and nausea. Genitourinary:  Negative for dysuria and frequency. Musculoskeletal:  Negative for arthralgias, joint swelling, neck pain and neck stiffness. Skin:  Negative for rash.    Neurological:  Negative for dizziness, weakness, numbness and headaches. Hematological:  Negative for adenopathy. Does not bruise/bleed easily. Psychiatric/Behavioral:  Negative for behavioral problems and sleep disturbance. The patient is not nervous/anxious. Allergies   Allergen Reactions    Braymer [Macadamia Nut Oil]      Almonds per allergy testing    Seasonal      Prior to Visit Medications    Medication Sig Taking? Authorizing Provider   Fluticasone Propionate (XHANCE) 93 MCG/ACT EXHU 1 spray by Nasal route 2 times daily Yes RICH Bowen CNP   montelukast (SINGULAIR) 10 MG tablet Take 1 tablet by mouth nightly Yes RICH Bowen CNP   dupilumab (DUPIXENT) 300 MG/2ML SOSY injection Inject 2 mLs into the skin every 14 days Yes RICH Bowen CNP   ketotifen (ZADITOR) 0.025 % ophthalmic solution One drop in each eye twice daily as needed Yes RICH Bowen CNP   ALLERGEN EXTRACT Inject as directed once a week Yes Historical Provider, MD   meclizine (ANTIVERT) 25 MG tablet Take 1 tablet by mouth 4 times daily as needed for Dizziness Yes Luanne Westfall MD   ZINC PO Take by mouth daily  Yes Historical Provider, MD   chlorpheniramine (CHLOR-TRIMETON) 2 MG/5ML syrup Take 2 mg by mouth every 4 hours as needed for Allergies  Yes Historical Provider, MD   EPINEPHrine (AUVI-Q) 0.3 MG/0.3ML SOAJ injection Dispense 2 packs of 2 (total 4 devices). Use as directed, STAT for allergic reaction.  Yes RICH Bowen CNP   Multiple Vitamin (MULTI VITAMIN DAILY PO) Take by mouth daily  Yes Historical Provider, MD   Ascorbic Acid (VITAMIN C PO) Take by mouth daily  Yes Historical Provider, MD   VITAMIN D PO Take by mouth 2 times daily  Yes Historical Provider, MD   Probiotic Product (PROBIOTIC PO) Take by mouth 1 tab in morning Yes Historical Provider, MD   b complex vitamins capsule Take 1 capsule by mouth daily  Yes Historical Provider, MD   cetirizine (ZYRTEC) 10 MG tablet Take 10 mg by mouth nightly Not in last 10 days Yes Historical Provider, MD   Pseudoephedrine HCl (SUDAFED 12 HOUR PO) Take 1 tablet by mouth daily  Yes Historical Provider, MD       Past Medical History:   Diagnosis Date    Asthma     COVID 01/09/2022    Panic attacks 4/98    PONV (postoperative nausea and vomiting)     Scopalamine patch    Seasonal allergies 4/99    Sinusitis, chronic      Past Surgical History:   Procedure Laterality Date    CYST REMOVAL      INTRAUTERINE DEVICE INSERTION      murena-  Out 2014    SINUS ENDOSCOPY  04/2021    dr Janneth Carballo Bilateral 04/30/2021    SINUS ENDOSCOPY FUNCTIONAL SURGERY IMAGE GUIDED, RIGHT MAXILLARY ANTROSTOMY WITH REMOVAL OF TISSUE FROM MAXILLARY SINUS, RIGHT PARTIAL ANTERIOR ETHMOIDECTOMY, NASAL ENDOSCOPY OF LEFT ABBEY BULLOSA performed by Jose Arredondo MD at [address] Bilateral 2/4/2022    IMAGE GUIDED ENDOSCOPY SURGERY BILATERAL ETHMOIDECTOMY(OSSEOUS RIGHT ETHMOID AIR CELLS OPACIFIED LEFT ETHMOID AIR CELL) RIGHT NASAL ANTRAL WINDOW performed by Jose Arredondo MD at [address] Bilateral 5/5/2022    Image Guided Surgery for Right Frontal Sinus Exploration with Removal of Tissue, Right Nasal Antral Window Revision Left Ethmoidectomy Revision performed by Jose Arredondo MD at [address]  02/04/2022    dr Segundo Lerma  07/08/2014    Dr Yevgeniy Charlton; Removal IUD     Family History   Problem Relation Age of Onset    Breast Cancer Mother 52    Cancer Mother     Breast Cancer Maternal Grandmother 48    Cancer Maternal Grandmother     High Blood Pressure Maternal Grandmother     Stroke Maternal Grandmother     Diabetes Maternal Grandfather     Heart Disease Neg Hx      Social History     Socioeconomic History    Marital status:      Spouse name: Not on file    Number of children: Not on file    Years of education: Not on file    Highest education level: Not on file   Occupational History    Not on file   Tobacco Use    Smoking status: Never    Smokeless tobacco: Never   Vaping Use    Vaping Use: Never used   Substance and Sexual Activity    Alcohol use: Yes     Comment: occasinally    Drug use: No    Sexual activity: Yes     Partners: Male   Other Topics Concern    Not on file   Social History Narrative    Not on file     Social Determinants of Health     Financial Resource Strain: Low Risk     Difficulty of Paying Living Expenses: Not hard at all   Food Insecurity: No Food Insecurity    Worried About Running Out of Food in the Last Year: Never true    Ran Out of Food in the Last Year: Never true   Transportation Needs: Not on file   Physical Activity: Not on file   Stress: Not on file   Social Connections: Not on file   Intimate Partner Violence: Not on file   Housing Stability: Not on file       Wt Readings from Last 3 Encounters:   10/21/22 177 lb 6 oz (80.5 kg)   09/28/22 176 lb 4.8 oz (80 kg)   08/24/22 174 lb (78.9 kg)     BP Readings from Last 3 Encounters:   10/21/22 118/86   10/20/22 124/82   10/11/22 126/80        Vitals:    10/21/22 1257   BP: 118/86   Site: Right Upper Arm   Position: Sitting   Cuff Size: Medium Adult   Pulse: 76   Resp: 18   Weight: 177 lb 6 oz (80.5 kg)   Height: 5' 3\" (1.6 m)     Body mass index is 31.42 kg/m².     Physical Exam    Lipid panel:   Lab Results   Component Value Date    CHOL 164 03/05/2021    TRIG 71 03/05/2021    HDL 58 03/05/2021    LDLCALC 92 03/05/2021     Glucose:   Glucose (mg/dL)   Date Value   03/05/2021 90       Patient Care Team:  Faby Becker MD as PCP - General (Family Medicine)  Faby Becker MD [facility] Empaneled Provider    Immunization History   Administered Date(s) Administered    COVID-19, PFIZER GRAY top, DO NOT Dilute, (age 15 y+), IM, 30 mcg/0.3 mL 07/26/2022    COVID-19, PFIZER PURPLE top, DILUTE for use, (age 15 y+), 30mcg/0.3mL 02/21/2021, 03/14/2021, 11/07/2021    Influenza 10/22/2013    Influenza Virus Vaccine 10/30/2014, 10/22/2015 Influenza, AFLURIA (age 1 yrs+), FLUZONE, (age 10 mo+), MDV, 0.5mL 10/28/2016, 10/21/2020    Influenza, FLUARIX, FLULAVAL, FLUZONE (age 10 mo+) AND AFLURIA, (age 1 y+), PF, 0.5mL 10/21/2018    Influenza, FLUCELVAX, (age 10 mo+), MDCK, MDV, 0.5mL 10/29/2019    Influenza, FLUCELVAX, (age 10 mo+), MDCK, PF, 0.5mL 10/13/2017, 11/16/2021       Health Maintenance Due   Topic Date Due    Pneumococcal 0-64 years Vaccine (1 - PCV) Never done    Hepatitis C screen  Never done    DTaP/Tdap/Td vaccine (1 - Tdap) Never done    Cervical cancer screen  Never done    Colorectal Cancer Screen  Never done    Depression Screen  02/04/2022    Flu vaccine (1) 08/01/2022    COVID-19 Vaccine (5 - Booster for Pfizer series) 09/20/2022        Assessment/      ICD-10-CM    1. Well adult exam  Z00.00       2. Vertigo  R42 meclizine (ANTIVERT) 25 MG tablet      3. Panic attacks  F41.0 DISCONTINUED: sertraline (ZOLOFT) 50 MG tablet      4. Allergic rhinitis, unspecified seasonality, unspecified trigger  J30.9       5. Mild intermittent asthma without complication  H76.91       6. Seasonal allergies  J30.2       7.  Chronic ethmoiditis  J32.2              Plan:     Current Outpatient Medications   Medication Sig Dispense Refill    meclizine (ANTIVERT) 25 MG tablet Take 1 tablet by mouth 4 times daily as needed for Dizziness 40 tablet 1    Fluticasone Propionate (XHANCE) 93 MCG/ACT EXHU 1 spray by Nasal route 2 times daily 6.3 mL 11    montelukast (SINGULAIR) 10 MG tablet Take 1 tablet by mouth nightly 90 tablet 3    dupilumab (DUPIXENT) 300 MG/2ML SOSY injection Inject 2 mLs into the skin every 14 days 2 each 5    ketotifen (ZADITOR) 0.025 % ophthalmic solution One drop in each eye twice daily as needed 1 each 2    ALLERGEN EXTRACT Inject as directed once a week      ZINC PO Take by mouth daily       chlorpheniramine (CHLOR-TRIMETON) 2 MG/5ML syrup Take 2 mg by mouth every 4 hours as needed for Allergies       EPINEPHrine (AUVI-Q) 0.3 MG/0.3ML SOAJ injection Dispense 2 packs of 2 (total 4 devices). Use as directed, STAT for allergic reaction. 2 each 0    Multiple Vitamin (MULTI VITAMIN DAILY PO) Take by mouth daily       Ascorbic Acid (VITAMIN C PO) Take by mouth daily       VITAMIN D PO Take by mouth 2 times daily       Probiotic Product (PROBIOTIC PO) Take by mouth 1 tab in morning      b complex vitamins capsule Take 1 capsule by mouth daily       cetirizine (ZYRTEC) 10 MG tablet Take 10 mg by mouth nightly Not in last 10 days      Pseudoephedrine HCl (SUDAFED 12 HOUR PO) Take 1 tablet by mouth daily        No current facility-administered medications for this visit. Outpatient Encounter Medications as of 10/21/2022   Medication Sig Dispense Refill    meclizine (ANTIVERT) 25 MG tablet Take 1 tablet by mouth 4 times daily as needed for Dizziness 40 tablet 1    Fluticasone Propionate (XHANCE) 93 MCG/ACT EXHU 1 spray by Nasal route 2 times daily 6.3 mL 11    montelukast (SINGULAIR) 10 MG tablet Take 1 tablet by mouth nightly 90 tablet 3    dupilumab (DUPIXENT) 300 MG/2ML SOSY injection Inject 2 mLs into the skin every 14 days 2 each 5    ketotifen (ZADITOR) 0.025 % ophthalmic solution One drop in each eye twice daily as needed 1 each 2    ALLERGEN EXTRACT Inject as directed once a week      ZINC PO Take by mouth daily       chlorpheniramine (CHLOR-TRIMETON) 2 MG/5ML syrup Take 2 mg by mouth every 4 hours as needed for Allergies       EPINEPHrine (AUVI-Q) 0.3 MG/0.3ML SOAJ injection Dispense 2 packs of 2 (total 4 devices). Use as directed, STAT for allergic reaction.  2 each 0    Multiple Vitamin (MULTI VITAMIN DAILY PO) Take by mouth daily       Ascorbic Acid (VITAMIN C PO) Take by mouth daily       VITAMIN D PO Take by mouth 2 times daily       Probiotic Product (PROBIOTIC PO) Take by mouth 1 tab in morning      b complex vitamins capsule Take 1 capsule by mouth daily       cetirizine (ZYRTEC) 10 MG tablet Take 10 mg by mouth nightly Not in last 10 days Pseudoephedrine HCl (SUDAFED 12 HOUR PO) Take 1 tablet by mouth daily       [DISCONTINUED] sertraline (ZOLOFT) 50 MG tablet TAKE 1/2 TABLET BY MOUTH ONE TIME A DAY 90 tablet 1    [DISCONTINUED] meclizine (ANTIVERT) 25 MG tablet Take 1 tablet by mouth 4 times daily as needed for Dizziness 40 tablet 1     No facility-administered encounter medications on file as of 10/21/2022. Exers=csie   wean  sudafed and     colonoscopy      See in  6  mths d       No follow-ups on file.

## 2022-10-21 NOTE — PROGRESS NOTES
Immunizations Administered       Name Date Dose Route    Influenza, AFLURIA (age 1 yrs+), FLUZONE, (age 10 mo+), MDV, 0.5mL 10/21/2022 0.5 mL Intramuscular    Site: Deltoid- Left    Lot: [id number]    NDC: 61909-395-31

## 2022-10-27 ENCOUNTER — NURSE ONLY (OUTPATIENT)
Dept: ALLERGY | Age: 47
End: 2022-10-27
Payer: COMMERCIAL

## 2022-10-27 VITALS
HEART RATE: 84 BPM | OXYGEN SATURATION: 97 % | DIASTOLIC BLOOD PRESSURE: 80 MMHG | TEMPERATURE: 96.6 F | SYSTOLIC BLOOD PRESSURE: 122 MMHG

## 2022-10-27 DIAGNOSIS — Z91.048 ALLERGY TO MOLD: ICD-10-CM

## 2022-10-27 DIAGNOSIS — Z51.6 DESENSITIZATION TO ALLERGENS: Primary | ICD-10-CM

## 2022-10-27 DIAGNOSIS — J30.1 ALLERGY TO TREES: ICD-10-CM

## 2022-10-27 PROCEDURE — 95117 IMMUNOTHERAPY INJECTIONS: CPT | Performed by: NURSE PRACTITIONER

## 2022-10-27 NOTE — PROGRESS NOTES
After consent obtained/verified, allergy injection given in back of R/L arm(s). VIAL COLOR OF ALL VIALS TODAY IS red    ALLERGY INJECTION FROM VIAL A GIVEN right  UPPER ARM IN THE AMOUNT OF 0.35 ML    ALLERGY INJECTION FROM VIAL B GIVEN left lower ARM IN THE AMOUNT OF 0.35 ML    ALLERGY INJECTION FROM VIAL C GIVEN leftupper ARM IN THE AMOUNT OF 0.35 ML      Documentation of vial injection specific to arm(s) noted on Allergy Immunotherapy Administration Form. Patient waited 30 minutes for observation. No      Patient tolerated well without adverse reaction WHILE IN OFFICE    SHOT REACTION TREATMENT INSTRUCTIONS    During the 30 minute wait after an allergy injection the following symptoms should be reported:    Itching other than at the injection site  Hives or swelling other than at the injection site  Redness other than at the injection site  Difficulty breathing  Chest tightness  Difficulty swallowing  Throat tightness    If these symptoms occur, NOTIFY PROVIDER and the following treatment should be administered:    1. Epinephrine/Auvi Q 1:1000 IM - 0.3 ml if > 66 lbs or more, 0.15 ml if 33 - 63 lbs, or 0.1 ml if <33 lbs     2. Diphenhydramine - give all intramuscular:     2 to <6 years (off-label use): 6.25 mg,    6 to <12 years: 12.5 to 25 mg;    ?12 years: 25-50 mg.    3.  Famotidine:  Adults 40 mg oral    Adolescents age 12 years and >88 lbs: 40 mg    Children and Adolescents ? 12years of age: Initial: 0.25 mg/kg/dose  every 12 hours (maximum daily dose: 40 mg/day)    Epi/Auvi Q dose may me repeated in 5-15 minutes if adequate resolution of symptoms does not occur    Patient should be observed for at least one hour after final Epi/Auvi Q dose and must be seen by provider. Patients cannot drive themselves if they have received diphenhydramine.

## 2022-11-03 ENCOUNTER — NURSE ONLY (OUTPATIENT)
Dept: ALLERGY | Age: 47
End: 2022-11-03
Payer: COMMERCIAL

## 2022-11-03 VITALS
HEART RATE: 78 BPM | TEMPERATURE: 97.6 F | RESPIRATION RATE: 16 BRPM | SYSTOLIC BLOOD PRESSURE: 122 MMHG | OXYGEN SATURATION: 98 % | DIASTOLIC BLOOD PRESSURE: 70 MMHG

## 2022-11-03 DIAGNOSIS — J30.1 ALLERGY TO TREES: ICD-10-CM

## 2022-11-03 DIAGNOSIS — Z51.6 DESENSITIZATION TO ALLERGENS: Primary | ICD-10-CM

## 2022-11-03 DIAGNOSIS — Z91.048 ALLERGY TO MOLD: ICD-10-CM

## 2022-11-03 PROCEDURE — 95117 IMMUNOTHERAPY INJECTIONS: CPT | Performed by: NURSE PRACTITIONER

## 2022-11-03 NOTE — PROGRESS NOTES
After consent obtained/verified, allergy injection given in back of R/L arm(s). VIAL COLOR OF ALL VIALS TODAY IS red    ALLERGY INJECTION FROM VIAL A GIVEN left  UPPER ARM IN THE AMOUNT OF 0.40 ML    ALLERGY INJECTION FROM VIAL B GIVEN right lower ARM IN THE AMOUNT OF 0.40 ML    ALLERGY INJECTION FROM VIAL C GIVEN rightupper ARM IN THE AMOUNT OF 0.40 ML      Documentation of vial injection specific to arm(s) noted on Allergy Immunotherapy Administration Form. Patient waited 30 minutes for observation. No      Patient tolerated well without adverse reaction WHILE IN OFFICE    SHOT REACTION TREATMENT INSTRUCTIONS    During the 30 minute wait after an allergy injection the following symptoms should be reported:    Itching other than at the injection site  Hives or swelling other than at the injection site  Redness other than at the injection site  Difficulty breathing  Chest tightness  Difficulty swallowing  Throat tightness    If these symptoms occur, NOTIFY PROVIDER and the following treatment should be administered:    1. Epinephrine/Auvi Q 1:1000 IM - 0.3 ml if > 66 lbs or more, 0.15 ml if 33 - 63 lbs, or 0.1 ml if <33 lbs     2. Diphenhydramine - give all intramuscular:     2 to <6 years (off-label use): 6.25 mg,    6 to <12 years: 12.5 to 25 mg;    ?12 years: 25-50 mg.    3.  Famotidine:  Adults 40 mg oral    Adolescents age 12 years and >88 lbs: 40 mg    Children and Adolescents ? 12years of age: Initial: 0.25 mg/kg/dose  every 12 hours (maximum daily dose: 40 mg/day)    Epi/Auvi Q dose may me repeated in 5-15 minutes if adequate resolution of symptoms does not occur    Patient should be observed for at least one hour after final Epi/Auvi Q dose and must be seen by provider. Patients cannot drive themselves if they have received diphenhydramine.

## 2022-11-09 ENCOUNTER — NURSE ONLY (OUTPATIENT)
Dept: ALLERGY | Age: 47
End: 2022-11-09
Payer: COMMERCIAL

## 2022-11-09 VITALS
OXYGEN SATURATION: 97 % | HEART RATE: 72 BPM | RESPIRATION RATE: 16 BRPM | DIASTOLIC BLOOD PRESSURE: 76 MMHG | TEMPERATURE: 97.6 F | SYSTOLIC BLOOD PRESSURE: 118 MMHG

## 2022-11-09 DIAGNOSIS — J30.1 ALLERGY TO TREES: ICD-10-CM

## 2022-11-09 DIAGNOSIS — Z91.048 ALLERGY TO MOLD: ICD-10-CM

## 2022-11-09 DIAGNOSIS — Z51.6 DESENSITIZATION TO ALLERGENS: Primary | ICD-10-CM

## 2022-11-09 PROCEDURE — 95117 IMMUNOTHERAPY INJECTIONS: CPT | Performed by: NURSE PRACTITIONER

## 2022-11-09 NOTE — PROGRESS NOTES
After consent obtained/verified, allergy injection given in back of R/L arm(s). VIAL COLOR OF ALL VIALS TODAY IS red    ALLERGY INJECTION FROM VIAL A GIVEN right  UPPER ARM IN THE AMOUNT OF 0.45 ML    ALLERGY INJECTION FROM VIAL B GIVEN left upper ARM IN THE AMOUNT OF 0.45 ML    ALLERGY INJECTION FROM VIAL C GIVEN leftlower ARM IN THE AMOUNT OF 0.45 ML      Documentation of vial injection specific to arm(s) noted on Allergy Immunotherapy Administration Form. Patient waited 30 minutes for observation. No      Patient tolerated well without adverse reaction WHILE IN OFFICE    SHOT REACTION TREATMENT INSTRUCTIONS    During the 30 minute wait after an allergy injection the following symptoms should be reported:    Itching other than at the injection site  Hives or swelling other than at the injection site  Redness other than at the injection site  Difficulty breathing  Chest tightness  Difficulty swallowing  Throat tightness    If these symptoms occur, NOTIFY PROVIDER and the following treatment should be administered:    1. Epinephrine/Auvi Q 1:1000 IM - 0.3 ml if > 66 lbs or more, 0.15 ml if 33 - 63 lbs, or 0.1 ml if <33 lbs     2. Diphenhydramine - give all intramuscular:     2 to <6 years (off-label use): 6.25 mg,    6 to <12 years: 12.5 to 25 mg;    ?12 years: 25-50 mg.    3.  Famotidine:  Adults 40 mg oral    Adolescents age 12 years and >88 lbs: 40 mg    Children and Adolescents ? 12years of age: Initial: 0.25 mg/kg/dose  every 12 hours (maximum daily dose: 40 mg/day)    Epi/Auvi Q dose may me repeated in 5-15 minutes if adequate resolution of symptoms does not occur    Patient should be observed for at least one hour after final Epi/Auvi Q dose and must be seen by provider. Patients cannot drive themselves if they have received diphenhydramine.

## 2022-11-17 ENCOUNTER — NURSE ONLY (OUTPATIENT)
Dept: ALLERGY | Age: 47
End: 2022-11-17
Payer: COMMERCIAL

## 2022-11-17 VITALS
RESPIRATION RATE: 16 BRPM | DIASTOLIC BLOOD PRESSURE: 80 MMHG | OXYGEN SATURATION: 98 % | SYSTOLIC BLOOD PRESSURE: 120 MMHG | HEART RATE: 79 BPM | TEMPERATURE: 97 F

## 2022-11-17 DIAGNOSIS — J30.1 ALLERGY TO TREES: ICD-10-CM

## 2022-11-17 DIAGNOSIS — Z51.6 DESENSITIZATION TO ALLERGENS: Primary | ICD-10-CM

## 2022-11-17 DIAGNOSIS — Z91.048 ALLERGY TO MOLD: ICD-10-CM

## 2022-11-17 PROCEDURE — 95117 IMMUNOTHERAPY INJECTIONS: CPT | Performed by: NURSE PRACTITIONER

## 2022-11-17 NOTE — PROGRESS NOTES
After consent obtained/verified, allergy injection given in back of R/L arm(s). VIAL COLOR OF ALL VIALS TODAY IS red    ALLERGY INJECTION FROM VIAL A GIVEN left  UPPER ARM IN THE AMOUNT OF 0.50 ML    ALLERGY INJECTION FROM VIAL B GIVEN right upper ARM IN THE AMOUNT OF 0.50 ML    ALLERGY INJECTION FROM VIAL C GIVEN rightlower ARM IN THE AMOUNT OF 0.50 ML      Documentation of vial injection specific to arm(s) noted on Allergy Immunotherapy Administration Form. Patient waited 30 minutes for observation. No      Patient tolerated well without adverse reaction WHILE IN OFFICE    SHOT REACTION TREATMENT INSTRUCTIONS    During the 30 minute wait after an allergy injection the following symptoms should be reported:    Itching other than at the injection site  Hives or swelling other than at the injection site  Redness other than at the injection site  Difficulty breathing  Chest tightness  Difficulty swallowing  Throat tightness    If these symptoms occur, NOTIFY PROVIDER and the following treatment should be administered:    1. Epinephrine/Auvi Q 1:1000 IM - 0.3 ml if > 66 lbs or more, 0.15 ml if 33 - 63 lbs, or 0.1 ml if <33 lbs     2. Diphenhydramine - give all intramuscular:     2 to <6 years (off-label use): 6.25 mg,    6 to <12 years: 12.5 to 25 mg;    ?12 years: 25-50 mg.    3.  Famotidine:  Adults 40 mg oral    Adolescents age 12 years and >88 lbs: 40 mg    Children and Adolescents ? 12years of age: Initial: 0.25 mg/kg/dose  every 12 hours (maximum daily dose: 40 mg/day)    Epi/Auvi Q dose may me repeated in 5-15 minutes if adequate resolution of symptoms does not occur    Patient should be observed for at least one hour after final Epi/Auvi Q dose and must be seen by provider. Patients cannot drive themselves if they have received diphenhydramine.

## 2022-11-22 DIAGNOSIS — Z29.8 IMMUNOTHERAPY: ICD-10-CM

## 2022-11-22 DIAGNOSIS — J30.9 CHRONIC ALLERGIC RHINITIS: Primary | ICD-10-CM

## 2022-11-22 PROCEDURE — 95165 ANTIGEN THERAPY SERVICES: CPT | Performed by: NURSE PRACTITIONER

## 2022-11-22 NOTE — PROGRESS NOTES
ALLERGY EXTRACT MAINTENANCE MIXING RED VIAL ONLY  Pt's current vials were empty. New vials were mixed. Provider onsite during allergy extract preparation. TOTAL VIALS=  3  TOTAL DOSES PER VIAL = 20  TOTAL ANTICIPATED DOSES PREPARED = 60       An allergy extract maintenance solution was prepared in red vial according to the maintenance prescription. The refrigerator shelf-life for each red vial is 12 months or as indicated on the label. Patient vials were labeled with name, date-of-birth, vial number, concentration, and expiration. When injecting from a new maintenance vial, the first injections should be three doses below the previous maintenance dose. This is done because the refill may be slightly stronger than the vial that was just emptied. (Example: if the maintenance dose is 0.5, start at 0.35 and proceed to 0.4, 0.45, 0.5). The three build up injections may be given weekly until the maintenance dosage is reached again. Then every other week injections may be resumed. Patients on maintenance should be seen by the allergy provider every 6-12 months and as needed. The injection record and serum is reviewed and documented at every injection appointment. When down to the last 1/3 of the bottle, a maintenance refill will be prepared (pending patient is without reactions) for next refill. Patients requesting refills that receive injections at outside medical facilities must include the patient's demographic sheet, current insurance information and the injection records. Allow 2-3 weeks for delivery after the refill has been requested. PROTOCOL FOR LATE INJECTIONS  Days late determined from the due date of the injection    Shot Frequency 5-10 Days Late 11-16 Days Late 17-30 Days Late 31-60 Days Late 61+ Days Late   Twice Weekly Repeat last dose Reduce last dose . 2 Reduce last dose . 4 Dilute back 1 vial, start at .05 Patient must start over     Weekly Repeat last dose Reduce last dose .2 Reduce last dose . 4 Dilute back 1 vial, start at .05 Patient must start over   Every 2 Weeks Repeat last dose Reduce last dose . 2. Reduce last dose . 4 Ask provider for instruction Ask provider for instruction   Every 3 Weeks Repeat last dose Reduce last dose . 2 Reduce last dose . 4 Ask provider for instruction Ask provider for instruction   Every 4 Weeks Repeat last dose Reduce last dose . 2 Reduce last dose . 4 Ask provider for instruction Ask provider for instruction

## 2022-11-23 ENCOUNTER — NURSE ONLY (OUTPATIENT)
Dept: ALLERGY | Age: 47
End: 2022-11-23
Payer: COMMERCIAL

## 2022-11-23 VITALS
DIASTOLIC BLOOD PRESSURE: 70 MMHG | HEART RATE: 67 BPM | TEMPERATURE: 97.3 F | SYSTOLIC BLOOD PRESSURE: 118 MMHG | RESPIRATION RATE: 16 BRPM | OXYGEN SATURATION: 97 %

## 2022-11-23 DIAGNOSIS — J30.1 ALLERGY TO TREES: ICD-10-CM

## 2022-11-23 DIAGNOSIS — Z91.048 ALLERGY TO MOLD: ICD-10-CM

## 2022-11-23 DIAGNOSIS — Z51.6 DESENSITIZATION TO ALLERGENS: Primary | ICD-10-CM

## 2022-11-23 PROCEDURE — 95117 IMMUNOTHERAPY INJECTIONS: CPT | Performed by: NURSE PRACTITIONER

## 2022-11-23 NOTE — PROGRESS NOTES
After consent obtained/verified, allergy injection given in back of R/L arm(s). VIAL COLOR OF ALL VIALS TODAY IS RED    ALLERGY INJECTION FROM VIAL A GIVEN right  UPPER ARM IN THE AMOUNT OF 0.50 ML    ALLERGY INJECTION FROM VIAL B GIVEN left lower ARM IN THE AMOUNT OF 0.50 ML    ALLERGY INJECTION FROM VIAL C GIVEN leftupper ARM IN THE AMOUNT OF 0.50 ML      Documentation of vial injection specific to arm(s) noted on Allergy Immunotherapy Administration Form. Patient waited 30 minutes for observation. No      Patient tolerated well without adverse reaction WHILE IN OFFICE    SHOT REACTION TREATMENT INSTRUCTIONS    During the 30 minute wait after an allergy injection the following symptoms should be reported:    Itching other than at the injection site  Hives or swelling other than at the injection site  Redness other than at the injection site  Difficulty breathing  Chest tightness  Difficulty swallowing  Throat tightness    If these symptoms occur, NOTIFY PROVIDER and the following treatment should be administered:    1. Epinephrine/Auvi Q 1:1000 IM - 0.3 ml if > 66 lbs or more, 0.15 ml if 33 - 63 lbs, or 0.1 ml if <33 lbs     2. Diphenhydramine - give all intramuscular:     2 to <6 years (off-label use): 6.25 mg,    6 to <12 years: 12.5 to 25 mg;    ?12 years: 25-50 mg.    3.  Famotidine:  Adults 40 mg oral    Adolescents age 12 years and >88 lbs: 40 mg    Children and Adolescents ? 12years of age: Initial: 0.25 mg/kg/dose  every 12 hours (maximum daily dose: 40 mg/day)    Epi/Auvi Q dose may me repeated in 5-15 minutes if adequate resolution of symptoms does not occur    Patient should be observed for at least one hour after final Epi/Auvi Q dose and must be seen by provider. Patients cannot drive themselves if they have received diphenhydramine.

## 2022-12-01 ENCOUNTER — NURSE ONLY (OUTPATIENT)
Dept: ALLERGY | Age: 47
End: 2022-12-01
Payer: COMMERCIAL

## 2022-12-01 VITALS
TEMPERATURE: 97 F | HEART RATE: 76 BPM | OXYGEN SATURATION: 96 % | SYSTOLIC BLOOD PRESSURE: 126 MMHG | DIASTOLIC BLOOD PRESSURE: 80 MMHG | RESPIRATION RATE: 16 BRPM

## 2022-12-01 DIAGNOSIS — J30.1 ALLERGY TO TREES: ICD-10-CM

## 2022-12-01 DIAGNOSIS — Z91.048 ALLERGY TO MOLD: ICD-10-CM

## 2022-12-01 DIAGNOSIS — Z51.6 DESENSITIZATION TO ALLERGENS: Primary | ICD-10-CM

## 2022-12-01 PROCEDURE — 95117 IMMUNOTHERAPY INJECTIONS: CPT | Performed by: NURSE PRACTITIONER

## 2022-12-01 NOTE — PROGRESS NOTES
After consent obtained/verified, allergy injection given in back of R/L arm(s). VIAL COLOR OF ALL VIALS TODAY IS red    ALLERGY INJECTION FROM VIAL A GIVEN left  UPPER ARM IN THE AMOUNT OF 0.50 ML    ALLERGY INJECTION FROM VIAL B GIVEN right lower ARM IN THE AMOUNT OF 0.50 ML    ALLERGY INJECTION FROM VIAL C GIVEN rightupper ARM IN THE AMOUNT OF 0.50 ML      Documentation of vial injection specific to arm(s) noted on Allergy Immunotherapy Administration Form. Patient waited 30 minutes for observation. No      Patient tolerated well without adverse reaction WHILE IN OFFICE    SHOT REACTION TREATMENT INSTRUCTIONS    During the 30 minute wait after an allergy injection the following symptoms should be reported:    Itching other than at the injection site  Hives or swelling other than at the injection site  Redness other than at the injection site  Difficulty breathing  Chest tightness  Difficulty swallowing  Throat tightness    If these symptoms occur, NOTIFY PROVIDER and the following treatment should be administered:    1. Epinephrine/Auvi Q 1:1000 IM - 0.3 ml if > 66 lbs or more, 0.15 ml if 33 - 63 lbs, or 0.1 ml if <33 lbs     2. Diphenhydramine - give all intramuscular:     2 to <6 years (off-label use): 6.25 mg,    6 to <12 years: 12.5 to 25 mg;    ?12 years: 25-50 mg.    3.  Famotidine:  Adults 40 mg oral    Adolescents age 12 years and >88 lbs: 40 mg    Children and Adolescents ? 12years of age: Initial: 0.25 mg/kg/dose  every 12 hours (maximum daily dose: 40 mg/day)    Epi/Auvi Q dose may me repeated in 5-15 minutes if adequate resolution of symptoms does not occur    Patient should be observed for at least one hour after final Epi/Auvi Q dose and must be seen by provider. Patients cannot drive themselves if they have received diphenhydramine.

## 2022-12-07 ENCOUNTER — TELEPHONE (OUTPATIENT)
Dept: FAMILY MEDICINE CLINIC | Age: 47
End: 2022-12-07

## 2022-12-07 RX ORDER — BENZONATATE 200 MG/1
200 CAPSULE ORAL 3 TIMES DAILY PRN
Qty: 30 CAPSULE | Refills: 0 | Status: SHIPPED | OUTPATIENT
Start: 2022-12-07 | End: 2022-12-14

## 2022-12-07 RX ORDER — OSELTAMIVIR PHOSPHATE 75 MG/1
75 CAPSULE ORAL 2 TIMES DAILY
Qty: 10 CAPSULE | Refills: 0 | Status: SHIPPED | OUTPATIENT
Start: 2022-12-07 | End: 2022-12-12 | Stop reason: SINTOL

## 2022-12-07 NOTE — TELEPHONE ENCOUNTER
Pt called req an RX for Tamaflu and RX to help with cough. Pt is having cough,fever,body aches. Pt's daughter tested positive this weekend at urgent care for flu A and pt was around her this weekend.     [facility]

## 2022-12-07 NOTE — TELEPHONE ENCOUNTER
Date of last visit:  10/21/2022  Date of next visit:  4/24/2023    Requested Prescriptions     Signed Prescriptions Disp Refills    oseltamivir (TAMIFLU) 75 MG capsule 10 capsule 0     Sig: Take 1 capsule by mouth 2 times daily for 5 days     Authorizing Provider: Bard Mercedes     Ordering User: OPAL SANTIZO    benzonatate (TESSALON) 200 MG capsule 30 capsule 0     Sig: Take 1 capsule by mouth 3 times daily as needed for Cough     Authorizing Provider: Bard Mercedes     Ordering User: Fercho Doyle

## 2022-12-08 NOTE — PROGRESS NOTES
Subjective:      Patient ID: Miles Gaytan is a 54 y.o. female. Asthma  Noted         panic  Attack  Noted  zoloft   With  help     Headache  Noted        a    Sinus Problem   This is a new problem. The current episode started in the past 7 days. There has been no fever. Associated symptoms include congestion, sinus pressure and a sore throat (mild). Pertinent negatives include no coughing, ear pain, headaches, neck pain or shortness of breath. The treatment provided moderate relief. Past Medical History:   Diagnosis Date    Asthma     Panic attacks 4/98    Seasonal allergies 4/99     Review of Systems   Constitutional: Negative for appetite change, fatigue and fever. HENT: Positive for congestion, sinus pressure and sore throat (mild). Negative for ear pain and postnasal drip. Eyes: Negative for pain and visual disturbance. Respiratory: Negative for cough, chest tightness, shortness of breath and wheezing. Cardiovascular: Negative for chest pain, palpitations and leg swelling. Gastrointestinal: Negative for abdominal pain, constipation and nausea. Genitourinary: Negative for dysuria and frequency. Musculoskeletal: Negative for arthralgias, joint swelling, neck pain and neck stiffness. Skin: Negative for rash. Neurological: Negative for dizziness, weakness, numbness and headaches. Hematological: Negative for adenopathy. Does not bruise/bleed easily. Psychiatric/Behavioral: Negative for behavioral problems and sleep disturbance. The patient is not nervous/anxious. Objective:   Physical Exam  Vitals signs and nursing note reviewed. Constitutional:       Appearance: She is well-developed. HENT:      Head: Normocephalic and atraumatic. Comments:      Left  Ear  Drum  Not  Red  But  Flat      Right Ear: External ear normal.      Left Ear: Tympanic membrane and external ear normal.      Nose: Congestion present. Eyes:      General: No scleral icterus. 0.083% nebulizer solution Take 3 mLs by nebulization 4 times daily 1 Package 3    fluticasone (FLONASE) 50 MCG/ACT nasal spray USE ONE SPRAY(S) IN EACH NOSTRIL ONCE DAILY 16 g 5    cetirizine (ZYRTEC) 10 MG tablet Take 10 mg by mouth daily      Pseudoephedrine HCl (SUDAFED 12 HOUR PO) Take  by mouth. No current facility-administered medications for this visit. No orders of the defined types were placed in this encounter. No results found for this visit on 08/26/20.        increase  The  zoloft  If more  Anxiety    See in   6  mths  Connie Gupta MD room air

## 2022-12-09 DIAGNOSIS — J20.9 ACUTE BRONCHITIS WITH BRONCHOSPASM: ICD-10-CM

## 2022-12-09 DIAGNOSIS — J33.9 NASAL POLYPS: ICD-10-CM

## 2022-12-09 RX ORDER — ALBUTEROL SULFATE 90 UG/1
2 AEROSOL, METERED RESPIRATORY (INHALATION) EVERY 6 HOURS PRN
Qty: 1 EACH | Refills: 3 | Status: CANCELLED | OUTPATIENT
Start: 2022-12-09

## 2022-12-09 NOTE — TELEPHONE ENCOUNTER
Date of last visit:  10/21/2022  Date of next visit:  4/24/2023    Requested Prescriptions     Pending Prescriptions Disp Refills    albuterol (PROVENTIL) (2.5 MG/3ML) 0.083% nebulizer solution       Sig: Take 3 mLs by nebulization 4 times daily

## 2022-12-09 NOTE — TELEPHONE ENCOUNTER
Date of last visit:  10/21/2022  Date of next visit:  4/24/2023    Requested Prescriptions     Pending Prescriptions Disp Refills    albuterol sulfate HFA (VENTOLIN HFA) 108 (90 Base) MCG/ACT inhaler 1 each 3     Sig: Inhale 2 puffs into the lungs every 6 hours as needed for Wheezing

## 2022-12-10 RX ORDER — ALBUTEROL SULFATE 2.5 MG/3ML
2.5 SOLUTION RESPIRATORY (INHALATION) 4 TIMES DAILY
Qty: 120 ML | Refills: 3 | Status: SHIPPED | OUTPATIENT
Start: 2022-12-10

## 2022-12-12 ENCOUNTER — TELEMEDICINE (OUTPATIENT)
Dept: FAMILY MEDICINE CLINIC | Age: 47
End: 2022-12-12

## 2022-12-12 DIAGNOSIS — J01.20 ACUTE NON-RECURRENT ETHMOIDAL SINUSITIS: Primary | ICD-10-CM

## 2022-12-12 DIAGNOSIS — J45.20 MILD INTERMITTENT ASTHMA WITHOUT COMPLICATION: ICD-10-CM

## 2022-12-12 DIAGNOSIS — J30.9 ALLERGIC RHINITIS, UNSPECIFIED SEASONALITY, UNSPECIFIED TRIGGER: ICD-10-CM

## 2022-12-12 PROCEDURE — 99213 OFFICE O/P EST LOW 20 MIN: CPT | Performed by: FAMILY MEDICINE

## 2022-12-12 RX ORDER — AMOXICILLIN AND CLAVULANATE POTASSIUM 875; 125 MG/1; MG/1
1 TABLET, FILM COATED ORAL 2 TIMES DAILY
Qty: 20 TABLET | Refills: 0 | Status: SHIPPED | OUTPATIENT
Start: 2022-12-12 | End: 2022-12-22

## 2022-12-12 RX ORDER — GUAIFENESIN, PSEUDOEPHEDRINE HYDROCHLORIDE 600; 60 MG/1; MG/1
1 TABLET, EXTENDED RELEASE ORAL EVERY 12 HOURS
Qty: 20 TABLET | Refills: 1 | Status: SHIPPED | OUTPATIENT
Start: 2022-12-12 | End: 2022-12-22

## 2022-12-12 ASSESSMENT — ENCOUNTER SYMPTOMS
HOARSE VOICE: 1
SINUS PRESSURE: 1
SINUS COMPLAINT: 1
CHEST TIGHTNESS: 0
ABDOMINAL PAIN: 0
SHORTNESS OF BREATH: 0
NAUSEA: 0
SORE THROAT: 1
EYE PAIN: 0
CONSTIPATION: 0
WHEEZING: 0
COUGH: 0

## 2022-12-12 NOTE — PROGRESS NOTES
Lashae PERSAUD Virginia (:  1975) is a Established patient, here for evaluation of the following:    Assessment & Plan         ICD-10-CM    1. Acute non-recurrent ethmoidal sinusitis  J01.20 amoxicillin-clavulanate (AUGMENTIN) 875-125 MG per tablet     pseudoephedrine-guaiFENesin (MUCINEX D)  MG per extended release tablet      2. Allergic rhinitis, unspecified seasonality, unspecified trigger  J30.9       3. Mild intermittent asthma without complication  L43.69              PLAN    Current Outpatient Medications   Medication Sig Dispense Refill    amoxicillin-clavulanate (AUGMENTIN) 875-125 MG per tablet Take 1 tablet by mouth 2 times daily for 10 days 20 tablet 0    pseudoephedrine-guaiFENesin (MUCINEX D)  MG per extended release tablet Take 1 tablet by mouth in the morning and 1 tablet in the evening. Do all this for 10 days. 20 tablet 1    albuterol (PROVENTIL) (2.5 MG/3ML) 0.083% nebulizer solution Take 3 mLs by nebulization 4 times daily 120 mL 3    benzonatate (TESSALON) 200 MG capsule Take 1 capsule by mouth 3 times daily as needed for Cough 30 capsule 0    meclizine (ANTIVERT) 25 MG tablet Take 1 tablet by mouth 4 times daily as needed for Dizziness 40 tablet 1    Fluticasone Propionate (XHANCE) 93 MCG/ACT EXHU 1 spray by Nasal route 2 times daily 6.3 mL 11    montelukast (SINGULAIR) 10 MG tablet Take 1 tablet by mouth nightly 90 tablet 3    dupilumab (DUPIXENT) 300 MG/2ML SOSY injection Inject 2 mLs into the skin every 14 days 2 each 5    ketotifen (ZADITOR) 0.025 % ophthalmic solution One drop in each eye twice daily as needed 1 each 2    ALLERGEN EXTRACT Inject as directed once a week      ZINC PO Take by mouth daily       chlorpheniramine (CHLOR-TRIMETON) 2 MG/5ML syrup Take 2 mg by mouth every 4 hours as needed for Allergies       EPINEPHrine (AUVI-Q) 0.3 MG/0.3ML SOAJ injection Dispense 2 packs of 2 (total 4 devices). Use as directed, STAT for allergic reaction.  2 each 0    Multiple Vitamin (MULTI VITAMIN DAILY PO) Take by mouth daily       Ascorbic Acid (VITAMIN C PO) Take by mouth daily       VITAMIN D PO Take by mouth 2 times daily       Probiotic Product (PROBIOTIC PO) Take by mouth 1 tab in morning      b complex vitamins capsule Take 1 capsule by mouth daily       cetirizine (ZYRTEC) 10 MG tablet Take 10 mg by mouth nightly Not in last 10 days      Pseudoephedrine HCl (SUDAFED 12 HOUR PO) Take 1 tablet by mouth daily       sertraline (ZOLOFT) 50 MG tablet Take 50 mg by mouth daily (Patient not taking: Reported on 12/12/2022)       No current facility-administered medications for this visit. Augmentin and   mucinex-d     See if  persist          Subjective   Sinus Problem  This is a new problem. The current episode started in the past 7 days. The problem has been resolved since onset. The maximum temperature recorded prior to her arrival was 100.4 - 100.9 F. Associated symptoms include congestion, a hoarse voice, sinus pressure and a sore throat. Pertinent negatives include no coughing, diaphoresis, ear pain, headaches, neck pain or shortness of breath. Past treatments include nothing. The treatment provided mild relief. Past Medical History:   Diagnosis Date    Asthma     Chronic maxillary sinusitis 4/5/2021    COVID 01/09/2022    Panic attacks 4/98    PONV (postoperative nausea and vomiting)     Scopalamine patch    Seasonal allergies 4/99    Sinusitis, chronic         Review of Systems   Constitutional:  Negative for appetite change, diaphoresis, fatigue and fever. HENT:  Positive for congestion, hoarse voice, sinus pressure and sore throat. Negative for ear pain and postnasal drip. Eyes:  Negative for pain and visual disturbance. Respiratory:  Negative for cough, chest tightness, shortness of breath and wheezing. Cardiovascular:  Negative for chest pain, palpitations and leg swelling. Gastrointestinal:  Negative for abdominal pain, constipation and nausea. Genitourinary:  Negative for dysuria and frequency. Musculoskeletal:  Negative for arthralgias, joint swelling, neck pain and neck stiffness. Skin:  Negative for rash. Neurological:  Negative for dizziness, weakness, numbness and headaches. Hematological:  Negative for adenopathy. Does not bruise/bleed easily. Psychiatric/Behavioral:  Negative for behavioral problems and sleep disturbance. The patient is not nervous/anxious.          Objective   Patient-Reported Vitals  No data recorded     Physical Exam  [INSTRUCTIONS:  \"[x]\" Indicates a positive item  \"[]\" Indicates a negative item  -- DELETE ALL ITEMS NOT EXAMINED]    Constitutional: [x] Appears well-developed and well-nourished [x] No apparent distress      [] Abnormal -     Mental status: [x] Alert and awake  [x] Oriented to person/place/time [x] Able to follow commands    [] Abnormal -     Eyes:   EOM    [x]  Normal    [] Abnormal -   Sclera  [x]  Normal    [] Abnormal -          Discharge [x]  None visible   [] Abnormal -     HENT: [x] Normocephalic, atraumatic  [] Abnormal -   [x] Mouth/Throat: Mucous membranes are moist    External Ears [x] Normal  [] Abnormal -    Neck: [x] No visualized mass [] Abnormal -     Pulmonary/Chest: [x] Respiratory effort normal   [x] No visualized signs of difficulty breathing or respiratory distress        [] Abnormal -      Musculoskeletal:   [x] Normal gait with no signs of ataxia         [x] Normal range of motion of neck        [] Abnormal -     Neurological:        [x] No Facial Asymmetry (Cranial nerve 7 motor function) (limited exam due to video visit)          [x] No gaze palsy        [] Abnormal -          Skin:        [x] No significant exanthematous lesions or discoloration noted on facial skin         [] Abnormal -            Psychiatric:       [x] Normal Affect [] Abnormal -        [x] No Hallucinations    Other pertinent observable physical exam findings:-         On this date 12/12/2022 I have spent 20 minutes reviewing previous notes, test results and face to face (virtual) with the patient discussing the diagnosis and importance of compliance with the treatment plan as well as documenting on the day of the visit. Lashae Coleman, was evaluated through a synchronous (real-time) audio-video encounter. The patient (or guardian if applicable) is aware that this is a billable service, which includes applicable co-pays. This Virtual Visit was conducted with patient's (and/or legal guardian's) consent. The visit was conducted pursuant to the emergency declaration under the [address] [place] [address], [address] [place] [address] authority and the [employer] and [employer] software General Act. Patient identification was verified, and a caregiver was present when appropriate. The patient was located at Home: [address] [place] [address] [address] [address]. Provider was located at [facility] (Appt Dept): [facility],  [address]         --Samira Velasquez MD

## 2022-12-12 NOTE — PROGRESS NOTES
Lashae agreed to Video Chat/Exam in presence of Dr Tristen Quiroz and myself. Verified who was present in room with Lashae. Lashae informed the e-mail address used to Face Time cannot be used to contact the Provider, if they are any questions or concerns they need to call the office directly. Lashae stated understanding.

## 2022-12-15 ENCOUNTER — NURSE ONLY (OUTPATIENT)
Dept: ALLERGY | Age: 47
End: 2022-12-15
Payer: COMMERCIAL

## 2022-12-15 VITALS
DIASTOLIC BLOOD PRESSURE: 78 MMHG | TEMPERATURE: 98.1 F | SYSTOLIC BLOOD PRESSURE: 122 MMHG | RESPIRATION RATE: 16 BRPM | HEART RATE: 91 BPM | OXYGEN SATURATION: 97 %

## 2022-12-15 DIAGNOSIS — Z51.6 DESENSITIZATION TO ALLERGENS: Primary | ICD-10-CM

## 2022-12-15 DIAGNOSIS — J30.1 ALLERGY TO TREES: ICD-10-CM

## 2022-12-15 DIAGNOSIS — Z91.048 ALLERGY TO MOLD: ICD-10-CM

## 2022-12-15 PROCEDURE — 95117 IMMUNOTHERAPY INJECTIONS: CPT | Performed by: NURSE PRACTITIONER

## 2022-12-15 NOTE — PROGRESS NOTES
After consent obtained/verified, allergy injection given in back of R/L arm(s). VIAL COLOR OF ALL VIALS TODAY IS red    ALLERGY INJECTION FROM VIAL A GIVEN right  UPPER ARM IN THE AMOUNT OF 0.35 ML    ALLERGY INJECTION FROM VIAL B GIVEN left upper ARM IN THE AMOUNT OF 0.35 ML    ALLERGY INJECTION FROM VIAL C GIVEN leftlower ARM IN THE AMOUNT OF 0.35 ML      Documentation of vial injection specific to arm(s) noted on Allergy Immunotherapy Administration Form. Patient waited 30 minutes for observation. No      Patient tolerated well without adverse reaction WHILE IN OFFICE    SHOT REACTION TREATMENT INSTRUCTIONS    During the 30 minute wait after an allergy injection the following symptoms should be reported:    Itching other than at the injection site  Hives or swelling other than at the injection site  Redness other than at the injection site  Difficulty breathing  Chest tightness  Difficulty swallowing  Throat tightness    If these symptoms occur, NOTIFY PROVIDER and the following treatment should be administered:    1. Epinephrine/Auvi Q 1:1000 IM - 0.3 ml if > 66 lbs or more, 0.15 ml if 33 - 63 lbs, or 0.1 ml if <33 lbs     2. Diphenhydramine - give all intramuscular:     2 to <6 years (off-label use): 6.25 mg,    6 to <12 years: 12.5 to 25 mg;    ?12 years: 25-50 mg.    3.  Famotidine:  Adults 40 mg oral    Adolescents age 12 years and >88 lbs: 40 mg    Children and Adolescents ? 12years of age: Initial: 0.25 mg/kg/dose  every 12 hours (maximum daily dose: 40 mg/day)    Epi/Auvi Q dose may me repeated in 5-15 minutes if adequate resolution of symptoms does not occur    Patient should be observed for at least one hour after final Epi/Auvi Q dose and must be seen by provider. Patients cannot drive themselves if they have received diphenhydramine.

## 2022-12-19 ENCOUNTER — HOSPITAL ENCOUNTER (OUTPATIENT)
Dept: CT IMAGING | Age: 47
Discharge: HOME | End: 2022-12-19
Payer: COMMERCIAL

## 2022-12-19 DIAGNOSIS — J31.0 CHRONIC RHINOSINUSITIS: ICD-10-CM

## 2022-12-19 DIAGNOSIS — J32.9 CHRONIC RHINOSINUSITIS: ICD-10-CM

## 2022-12-19 DIAGNOSIS — J33.9 NASAL POLYPOSIS: ICD-10-CM

## 2022-12-19 PROCEDURE — 70486 CT MAXILLOFACIAL W/O DYE: CPT

## 2022-12-22 ENCOUNTER — NURSE ONLY (OUTPATIENT)
Dept: ALLERGY | Age: 47
End: 2022-12-22
Payer: COMMERCIAL

## 2022-12-22 VITALS
SYSTOLIC BLOOD PRESSURE: 124 MMHG | OXYGEN SATURATION: 97 % | TEMPERATURE: 98.2 F | HEART RATE: 69 BPM | DIASTOLIC BLOOD PRESSURE: 68 MMHG | RESPIRATION RATE: 18 BRPM

## 2022-12-22 DIAGNOSIS — Z91.048 ALLERGY TO MOLD: ICD-10-CM

## 2022-12-22 DIAGNOSIS — Z51.6 DESENSITIZATION TO ALLERGENS: Primary | ICD-10-CM

## 2022-12-22 DIAGNOSIS — J30.1 ALLERGY TO TREES: ICD-10-CM

## 2022-12-22 PROCEDURE — 95117 IMMUNOTHERAPY INJECTIONS: CPT | Performed by: NURSE PRACTITIONER

## 2022-12-22 NOTE — PROGRESS NOTES
After consent obtained/verified, allergy injection given in back of R/L arm(s). VIAL COLOR OF ALL VIALS TODAY IS red    ALLERGY INJECTION FROM VIAL A GIVEN left  UPPER ARM IN THE AMOUNT OF 0.40 ML    ALLERGY INJECTION FROM VIAL B GIVEN right upper ARM IN THE AMOUNT OF 0.40 ML    ALLERGY INJECTION FROM VIAL C GIVEN rightlower ARM IN THE AMOUNT OF 0.40 ML      Documentation of vial injection specific to arm(s) noted on Allergy Immunotherapy Administration Form. Patient waited 30 minutes for observation. No      Patient tolerated well without adverse reaction WHILE IN OFFICE    SHOT REACTION TREATMENT INSTRUCTIONS    During the 30 minute wait after an allergy injection the following symptoms should be reported:    Itching other than at the injection site  Hives or swelling other than at the injection site  Redness other than at the injection site  Difficulty breathing  Chest tightness  Difficulty swallowing  Throat tightness    If these symptoms occur, NOTIFY PROVIDER and the following treatment should be administered:    1. Epinephrine/Auvi Q 1:1000 IM - 0.3 ml if > 66 lbs or more, 0.15 ml if 33 - 63 lbs, or 0.1 ml if <33 lbs     2. Diphenhydramine - give all intramuscular:     2 to <6 years (off-label use): 6.25 mg,    6 to <12 years: 12.5 to 25 mg;    ?12 years: 25-50 mg.    3.  Famotidine:  Adults 40 mg oral    Adolescents age 12 years and >88 lbs: 40 mg    Children and Adolescents ? 12years of age: Initial: 0.25 mg/kg/dose  every 12 hours (maximum daily dose: 40 mg/day)    Epi/Auvi Q dose may me repeated in 5-15 minutes if adequate resolution of symptoms does not occur    Patient should be observed for at least one hour after final Epi/Auvi Q dose and must be seen by provider. Patients cannot drive themselves if they have received diphenhydramine.

## 2022-12-27 ENCOUNTER — NURSE ONLY (OUTPATIENT)
Dept: ALLERGY | Age: 47
End: 2022-12-27
Payer: COMMERCIAL

## 2022-12-27 VITALS
RESPIRATION RATE: 14 BRPM | TEMPERATURE: 97.5 F | DIASTOLIC BLOOD PRESSURE: 78 MMHG | SYSTOLIC BLOOD PRESSURE: 118 MMHG | HEART RATE: 94 BPM | OXYGEN SATURATION: 96 %

## 2022-12-27 DIAGNOSIS — Z91.048 ALLERGY TO MOLD: ICD-10-CM

## 2022-12-27 DIAGNOSIS — Z51.6 DESENSITIZATION TO ALLERGENS: Primary | ICD-10-CM

## 2022-12-27 DIAGNOSIS — J30.1 ALLERGY TO TREES: ICD-10-CM

## 2022-12-27 PROCEDURE — 95117 IMMUNOTHERAPY INJECTIONS: CPT | Performed by: NURSE PRACTITIONER

## 2022-12-29 ENCOUNTER — TELEPHONE (OUTPATIENT)
Dept: FAMILY MEDICINE CLINIC | Age: 47
End: 2022-12-29

## 2022-12-29 NOTE — TELEPHONE ENCOUNTER
Pt called stating she is still having sinus with sinus she started it on the 12 she is wanting another atb mucus is yellow pt is having sinus drainage in back of throat cough pt is wanting to know if she can get an Rx for sulfamethoxazole-trimethoprim (BACTRIM DS;SEPTRA DS) 800-160 MG per tablet    Send to [facility] [address]    Please call pt once addressed [phone]

## 2022-12-30 RX ORDER — SULFAMETHOXAZOLE AND TRIMETHOPRIM 800; 160 MG/1; MG/1
1 TABLET ORAL 2 TIMES DAILY
Qty: 20 TABLET | Refills: 0 | Status: SHIPPED | OUTPATIENT
Start: 2022-12-30 | End: 2023-01-09

## 2023-01-03 ENCOUNTER — HOSPITAL ENCOUNTER (OUTPATIENT)
Dept: WOMENS IMAGING | Age: 48
Discharge: HOME | End: 2023-01-03
Payer: COMMERCIAL

## 2023-01-03 DIAGNOSIS — Z12.31 SCREENING MAMMOGRAM FOR HIGH-RISK PATIENT: ICD-10-CM

## 2023-01-03 PROCEDURE — 77063 BREAST TOMOSYNTHESIS BI: CPT

## 2023-01-04 ENCOUNTER — TELEPHONE (OUTPATIENT)
Dept: FAMILY MEDICINE CLINIC | Age: 48
End: 2023-01-04

## 2023-01-16 ENCOUNTER — NURSE ONLY (OUTPATIENT)
Dept: ALLERGY | Age: 48
End: 2023-01-16
Payer: COMMERCIAL

## 2023-01-16 ENCOUNTER — OFFICE VISIT (OUTPATIENT)
Dept: ENT CLINIC | Age: 48
End: 2023-01-16
Payer: COMMERCIAL

## 2023-01-16 VITALS
OXYGEN SATURATION: 98 % | RESPIRATION RATE: 16 BRPM | HEART RATE: 90 BPM | DIASTOLIC BLOOD PRESSURE: 78 MMHG | TEMPERATURE: 97.9 F | SYSTOLIC BLOOD PRESSURE: 114 MMHG

## 2023-01-16 VITALS
RESPIRATION RATE: 16 BRPM | BODY MASS INDEX: 31.79 KG/M2 | OXYGEN SATURATION: 97 % | WEIGHT: 179.4 LBS | TEMPERATURE: 97.9 F | DIASTOLIC BLOOD PRESSURE: 78 MMHG | SYSTOLIC BLOOD PRESSURE: 114 MMHG | HEIGHT: 63 IN | HEART RATE: 90 BPM

## 2023-01-16 DIAGNOSIS — J32.9 CHRONIC RHINOSINUSITIS: Primary | ICD-10-CM

## 2023-01-16 DIAGNOSIS — J31.0 CHRONIC RHINOSINUSITIS: Primary | ICD-10-CM

## 2023-01-16 DIAGNOSIS — J35.01 CHRONIC TONSILLITIS: ICD-10-CM

## 2023-01-16 DIAGNOSIS — Z51.6 DESENSITIZATION TO ALLERGENS: Primary | ICD-10-CM

## 2023-01-16 DIAGNOSIS — Z91.048 ALLERGY TO MOLD: ICD-10-CM

## 2023-01-16 DIAGNOSIS — J30.1 ALLERGY TO TREES: ICD-10-CM

## 2023-01-16 PROCEDURE — 99214 OFFICE O/P EST MOD 30 MIN: CPT | Performed by: OTOLARYNGOLOGY

## 2023-01-16 PROCEDURE — 95117 IMMUNOTHERAPY INJECTIONS: CPT | Performed by: NURSE PRACTITIONER

## 2023-01-16 RX ORDER — SULFAMETHOXAZOLE AND TRIMETHOPRIM 800; 160 MG/1; MG/1
1 TABLET ORAL 2 TIMES DAILY
Qty: 20 TABLET | Refills: 0 | Status: SHIPPED | OUTPATIENT
Start: 2023-01-16 | End: 2023-01-30

## 2023-01-16 NOTE — PROGRESS NOTES
[facility] [place] [facility]  [facility] EAR, NOSE AND THROAT  [facility]  Dept: [phone]  Dept Fax: [phone]  Loc: [phone]    Lashae Starr is a 49 y.o. female who was referred by No ref. provider found for:  Chief Complaint   Patient presents with    Follow-up     Patient here for 3 month follow up. HPI:     Lashae Starr is a 49 y.o. female with a history of chronic rhinosinusitis status post multiple paranasal sinus operations including a nasal antral window of the right maxillary sinus. The patient comes today with at least 2 episodes of acute sinusitis with pressure between her eyes and mucopurulent discharge that is mostly per oral.  She states when she blows her nose she does not get the pus out. If she sneezes, the pus comes out of her throat. She has tenderness on either side of the nasal in the medial canthus area left worse than right. She still has her palatine tonsils. History:      Allergies   Allergen Reactions    [place] [Macadamia Nut Oil]      Almonds per allergy testing    Seasonal      Current Outpatient Medications   Medication Sig Dispense Refill    albuterol (PROVENTIL) (2.5 MG/3ML) 0.083% nebulizer solution Take 3 mLs by nebulization 4 times daily 120 mL 3    sertraline (ZOLOFT) 50 MG tablet Take 50 mg by mouth daily      meclizine (ANTIVERT) 25 MG tablet Take 1 tablet by mouth 4 times daily as needed for Dizziness 40 tablet 1    Fluticasone Propionate (XHANCE) 93 MCG/ACT EXHU 1 spray by Nasal route 2 times daily 6.3 mL 11    montelukast (SINGULAIR) 10 MG tablet Take 1 tablet by mouth nightly 90 tablet 3    dupilumab (DUPIXENT) 300 MG/2ML SOSY injection Inject 2 mLs into the skin every 14 days 2 each 5    ketotifen (ZADITOR) 0.025 % ophthalmic solution One drop in each eye twice daily as needed 1 each 2    ALLERGEN EXTRACT Inject as directed once a week      ZINC PO Take by mouth daily chlorpheniramine (CHLOR-TRIMETON) 2 MG/5ML syrup Take 2 mg by mouth every 4 hours as needed for Allergies       EPINEPHrine (AUVI-Q) 0.3 MG/0.3ML SOAJ injection Dispense 2 packs of 2 (total 4 devices). Use as directed, STAT for allergic reaction. 2 each 0    Multiple Vitamin (MULTI VITAMIN DAILY PO) Take by mouth daily       Ascorbic Acid (VITAMIN C PO) Take by mouth daily       VITAMIN D PO Take by mouth 2 times daily       Probiotic Product (PROBIOTIC PO) Take by mouth 1 tab in morning      b complex vitamins capsule Take 1 capsule by mouth daily       cetirizine (ZYRTEC) 10 MG tablet Take 10 mg by mouth nightly Not in last 10 days      Pseudoephedrine HCl (SUDAFED 12 HOUR PO) Take 1 tablet by mouth daily        No current facility-administered medications for this visit.      Past Medical History:   Diagnosis Date    Asthma     Chronic maxillary sinusitis 4/5/2021    COVID 01/09/2022    Panic attacks 4/98    PONV (postoperative nausea and vomiting)     Scopalamine patch    Seasonal allergies 4/99    Sinusitis, chronic       Past Surgical History:   Procedure Laterality Date    CYST REMOVAL      INTRAUTERINE DEVICE INSERTION      murena-  Out 2014    SINUS ENDOSCOPY  04/2021    dr Yuliana Real Bilateral 04/30/2021    SINUS ENDOSCOPY FUNCTIONAL SURGERY IMAGE GUIDED, RIGHT MAXILLARY ANTROSTOMY WITH REMOVAL OF TISSUE FROM MAXILLARY SINUS, RIGHT PARTIAL ANTERIOR ETHMOIDECTOMY, NASAL ENDOSCOPY OF LEFT ABBEY BULLOSA performed by Daily Flowers MD at [address] Bilateral 2/4/2022    IMAGE GUIDED ENDOSCOPY SURGERY BILATERAL ETHMOIDECTOMY(OSSEOUS RIGHT ETHMOID AIR CELLS OPACIFIED LEFT ETHMOID AIR CELL) RIGHT NASAL ANTRAL WINDOW performed by Daily Flowers MD at [address] Bilateral 5/5/2022    Image Guided Surgery for Right Frontal Sinus Exploration with Removal of Tissue, Right Nasal Antral Window Revision Left Ethmoidectomy Revision performed by [place] Brandon Hardy MD at [address]  02/04/2022    dr Kayleigh Henry  07/08/2014    Dr Krys Knight; Removal IUD     Family History   Problem Relation Age of Onset    Breast Cancer Mother 52    Cancer Mother     Breast Cancer Maternal Grandmother 48    Cancer Maternal Grandmother     High Blood Pressure Maternal Grandmother     Stroke Maternal Grandmother     Diabetes Maternal Grandfather     Heart Disease Neg Hx      Social History     Tobacco Use    Smoking status: Never    Smokeless tobacco: Never   Substance Use Topics    Alcohol use: Yes     Comment: occasinally        Subjective:      Review of Systems  Rest of review of systems are negative, except as noted in HPI. Objective:     /78 (Site: Left Upper Arm, Position: Sitting)   Pulse 90   Temp 97.9 °F (36.6 °C) (Infrared)   Resp 16   Ht 5' 3\" (1.6 m)   Wt 179 lb 6.4 oz (81.4 kg)   SpO2 97%   BMI 31.78 kg/m²     Physical Exam       On general physical exam the patient is pleasant alert cooperative well-appearing middle-aged female in no acute distress. Her voice and her speech pattern are within normal limits for her age and gender. I heard no throat clearing coughing or inspiratory stridor. On anterior inspection the patient's nasal airways were bilaterally clear of mucoid debris or purulence. No signs of recent bleeding were seen. No obstruction was seen. She has no facial or periorbital swelling. Her extraocular movements are grossly intact. The patient's oropharynx was abnormal for the presence of deeply endophytic palatine tonsils with the right side being more regular than the left. No signs of recent bleeding or obvious pus was seen. The patient's gag reflex was generous. Vitals reviewed. CT FACIAL BONES WO CONTRAST    Result Date: 12/19/2022  1.  Stable CT scan of the sinuses, no interval change since previous study dated 26th of April 2022. 2. Postoperative changes involving the ethmoid air cells and maxillary sinuses bilaterally and nasal cavity. 3. Mild mucosal thickening in ethmoid air cells bilaterally, left greater than right and minimal mucosal thickening in the left maxillary sinus. 4. The nasal septum is slightly deviated towards the left side. **This report has been created using voice recognition software. It may contain minor errors which are inherent in voice recognition technology. ** Final report electronically signed by DR Tj Santoyo on 12/19/2022 11:58 AM    TOYIN NITHIN DIGITAL SCREEN BILATERAL    Result Date: 1/3/2023  No mammographic evidence of malignancy. A 1 year screening mammogram is recommended. A result letter will be sent to the patient. She will also receive a reminder 1 month prior to her next mammogram. Based on the Tyrer-Cuzick assessment tool, the patient's calculated lifetime risk is at or above 20%. Per the American Cancer Society guidelines, she may be a candidate for screening breast MRI with and without contrast. BI-RADS CATEGORY 2: BENIGN FINDINGS. Management Recommendation: Routine annual mammography. **This report has been created using voice recognition software. It may contain minor errors which are inherent in voice recognition technology. ** Final report electronically signed by Dr. Pavithra Crawford on 1/3/2023 3:47 PM      Lab Results   Component Value Date/Time     03/05/2021 12:00 AM    K 4.2 03/05/2021 12:00 AM     03/05/2021 12:00 AM    CO2 22 03/05/2021 12:00 AM    BUN 8 03/05/2021 12:00 AM    CREATININE 0.74 03/05/2021 12:00 AM    CALCIUM 9.2 03/05/2021 12:00 AM    LABALBU 4.2 03/05/2021 12:00 AM    BILITOT 0.5 03/05/2021 12:00 AM    ALKPHOS 78 03/05/2021 12:00 AM    AST 22 03/05/2021 12:00 AM    ALT 23 03/05/2021 12:00 AM       All of the past medical history, past surgical history, family history,social history, allergies and current medications were reviewed with the patient.      Assessment & Plan   Diagnoses and all orders for this visit:     Diagnosis Orders   1. Chronic rhinosinusitis        2. Chronic tonsillitis      As possible cofactor to her purulent discharge          Based on patient's history and these physical findings as well as based on my review of the patient's recent CT scan, rhinosinusitis is certainly possible but her paranasal sinuses look healthier than they have anytime in the recent past.  It is possible that she is actually experiencing recurrent tonsillitis may be with some superimposed mild sinusitis process. I will give her a second course of the Bactrim DS regimen for 14 days and see what her symptom profile does. I recommended she looks at her throat to see if her tonsils themselves become inflamed with her symptom profile. It is possible that those soft tissue lymphoid daphney are the cause more than her paranasal sinuses. I will see her back in approximately 2 months to review her symptom profile and see how she is done in the meantime. I spent approximately 30 minutes of face-to-face time with the patient and the majority of which was dedicated to reviewing her complex history and planning this treatment program.      Return in about 2 months (around 3/16/2023) for Follow-up evaluation and to plan further care as indicated. **This report has been created using voice recognition software. It may contain minor errors which are inherent in voice recognition technology. **

## 2023-01-16 NOTE — PROGRESS NOTES
After consent obtained/verified, allergy injection given in back of R/L arm(s). VIAL COLOR OF ALL VIALS TODAY IS red    ALLERGY INJECTION FROM VIAL A GIVEN left  UPPER ARM IN THE AMOUNT OF 0.45 ML    ALLERGY INJECTION FROM VIAL B GIVEN right lower ARM IN THE AMOUNT OF 0.45 ML    ALLERGY INJECTION FROM VIAL C GIVEN rightupper ARM IN THE AMOUNT OF 0.45 ML      Documentation of vial injection specific to arm(s) noted on Allergy Immunotherapy Administration Form. Patient waited 30 minutes for observation. yes      Patient tolerated well without adverse reaction WHILE IN OFFICE    SHOT REACTION TREATMENT INSTRUCTIONS    During the 30 minute wait after an allergy injection the following symptoms should be reported:    Itching other than at the injection site  Hives or swelling other than at the injection site  Redness other than at the injection site  Difficulty breathing  Chest tightness  Difficulty swallowing  Throat tightness    If these symptoms occur, NOTIFY PROVIDER and the following treatment should be administered:    1. Epinephrine/Auvi Q 1:1000 IM - 0.3 ml if > 66 lbs or more, 0.15 ml if 33 - 63 lbs, or 0.1 ml if <33 lbs     2. Diphenhydramine - give all intramuscular:     2 to <6 years (off-label use): 6.25 mg,    6 to <12 years: 12.5 to 25 mg;    ?12 years: 25-50 mg.    3.  Famotidine:  Adults 40 mg oral    Adolescents age 12 years and >88 lbs: 40 mg    Children and Adolescents ? 12years of age: Initial: 0.25 mg/kg/dose  every 12 hours (maximum daily dose: 40 mg/day)    Epi/Auvi Q dose may me repeated in 5-15 minutes if adequate resolution of symptoms does not occur    Patient should be observed for at least one hour after final Epi/Auvi Q dose and must be seen by provider. Patients cannot drive themselves if they have received diphenhydramine.

## 2023-01-26 ENCOUNTER — NURSE ONLY (OUTPATIENT)
Dept: ALLERGY | Age: 48
End: 2023-01-26
Payer: COMMERCIAL

## 2023-01-26 VITALS
SYSTOLIC BLOOD PRESSURE: 118 MMHG | TEMPERATURE: 97.4 F | RESPIRATION RATE: 18 BRPM | OXYGEN SATURATION: 97 % | DIASTOLIC BLOOD PRESSURE: 76 MMHG | HEART RATE: 101 BPM

## 2023-01-26 DIAGNOSIS — Z91.048 ALLERGY TO MOLD: ICD-10-CM

## 2023-01-26 DIAGNOSIS — Z51.6 DESENSITIZATION TO ALLERGENS: Primary | ICD-10-CM

## 2023-01-26 DIAGNOSIS — J30.1 ALLERGY TO TREES: ICD-10-CM

## 2023-01-26 PROCEDURE — 95117 IMMUNOTHERAPY INJECTIONS: CPT | Performed by: NURSE PRACTITIONER

## 2023-01-26 NOTE — PROGRESS NOTES
After consent obtained/verified, allergy injection given in back of R/L arm(s). VIAL COLOR OF ALL VIALS TODAY IS red    ALLERGY INJECTION FROM VIAL A GIVEN right  UPPER ARM IN THE AMOUNT OF 0.50 ML    ALLERGY INJECTION FROM VIAL B GIVEN left upper ARM IN THE AMOUNT OF 0.50 ML    ALLERGY INJECTION FROM VIAL C GIVEN leftlower ARM IN THE AMOUNT OF 0.50 ML      Documentation of vial injection specific to arm(s) noted on Allergy Immunotherapy Administration Form. Patient waited 30 minutes for observation. No      Patient tolerated well without adverse reaction WHILE IN OFFICE    SHOT REACTION TREATMENT INSTRUCTIONS    During the 30 minute wait after an allergy injection the following symptoms should be reported:    Itching other than at the injection site  Hives or swelling other than at the injection site  Redness other than at the injection site  Difficulty breathing  Chest tightness  Difficulty swallowing  Throat tightness    If these symptoms occur, NOTIFY PROVIDER and the following treatment should be administered:    1. Epinephrine/Auvi Q 1:1000 IM - 0.3 ml if > 66 lbs or more, 0.15 ml if 33 - 63 lbs, or 0.1 ml if <33 lbs     2. Diphenhydramine - give all intramuscular:     2 to <6 years (off-label use): 6.25 mg,    6 to <12 years: 12.5 to 25 mg;    ?12 years: 25-50 mg.    3.  Famotidine:  Adults 40 mg oral    Adolescents age 12 years and >88 lbs: 40 mg    Children and Adolescents ? 12years of age: Initial: 0.25 mg/kg/dose  every 12 hours (maximum daily dose: 40 mg/day)    Epi/Auvi Q dose may me repeated in 5-15 minutes if adequate resolution of symptoms does not occur    Patient should be observed for at least one hour after final Epi/Auvi Q dose and must be seen by provider. Patients cannot drive themselves if they have received diphenhydramine.

## 2023-02-01 ENCOUNTER — NURSE ONLY (OUTPATIENT)
Dept: ALLERGY | Age: 48
End: 2023-02-01
Payer: COMMERCIAL

## 2023-02-01 VITALS
OXYGEN SATURATION: 98 % | HEART RATE: 78 BPM | DIASTOLIC BLOOD PRESSURE: 78 MMHG | TEMPERATURE: 97.8 F | RESPIRATION RATE: 18 BRPM | SYSTOLIC BLOOD PRESSURE: 116 MMHG

## 2023-02-01 DIAGNOSIS — J33.9 NASAL POLYPS: ICD-10-CM

## 2023-02-01 DIAGNOSIS — Z51.6 DESENSITIZATION TO ALLERGENS: Primary | ICD-10-CM

## 2023-02-01 DIAGNOSIS — Z91.048 ALLERGY TO MOLD: ICD-10-CM

## 2023-02-01 DIAGNOSIS — J30.1 ALLERGY TO TREES: ICD-10-CM

## 2023-02-01 PROCEDURE — 95117 IMMUNOTHERAPY INJECTIONS: CPT | Performed by: NURSE PRACTITIONER

## 2023-02-01 RX ORDER — EPINEPHRINE 0.3 MG/.3ML
INJECTION SUBCUTANEOUS
Qty: 1 EACH | Refills: 1 | Status: SHIPPED | OUTPATIENT
Start: 2023-02-01 | End: 2023-02-02 | Stop reason: SDUPTHER

## 2023-02-02 ENCOUNTER — TELEPHONE (OUTPATIENT)
Dept: ALLERGY | Age: 48
End: 2023-02-02

## 2023-02-02 DIAGNOSIS — Z51.6 DESENSITIZATION TO ALLERGENS: Primary | ICD-10-CM

## 2023-02-02 DIAGNOSIS — J33.9 NASAL POLYPS: ICD-10-CM

## 2023-02-02 RX ORDER — EPINEPHRINE 0.3 MG/.3ML
INJECTION SUBCUTANEOUS
Qty: 1 EACH | Refills: 1 | Status: SHIPPED | OUTPATIENT
Start: 2023-02-02 | End: 2023-02-02

## 2023-02-02 RX ORDER — EPINEPHRINE 0.3 MG/.3ML
INJECTION SUBCUTANEOUS
Qty: 1 EACH | Refills: 1 | Status: SHIPPED | OUTPATIENT
Start: 2023-02-02

## 2023-02-02 NOTE — TELEPHONE ENCOUNTER
Pt called in stating [facility] called and her auvi-q is not covered by her insurance and going to cost her $150. Pt is requesting to have provider send in epi pen to her local pharmacy. Pt wants prescription sent to [facility] [address] [facility] [address], [place].

## 2023-02-09 ENCOUNTER — NURSE ONLY (OUTPATIENT)
Dept: ALLERGY | Age: 48
End: 2023-02-09
Payer: COMMERCIAL

## 2023-02-09 VITALS
DIASTOLIC BLOOD PRESSURE: 76 MMHG | HEART RATE: 76 BPM | OXYGEN SATURATION: 97 % | TEMPERATURE: 98.1 F | SYSTOLIC BLOOD PRESSURE: 118 MMHG | RESPIRATION RATE: 18 BRPM

## 2023-02-09 DIAGNOSIS — J30.1 ALLERGY TO TREES: ICD-10-CM

## 2023-02-09 DIAGNOSIS — Z91.048 ALLERGY TO MOLD: ICD-10-CM

## 2023-02-09 DIAGNOSIS — Z51.6 DESENSITIZATION TO ALLERGENS: Primary | ICD-10-CM

## 2023-02-09 PROCEDURE — 95117 IMMUNOTHERAPY INJECTIONS: CPT | Performed by: NURSE PRACTITIONER

## 2023-02-14 ENCOUNTER — TELEPHONE (OUTPATIENT)
Dept: ENT CLINIC | Age: 48
End: 2023-02-14

## 2023-02-14 NOTE — TELEPHONE ENCOUNTER
Pt called stating that she still has yellow-green drainage, even after the course of antibiotics. She has deep congestion and cant get it when she blows but comes out when she rinses. She said the antibiotics didn't clear it all last time, that even at the end of the course she still had a little bit of colored drainage. She is asking for another round of antibiotics.      Please advise

## 2023-02-14 NOTE — TELEPHONE ENCOUNTER
Dr Evert Salcedo - any recommendations?   She has been on the following recently:    12/12/22 10 day course Augmentin  12/30/22  10 day course Bactrim DS  1/16/23  10 day course Bactrim DS

## 2023-02-15 NOTE — TELEPHONE ENCOUNTER
I spoke with Dr. Frieda Ibarra and he wrote some recommendations down. I will talk to [facility] and see if he can send over the orders. Dr. Frieda Ibarra recommends a new culture, New CT.

## 2023-02-15 NOTE — TELEPHONE ENCOUNTER
I called and left a VM for Lashae. Carloz Selby has agreed to see her on 02/16/23 @ 9:15 am. If patient calls back please offer her this appt.

## 2023-02-15 NOTE — TELEPHONE ENCOUNTER
Pt is not available to come in 2/16/23 @738. She asked if anything could be done with out having to come into office due to work schedule.

## 2023-02-15 NOTE — TELEPHONE ENCOUNTER
Called pt and she is not able to come in on 2/17/23 either. Per Carloz Selby I instructed her to give it a few more days since finishing the antibiotics and to continue to irrigate 2x's a day.  She will check in on Monday

## 2023-02-21 ENCOUNTER — NURSE ONLY (OUTPATIENT)
Dept: ALLERGY | Age: 48
End: 2023-02-21
Payer: COMMERCIAL

## 2023-02-21 VITALS
DIASTOLIC BLOOD PRESSURE: 78 MMHG | OXYGEN SATURATION: 96 % | HEART RATE: 85 BPM | SYSTOLIC BLOOD PRESSURE: 120 MMHG | TEMPERATURE: 97.1 F

## 2023-02-21 DIAGNOSIS — J30.1 ALLERGY TO TREES: ICD-10-CM

## 2023-02-21 DIAGNOSIS — Z51.6 DESENSITIZATION TO ALLERGENS: Primary | ICD-10-CM

## 2023-02-21 DIAGNOSIS — Z91.048 ALLERGY TO MOLD: ICD-10-CM

## 2023-02-21 PROCEDURE — 95117 IMMUNOTHERAPY INJECTIONS: CPT | Performed by: NURSE PRACTITIONER

## 2023-03-09 ENCOUNTER — NURSE ONLY (OUTPATIENT)
Dept: ALLERGY | Age: 48
End: 2023-03-09
Payer: COMMERCIAL

## 2023-03-09 VITALS
OXYGEN SATURATION: 96 % | SYSTOLIC BLOOD PRESSURE: 114 MMHG | TEMPERATURE: 97 F | DIASTOLIC BLOOD PRESSURE: 70 MMHG | HEART RATE: 65 BPM

## 2023-03-09 DIAGNOSIS — Z51.6 DESENSITIZATION TO ALLERGENS: Primary | ICD-10-CM

## 2023-03-09 DIAGNOSIS — Z91.048 ALLERGY TO MOLD: ICD-10-CM

## 2023-03-09 DIAGNOSIS — J30.1 ALLERGY TO TREES: ICD-10-CM

## 2023-03-09 PROCEDURE — 95117 IMMUNOTHERAPY INJECTIONS: CPT | Performed by: NURSE PRACTITIONER

## 2023-03-15 ENCOUNTER — NURSE ONLY (OUTPATIENT)
Dept: ALLERGY | Age: 48
End: 2023-03-15
Payer: COMMERCIAL

## 2023-03-15 ENCOUNTER — OFFICE VISIT (OUTPATIENT)
Dept: ENT CLINIC | Age: 48
End: 2023-03-15
Payer: COMMERCIAL

## 2023-03-15 VITALS
HEIGHT: 63 IN | RESPIRATION RATE: 12 BRPM | BODY MASS INDEX: 32.21 KG/M2 | HEART RATE: 79 BPM | WEIGHT: 181.8 LBS | TEMPERATURE: 98.4 F | DIASTOLIC BLOOD PRESSURE: 84 MMHG | OXYGEN SATURATION: 96 % | SYSTOLIC BLOOD PRESSURE: 124 MMHG

## 2023-03-15 VITALS
DIASTOLIC BLOOD PRESSURE: 84 MMHG | TEMPERATURE: 98.4 F | HEART RATE: 79 BPM | SYSTOLIC BLOOD PRESSURE: 124 MMHG | OXYGEN SATURATION: 96 % | RESPIRATION RATE: 12 BRPM

## 2023-03-15 DIAGNOSIS — J30.2 SEASONAL ALLERGIES: ICD-10-CM

## 2023-03-15 DIAGNOSIS — Z51.6 DESENSITIZATION TO ALLERGENS: Primary | ICD-10-CM

## 2023-03-15 DIAGNOSIS — R09.81 NASAL CONGESTION: ICD-10-CM

## 2023-03-15 DIAGNOSIS — J30.1 ALLERGY TO TREES: ICD-10-CM

## 2023-03-15 DIAGNOSIS — Z91.048 ALLERGY TO MOLD: ICD-10-CM

## 2023-03-15 DIAGNOSIS — J32.9 CHRONIC RHINOSINUSITIS: Primary | ICD-10-CM

## 2023-03-15 DIAGNOSIS — R09.82 POST-NASAL DRAINAGE: ICD-10-CM

## 2023-03-15 DIAGNOSIS — J34.89 INTRANASAL SYNECHIAE: ICD-10-CM

## 2023-03-15 DIAGNOSIS — J31.0 CHRONIC RHINOSINUSITIS: Primary | ICD-10-CM

## 2023-03-15 PROCEDURE — 99214 OFFICE O/P EST MOD 30 MIN: CPT | Performed by: PHYSICIAN ASSISTANT

## 2023-03-15 PROCEDURE — 95117 IMMUNOTHERAPY INJECTIONS: CPT | Performed by: NURSE PRACTITIONER

## 2023-03-15 NOTE — PROGRESS NOTES
[facility] [place] [facility]  [facility] NOSE AND TH[facility]  [facility]  Dept: [phone]  Dept Fax: [phone]  Loc: [phone]    Lashae Burch is a 49 y.o. female who was referred by No ref. provider found for:  Chief Complaint   Patient presents with    Other     Patient here for culture. Lauren Grimm HPI:     Current visit documentation 03/15/23:    Angella Donohue [place] presents today for evaluation of nasal/sinus symptoms. She reports that she still feels congested. She states that she felt better initially after surgeries, but the last 6 months she feels like she has gradually worsened. She describes her congestion as a deep congestion in the back of the nose. She is intermittently able to dislodge yellow mucus, especially after saline irrigation. She states that most of the time her nose feels blocked without being able to dislodge mucous. She has been using the saline irrigations at least twice a day, but sometimes 3-4 times on especially symptomatic days. She reports that when she was on Bactrim most recently it seemed to start to help her symptoms, but then gradually recurred after completing the antibiotics. She was treated with 10 days of Augmentin on 12/12/2022, 10 days of Bactrim on 10/30/2022, and 10 additional days of Bactrim on 1/16/2023. Prior to her sinus surgery she had a really bad odor in her nose that seem to initially resolve. However, over the last 6 months she has noticed occasional whiffs of a similar odor, but less pungent and frequent compared to before surgery. She has been taking Sudafed and Mucinex D intermittently without benefit. Previous visit documentation 1/16/23: Lashae Burch is a 49 y.o. female with a history of chronic rhinosinusitis status post multiple paranasal sinus operations including a nasal antral window of the right maxillary sinus.   The patient comes today with at least 2 episodes of acute sinusitis with pressure between her eyes and mucopurulent discharge that is mostly per oral.  She states when she blows her nose she does not get the pus out. If she sneezes, the pus comes out of her throat. She has tenderness on either side of the nasal in the medial canthus area left worse than right. She still has her palatine tonsils. Previous visit documentation 8/24/22: Lashae Eller is a 49 y.o. female with a history of chronic rhinosinusitis and nasal polyposis status post multiple endonasal operations to bring this process under control. The patient returns today with a concern that she is developing some mucopurulence and occasional foul odor that she perceives particularly in the right nasal passage. Subjective:      REVIEW OF SYSTEMS:    12 point review of systems completed. Pertinent positive noted, otherwise ROS is negative.     ALLERGIES:  Mancil Goldberg nut oil] and Seasonal    Past Medical History:  Past Medical History:   Diagnosis Date    Asthma     Chronic maxillary sinusitis 4/5/2021    COVID 01/09/2022    Panic attacks 4/98    PONV (postoperative nausea and vomiting)     Scopalamine patch    Seasonal allergies 4/99    Sinusitis, chronic        PSM:  Past Surgical History:   Procedure Laterality Date    CYST REMOVAL      INTRAUTERINE DEVICE INSERTION      murena-  Out 2014    SINUS ENDOSCOPY  04/2021    dr Magnus Kim Bilateral 04/30/2021    SINUS ENDOSCOPY FUNCTIONAL SURGERY IMAGE GUIDED, RIGHT MAXILLARY ANTROSTOMY WITH REMOVAL OF TISSUE FROM MAXILLARY SINUS, RIGHT PARTIAL ANTERIOR ETHMOIDECTOMY, NASAL ENDOSCOPY OF LEFT ABBEY BULLOSA performed by Ananya Martin MD at [address] Bilateral 2/4/2022    IMAGE GUIDED ENDOSCOPY SURGERY BILATERAL ETHMOIDECTOMY(OSSEOUS RIGHT ETHMOID AIR CELLS OPACIFIED LEFT ETHMOID AIR CELL) RIGHT NASAL ANTRAL WINDOW performed by Ananya Martin MD at [address] Bilateral 5/5/2022 Image Guided Surgery for Right Frontal Sinus Exploration with Removal of Tissue, Right Nasal Antral Window Revision Left Ethmoidectomy Revision performed by Merna Marin MD at [address]  02/04/2022    dr Mayela Alcantar  07/08/2014    Dr Tse Plan; Removal IUD       Family History:       Problem Relation Age of Onset    Breast Cancer Mother 52    Cancer Mother     Breast Cancer Maternal Grandmother 48    Cancer Maternal Grandmother     High Blood Pressure Maternal Grandmother     Stroke Maternal Grandmother     Diabetes Maternal Grandfather     Heart Disease Neg Hx        Surgical History:  Past Surgical History:   Procedure Laterality Date    CYST REMOVAL      INTRAUTERINE DEVICE INSERTION      murena-  Out 2014    SINUS ENDOSCOPY  04/2021    dr Elyssa Pearce Bilateral 04/30/2021    SINUS ENDOSCOPY FUNCTIONAL SURGERY IMAGE GUIDED, RIGHT MAXILLARY ANTROSTOMY WITH REMOVAL OF TISSUE FROM MAXILLARY SINUS, RIGHT PARTIAL ANTERIOR ETHMOIDECTOMY, NASAL ENDOSCOPY OF LEFT ABBEY BULLOSA performed by Merna Marin MD at [address] Bilateral 2/4/2022    IMAGE GUIDED ENDOSCOPY SURGERY BILATERAL ETHMOIDECTOMY(OSSEOUS RIGHT ETHMOID AIR CELLS OPACIFIED LEFT ETHMOID AIR CELL) RIGHT NASAL ANTRAL WINDOW performed by Merna Marin MD at [address] Bilateral 5/5/2022    Image Guided Surgery for Right Frontal Sinus Exploration with Removal of Tissue, Right Nasal Antral Window Revision Left Ethmoidectomy Revision performed by Merna Marin MD at [address]  02/04/2022    dr Mayela Alcantar  07/08/2014    Dr Tse Plan; Removal IUD        MEDICATIONS:  Current Outpatient Medications   Medication Sig Dispense Refill    EPINEPHrine (EPIPEN 2-KAYE) 0.3 MG/0.3ML SOAJ injection Inject one pen as directed STAT for allergic reaction, may disp generic NDC 03435-055-00 1 each 1    albuterol (PROVENTIL) (2.5 MG/3ML) 0.083% nebulizer solution Take 3 mLs by nebulization 4 times daily 120 mL 3    sertraline (ZOLOFT) 50 MG tablet Take 50 mg by mouth daily      meclizine (ANTIVERT) 25 MG tablet Take 1 tablet by mouth 4 times daily as needed for Dizziness 40 tablet 1    Fluticasone Propionate (XHANCE) 93 MCG/ACT EXHU 1 spray by Nasal route 2 times daily 6.3 mL 11    montelukast (SINGULAIR) 10 MG tablet Take 1 tablet by mouth nightly 90 tablet 3    dupilumab (DUPIXENT) 300 MG/2ML SOSY injection Inject 2 mLs into the skin every 14 days 2 each 5    ketotifen (ZADITOR) 0.025 % ophthalmic solution One drop in each eye twice daily as needed 1 each 2    ALLERGEN EXTRACT Inject as directed once a week      ZINC PO Take by mouth daily       chlorpheniramine (CHLOR-TRIMETON) 2 MG/5ML syrup Take 2 mg by mouth every 4 hours as needed for Allergies       Multiple Vitamin (MULTI VITAMIN DAILY PO) Take by mouth daily       Ascorbic Acid (VITAMIN C PO) Take by mouth daily       VITAMIN D PO Take by mouth 2 times daily       Probiotic Product (PROBIOTIC PO) Take by mouth 1 tab in morning      b complex vitamins capsule Take 1 capsule by mouth daily       cetirizine (ZYRTEC) 10 MG tablet Take 10 mg by mouth nightly Not in last 10 days      Pseudoephedrine HCl (SUDAFED 12 HOUR PO) Take 1 tablet by mouth daily        No current facility-administered medications for this visit. Objective:   /84 (Site: Right Upper Arm, Position: Sitting)   Pulse 79   Temp 98.4 °F (36.9 °C) (Infrared)   Resp 12   Ht 5' 3\" (1.6 m)   Wt 181 lb 12.8 oz (82.5 kg)   SpO2 96%   BMI 32.20 kg/m²     PHYSICAL EXAM  Constitutional: Oriented and cooperative. Appears well-developed and well-nourished. No distress. No hyponasal speech noted. HENT:   Head: Normocephalic and atraumatic. Nose:  External nose normal. Nasal mucosa pink, clear, no lesions/masses noted. Septum relatively midline, unable to visualize perforation.   Reduced turbinates with visible synechia between reduced inferior turbinate and septum on the left. Mostly clear, with some cloudy yellow nasal discharge. Culture obtained of cloudy drainage from left nare  Mouth/Throat:   Oral cavity mucosa normal, no masses or lesions noted. Oropharynx is clear and moist. Tonsils present without erythema or exudates. Hard and soft palate  symmetrical and intact  Cardiovascular:  Normal rate. Pulmonary/Chest:  Effort normal. No stridor or stertor. No respiratory distress. Musculoskeletal:  Normal range of motion. No edema or lymphadenopathy. Neurological:  Alert and answers questions appropriately, cooperative with exam.   Cranial nerve II-XII grossly intact. Skin:  Skin is warm. No erythema. Psychiatric:  Normal mood and affect. Behavior is normal.     Data:    All of the past medical history, past surgical history, family history, social history, allergies and current medications were reviewed. This includes notes from referring provider(s) and associated labs/imaging. CT facial bones wo contrast 12/19/22:  FINDINGS:           Frontal sinuses: Normally aerated and pneumatized. Ethmoid air cells: Postoperative changes involving the ethmoid air cells bilaterally. Mild mucosal thickening in the ethmoid air cells, left greater than right. . The lamina papyracea are intact. The cribriform plates and fovea ethmoidalis are relatively    symmetric. Sphenoid sinus: Normally aerated and pneumatized. Maxillary sinuses: Postoperative changes along the medial walls of both maxillary sinus. Minimal mucosal thickening in the left maxillary sinus. The right maxillary sinus is clear. .       Nasal cavity: The nasal septum is slightly deviated towards the left side. Postoperative changes in the nasal cavity No polyps or masses are noted. The mastoid air cells and middle ear cavities are normally aerated.        There are no suspicious findings in the imaged aspects of the brain parenchyma and facial soft tissues. Impression       1. Stable CT scan of the sinuses, no interval change since previous study dated 26th of April 2022.   2. Postoperative changes involving the ethmoid air cells and maxillary sinuses bilaterally and nasal cavity. 3. Mild mucosal thickening in ethmoid air cells bilaterally, left greater than right and minimal mucosal thickening in the left maxillary sinus. 4. The nasal septum is slightly deviated towards the left side. Assessment/Plan:      ICD-10-CM    1. Chronic rhinosinusitis  J31.0 Culture, Nasal    J32.9       2. Nasal congestion  R09.81       3. 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 Post-nasal drainage  R09.82            Culture obtained today. Will await results for culture directed antibiotics. I believe some of the patient's obstructive/congested symptoms are related to possible synechia. At least 1 area of synechia is visible in the left nare with anterior rhinoscopy  Discussed repeat CT scan with the patient. Opted for follow-up with Dr. Karissa Reyes in April after antibiotics as scheduled for likely nasal endoscopy. She will discuss if a repeat CT is necessary at that time based on her symptoms and findings during exam, patient voices concerns regarding frequency of imaging. Continue nasal saline irrigations, Xhance, and allergy regimen as prescribed  Follow-up with Dr. Karissa Reyes as scheduled.   Contact office sooner as needed    (Please note that portions of this note may have been completed with a voice recognition program.  Efforts were made to edit the dictation but occasionally words are mis-transcribed.)    Electronically signed by JOSÉ MIGUEL Main on 3/15/2023 at 11:36 AM

## 2023-03-17 ENCOUNTER — TELEPHONE (OUTPATIENT)
Dept: ENT CLINIC | Age: 48
End: 2023-03-17

## 2023-03-17 LAB — BACTERIA THROAT AEROBE CULT: NORMAL

## 2023-03-17 RX ORDER — SULFAMETHOXAZOLE AND TRIMETHOPRIM 800; 160 MG/1; MG/1
1 TABLET ORAL 2 TIMES DAILY
Qty: 20 TABLET | Refills: 0 | Status: SHIPPED | OUTPATIENT
Start: 2023-03-17 | End: 2023-03-27

## 2023-03-17 NOTE — TELEPHONE ENCOUNTER
I contacted patient to discuss culture results. There is no growth showed normal arvind. I did obtain a culture from some of the yellow secretions, but was not some of the thicker secretions that she has experienced intermittently. Since the Bactrim has helped her symptoms previously, we will try a 10-day course to see how she feels. I strongly encouraged to increase fluid intake, which she agreed to. Keep follow-up as scheduled with Dr. Colton Orellana for time being.

## 2023-03-29 ENCOUNTER — OFFICE VISIT (OUTPATIENT)
Dept: ALLERGY | Age: 48
End: 2023-03-29
Payer: COMMERCIAL

## 2023-03-29 ENCOUNTER — NURSE ONLY (OUTPATIENT)
Dept: ALLERGY | Age: 48
End: 2023-03-29
Payer: COMMERCIAL

## 2023-03-29 VITALS
BODY MASS INDEX: 32.12 KG/M2 | DIASTOLIC BLOOD PRESSURE: 74 MMHG | WEIGHT: 181.3 LBS | SYSTOLIC BLOOD PRESSURE: 128 MMHG | TEMPERATURE: 97.3 F | HEART RATE: 84 BPM | OXYGEN SATURATION: 98 % | HEIGHT: 63 IN | RESPIRATION RATE: 16 BRPM

## 2023-03-29 VITALS
RESPIRATION RATE: 16 BRPM | OXYGEN SATURATION: 98 % | TEMPERATURE: 97.3 F | DIASTOLIC BLOOD PRESSURE: 74 MMHG | SYSTOLIC BLOOD PRESSURE: 128 MMHG

## 2023-03-29 DIAGNOSIS — J30.81 ALLERGY TO DOG DANDER: ICD-10-CM

## 2023-03-29 DIAGNOSIS — J30.1 ALLERGY TO TREES: ICD-10-CM

## 2023-03-29 DIAGNOSIS — J33.9 NASAL POLYPS: ICD-10-CM

## 2023-03-29 DIAGNOSIS — Z91.09 ALLERGY TO AMERICAN HOUSE DUST MITE: ICD-10-CM

## 2023-03-29 DIAGNOSIS — J30.81 ALLERGY TO CATS: ICD-10-CM

## 2023-03-29 DIAGNOSIS — Z51.6 DESENSITIZATION TO ALLERGENS: ICD-10-CM

## 2023-03-29 DIAGNOSIS — J33.9 NASAL POLYPOSIS: ICD-10-CM

## 2023-03-29 DIAGNOSIS — Z51.6 DESENSITIZATION TO ALLERGENS: Primary | ICD-10-CM

## 2023-03-29 DIAGNOSIS — J30.81 ANIMAL DANDER ALLERGY: ICD-10-CM

## 2023-03-29 DIAGNOSIS — J30.1 ACUTE SEASONAL ALLERGIC RHINITIS DUE TO POLLEN: ICD-10-CM

## 2023-03-29 DIAGNOSIS — Z91.038 ALLERGY TO COCKROACHES: ICD-10-CM

## 2023-03-29 DIAGNOSIS — J30.9 ALLERGIC RHINOCONJUNCTIVITIS: Primary | ICD-10-CM

## 2023-03-29 DIAGNOSIS — Z91.048 ALLERGY TO MOLD: ICD-10-CM

## 2023-03-29 DIAGNOSIS — H10.10 ALLERGIC RHINOCONJUNCTIVITIS: Primary | ICD-10-CM

## 2023-03-29 DIAGNOSIS — Z51.6 ENCOUNTER FOR DESENSITIZATION TO ALLERGENS: ICD-10-CM

## 2023-03-29 DIAGNOSIS — J30.81 CAT ALLERGIES: ICD-10-CM

## 2023-03-29 PROCEDURE — 99213 OFFICE O/P EST LOW 20 MIN: CPT | Performed by: NURSE PRACTITIONER

## 2023-03-29 PROCEDURE — 95117 IMMUNOTHERAPY INJECTIONS: CPT | Performed by: NURSE PRACTITIONER

## 2023-03-29 ASSESSMENT — ENCOUNTER SYMPTOMS
VOICE CHANGE: 0
FACIAL SWELLING: 0
CHEST TIGHTNESS: 0
WHEEZING: 0
SORE THROAT: 0
SHORTNESS OF BREATH: 0
TROUBLE SWALLOWING: 0
COUGH: 0
ABDOMINAL PAIN: 0
VOMITING: 0
NAUSEA: 0
APNEA: 0
COLOR CHANGE: 0
DIARRHEA: 0
SINUS PRESSURE: 0
CHOKING: 0
RHINORRHEA: 0
STRIDOR: 0

## 2023-03-29 NOTE — PROGRESS NOTES
procedures.     (Please note that portions of this note may have been completed with a voice recognition program.  Efforts were made to edit the dictation but occasionally words are mis-transcribed.)         Signed:  RICH Danielle CNP  3/29/2023  11:40 AM

## 2023-03-29 NOTE — PROGRESS NOTES
After consent obtained/verified, allergy injection given in back of R/L arm(s). VIAL COLOR OF ALL VIALS TODAY IS RED    ALLERGY INJECTION FROM VIAL A GIVEN right  UPPER ARM IN THE AMOUNT OF 0.50 ML    ALLERGY INJECTION FROM VIAL B GIVEN left upper ARM IN THE AMOUNT OF 0.50 ML    ALLERGY INJECTION FROM VIAL C GIVEN leftlower ARM IN THE AMOUNT OF 0.50 ML      Documentation of vial injection specific to arm(s) noted on Allergy Immunotherapy Administration Form. Patient waited 30 minutes for observation. Yes      Patient tolerated well without adverse reaction WHILE IN OFFICE    SHOT REACTION TREATMENT INSTRUCTIONS    During the 30 minute wait after an allergy injection the following symptoms should be reported:    Itching other than at the injection site  Hives or swelling other than at the injection site  Redness other than at the injection site  Difficulty breathing  Chest tightness  Difficulty swallowing  Throat tightness    If these symptoms occur, NOTIFY PROVIDER and the following treatment should be administered:    1. Epinephrine/Auvi Q 1:1000 IM - 0.3 ml if > 66 lbs or more, 0.15 ml if 33 - 63 lbs, or 0.1 ml if <33 lbs     2. Diphenhydramine - give all intramuscular:     2 to <6 years (off-label use): 6.25 mg,    6 to <12 years: 12.5 to 25 mg;    ?12 years: 25-50 mg.    3.  Famotidine:  Adults 40 mg oral    Adolescents age 12 years and >88 lbs: 40 mg    Children and Adolescents ? 12years of age: Initial: 0.25 mg/kg/dose  every 12 hours (maximum daily dose: 40 mg/day)    Epi/Auvi Q dose may me repeated in 5-15 minutes if adequate resolution of symptoms does not occur    Patient should be observed for at least one hour after final Epi/Auvi Q dose and must be seen by provider. Patients cannot drive themselves if they have received diphenhydramine.

## 2023-04-05 ENCOUNTER — NURSE ONLY (OUTPATIENT)
Dept: ALLERGY | Age: 48
End: 2023-04-05
Payer: COMMERCIAL

## 2023-04-05 VITALS
SYSTOLIC BLOOD PRESSURE: 122 MMHG | RESPIRATION RATE: 16 BRPM | DIASTOLIC BLOOD PRESSURE: 74 MMHG | OXYGEN SATURATION: 98 % | HEART RATE: 86 BPM

## 2023-04-05 DIAGNOSIS — J30.1 ALLERGY TO TREES: ICD-10-CM

## 2023-04-05 DIAGNOSIS — Z91.048 ALLERGY TO MOLD: ICD-10-CM

## 2023-04-05 DIAGNOSIS — Z51.6 DESENSITIZATION TO ALLERGENS: Primary | ICD-10-CM

## 2023-04-05 PROCEDURE — 95117 IMMUNOTHERAPY INJECTIONS: CPT | Performed by: NURSE PRACTITIONER

## 2023-04-17 ENCOUNTER — NURSE ONLY (OUTPATIENT)
Dept: ALLERGY | Age: 48
End: 2023-04-17
Payer: COMMERCIAL

## 2023-04-17 VITALS
SYSTOLIC BLOOD PRESSURE: 122 MMHG | HEART RATE: 84 BPM | OXYGEN SATURATION: 98 % | TEMPERATURE: 97.6 F | DIASTOLIC BLOOD PRESSURE: 80 MMHG | RESPIRATION RATE: 18 BRPM

## 2023-04-17 DIAGNOSIS — J30.1 ALLERGY TO TREES: ICD-10-CM

## 2023-04-17 DIAGNOSIS — Z51.6 DESENSITIZATION TO ALLERGENS: Primary | ICD-10-CM

## 2023-04-17 DIAGNOSIS — Z91.048 ALLERGY TO MOLD: ICD-10-CM

## 2023-04-17 PROCEDURE — 95117 IMMUNOTHERAPY INJECTIONS: CPT | Performed by: NURSE PRACTITIONER

## 2023-04-18 ENCOUNTER — TELEPHONE (OUTPATIENT)
Dept: FAMILY MEDICINE CLINIC | Age: 48
End: 2023-04-18

## 2023-04-18 NOTE — TELEPHONE ENCOUNTER
----- Message from Archana Simmons MD sent at 4/18/2023 12:44 PM EDT -----  Chol 162  and ldl 91 so all good     Liver and kidney functions  thyroid wnl     Please call

## 2023-04-24 ENCOUNTER — OFFICE VISIT (OUTPATIENT)
Dept: ENT CLINIC | Age: 48
End: 2023-04-24
Payer: COMMERCIAL

## 2023-04-24 ENCOUNTER — NURSE ONLY (OUTPATIENT)
Dept: ALLERGY | Age: 48
End: 2023-04-24
Payer: COMMERCIAL

## 2023-04-24 VITALS
OXYGEN SATURATION: 98 % | HEART RATE: 87 BPM | TEMPERATURE: 97.3 F | DIASTOLIC BLOOD PRESSURE: 78 MMHG | HEIGHT: 63 IN | RESPIRATION RATE: 16 BRPM | BODY MASS INDEX: 32.43 KG/M2 | WEIGHT: 183 LBS | SYSTOLIC BLOOD PRESSURE: 118 MMHG

## 2023-04-24 VITALS
DIASTOLIC BLOOD PRESSURE: 78 MMHG | HEART RATE: 87 BPM | SYSTOLIC BLOOD PRESSURE: 118 MMHG | TEMPERATURE: 97.3 F | RESPIRATION RATE: 16 BRPM | OXYGEN SATURATION: 98 %

## 2023-04-24 DIAGNOSIS — J34.89 INTRANASAL SYNECHIAE: ICD-10-CM

## 2023-04-24 DIAGNOSIS — J30.2 SEASONAL ALLERGIES: ICD-10-CM

## 2023-04-24 DIAGNOSIS — J33.9 NASAL POLYPOSIS: ICD-10-CM

## 2023-04-24 DIAGNOSIS — Z51.6 DESENSITIZATION TO ALLERGENS: Primary | ICD-10-CM

## 2023-04-24 DIAGNOSIS — R09.82 POST-NASAL DRAINAGE: ICD-10-CM

## 2023-04-24 DIAGNOSIS — J32.9 CHRONIC RHINOSINUSITIS: Primary | ICD-10-CM

## 2023-04-24 DIAGNOSIS — R09.81 NASAL CONGESTION: ICD-10-CM

## 2023-04-24 DIAGNOSIS — Z91.048 ALLERGY TO MOLD: ICD-10-CM

## 2023-04-24 DIAGNOSIS — J30.1 ALLERGY TO TREES: ICD-10-CM

## 2023-04-24 DIAGNOSIS — J31.0 CHRONIC RHINOSINUSITIS: Primary | ICD-10-CM

## 2023-04-24 PROCEDURE — 95117 IMMUNOTHERAPY INJECTIONS: CPT | Performed by: NURSE PRACTITIONER

## 2023-04-24 PROCEDURE — 99214 OFFICE O/P EST MOD 30 MIN: CPT | Performed by: OTOLARYNGOLOGY

## 2023-04-25 NOTE — PROGRESS NOTES
[facility] [place] [facility]  [facility] EAR, NOSE AND THROAT  [facility]  Dept: [phone]  Dept Fax: [phone]  Loc: [phone]    Lashae Simmons is a 49 y.o. female who was referred by No ref. provider found for:  Chief Complaint   Patient presents with    Follow-up     Patient is here for 2 month f/u sinuses r/s from 3/28         HPI:     Lashae Simmons is a 49 y.o. female with a history of severe chronic rhinosinusitis and extensive endonasal and paranasal sinus disease. She is status post numerous endoscopic sinus operations and treatments of deep complications that include probable osteomyelitis and recurring nasal polyposis. She is here today reporting the longest hiatus of any serious paranasal sinus symptoms that she has had since her long association began. She reports being very pleased to say that she is not only breathing comfortably but not experiencing untoward secretions purulence or pain anywhere in her face or paranasal sinus regions. She is sleeping comfortably and breathing comfortably day and night. History:      Allergies   Allergen Reactions    [place] [Macadamia Nut Oil]      Almonds per allergy testing    Seasonal      Current Outpatient Medications   Medication Sig Dispense Refill    dupilumab (DUPIXENT) 300 MG/2ML SOSY injection Inject 2 mLs into the skin every 14 days 2 each 11    EPINEPHrine (EPIPEN 2-KAYE) 0.3 MG/0.3ML SOAJ injection Inject one pen as directed STAT for allergic reaction, may disp generic NDC 29395-084-38 1 each 1    albuterol (PROVENTIL) (2.5 MG/3ML) 0.083% nebulizer solution Take 3 mLs by nebulization 4 times daily 120 mL 3    meclizine (ANTIVERT) 25 MG tablet Take 1 tablet by mouth 4 times daily as needed for Dizziness 40 tablet 1    Fluticasone Propionate (XHANCE) 93 MCG/ACT EXHU 1 spray by Nasal route 2 times daily 6.3 mL 11    montelukast (SINGULAIR) 10 MG tablet Take 1 tablet by mouth

## 2023-05-03 ENCOUNTER — NURSE ONLY (OUTPATIENT)
Dept: ALLERGY | Age: 48
End: 2023-05-03
Payer: COMMERCIAL

## 2023-05-03 VITALS
RESPIRATION RATE: 18 BRPM | OXYGEN SATURATION: 98 % | DIASTOLIC BLOOD PRESSURE: 70 MMHG | SYSTOLIC BLOOD PRESSURE: 122 MMHG | HEART RATE: 93 BPM

## 2023-05-03 DIAGNOSIS — J30.1 ALLERGY TO TREES: ICD-10-CM

## 2023-05-03 DIAGNOSIS — Z51.6 DESENSITIZATION TO ALLERGENS: Primary | ICD-10-CM

## 2023-05-03 DIAGNOSIS — Z91.048 ALLERGY TO MOLD: ICD-10-CM

## 2023-05-03 PROCEDURE — 95117 IMMUNOTHERAPY INJECTIONS: CPT | Performed by: NURSE PRACTITIONER

## 2023-05-08 ENCOUNTER — NURSE ONLY (OUTPATIENT)
Dept: ALLERGY | Age: 48
End: 2023-05-08
Payer: COMMERCIAL

## 2023-05-08 VITALS — OXYGEN SATURATION: 98 % | HEART RATE: 82 BPM | DIASTOLIC BLOOD PRESSURE: 82 MMHG | SYSTOLIC BLOOD PRESSURE: 140 MMHG

## 2023-05-08 DIAGNOSIS — J30.1 ALLERGY TO TREES: ICD-10-CM

## 2023-05-08 DIAGNOSIS — Z91.048 ALLERGY TO MOLD: ICD-10-CM

## 2023-05-08 DIAGNOSIS — Z51.6 DESENSITIZATION TO ALLERGENS: Primary | ICD-10-CM

## 2023-05-08 PROCEDURE — 95117 IMMUNOTHERAPY INJECTIONS: CPT | Performed by: NURSE PRACTITIONER

## 2023-05-10 DIAGNOSIS — Z29.8 IMMUNOTHERAPY: ICD-10-CM

## 2023-05-10 DIAGNOSIS — J30.9 CHRONIC ALLERGIC RHINITIS: Primary | ICD-10-CM

## 2023-05-10 PROCEDURE — 95165 ANTIGEN THERAPY SERVICES: CPT | Performed by: NURSE PRACTITIONER

## 2023-05-10 NOTE — PROGRESS NOTES
ALLERGY EXTRACT MAINTENANCE MIXING RED VIAL ONLY  Current vials empty    Provider onsite during allergy extract preparation. TOTAL VIALS=  3  TOTAL DOSES PER VIAL = 20  TOTAL ANTICIPATED DOSES PREPARED = 60      An allergy extract maintenance solution was prepared in red vial according to the maintenance prescription. The refrigerator shelf-life for each red vial is 12 months or as indicated on the label. Patient vials were labeled with name, date-of-birth, vial number, concentration, and expiration. When injecting from a new maintenance vial, the first injections should be three doses below the previous maintenance dose. This is done because the refill may be slightly stronger than the vial that was just emptied. (Example: if the maintenance dose is 0.5, start at 0.35 and proceed to 0.4, 0.45, 0.5). The three build up injections may be given weekly until the maintenance dosage is reached again. Then every other week injections may be resumed. Patients on maintenance should be seen by the allergy provider every 6-12 months and as needed. The injection record and serum is reviewed and documented at every injection appointment. When down to the last 1/3 of the bottle, a maintenance refill will be prepared (pending patient is without reactions) for next refill. Patients requesting refills that receive injections at outside medical facilities must include the patient's demographic sheet, current insurance information and the injection records. Allow 2-3 weeks for delivery after the refill has been requested. PROTOCOL FOR LATE INJECTIONS  Days late determined from the due date of the injection    Shot Frequency 5-10 Days Late 11-16 Days Late 17-30 Days Late 31-60 Days Late 61+ Days Late   Twice Weekly Repeat last dose Reduce last dose . 2 Reduce last dose . 4 Dilute back 1 vial, start at .05 Patient must start over     Weekly Repeat last dose Reduce last dose . 2 Reduce last dose . 4 Dilute back

## 2023-05-15 ENCOUNTER — NURSE ONLY (OUTPATIENT)
Dept: ALLERGY | Age: 48
End: 2023-05-15
Payer: COMMERCIAL

## 2023-05-15 VITALS
HEART RATE: 77 BPM | DIASTOLIC BLOOD PRESSURE: 80 MMHG | SYSTOLIC BLOOD PRESSURE: 122 MMHG | OXYGEN SATURATION: 97 % | TEMPERATURE: 97.1 F

## 2023-05-15 DIAGNOSIS — J30.1 ALLERGY TO TREES: ICD-10-CM

## 2023-05-15 DIAGNOSIS — Z91.048 ALLERGY TO MOLD: ICD-10-CM

## 2023-05-15 DIAGNOSIS — Z51.6 DESENSITIZATION TO ALLERGENS: Primary | ICD-10-CM

## 2023-05-15 PROCEDURE — 95117 IMMUNOTHERAPY INJECTIONS: CPT | Performed by: NURSE PRACTITIONER

## 2023-05-22 ENCOUNTER — NURSE ONLY (OUTPATIENT)
Dept: ALLERGY | Age: 48
End: 2023-05-22
Payer: COMMERCIAL

## 2023-05-22 VITALS
DIASTOLIC BLOOD PRESSURE: 76 MMHG | HEART RATE: 73 BPM | TEMPERATURE: 98.1 F | RESPIRATION RATE: 16 BRPM | OXYGEN SATURATION: 96 % | SYSTOLIC BLOOD PRESSURE: 122 MMHG

## 2023-05-22 DIAGNOSIS — Z51.6 DESENSITIZATION TO ALLERGENS: Primary | ICD-10-CM

## 2023-05-22 DIAGNOSIS — Z91.048 ALLERGY TO MOLD: ICD-10-CM

## 2023-05-22 PROCEDURE — 95117 IMMUNOTHERAPY INJECTIONS: CPT | Performed by: NURSE PRACTITIONER

## 2023-05-31 ENCOUNTER — NURSE ONLY (OUTPATIENT)
Dept: ALLERGY | Age: 48
End: 2023-05-31
Payer: COMMERCIAL

## 2023-05-31 VITALS
OXYGEN SATURATION: 96 % | SYSTOLIC BLOOD PRESSURE: 120 MMHG | DIASTOLIC BLOOD PRESSURE: 78 MMHG | RESPIRATION RATE: 18 BRPM | HEART RATE: 82 BPM

## 2023-05-31 DIAGNOSIS — Z91.048 ALLERGY TO MOLD: ICD-10-CM

## 2023-05-31 DIAGNOSIS — Z51.6 DESENSITIZATION TO ALLERGENS: Primary | ICD-10-CM

## 2023-05-31 DIAGNOSIS — J30.1 ALLERGY TO TREES: ICD-10-CM

## 2023-05-31 PROCEDURE — 95117 IMMUNOTHERAPY INJECTIONS: CPT | Performed by: NURSE PRACTITIONER

## 2023-06-07 ENCOUNTER — NURSE ONLY (OUTPATIENT)
Dept: ALLERGY | Age: 48
End: 2023-06-07
Payer: COMMERCIAL

## 2023-06-07 VITALS
OXYGEN SATURATION: 98 % | DIASTOLIC BLOOD PRESSURE: 72 MMHG | SYSTOLIC BLOOD PRESSURE: 116 MMHG | HEART RATE: 76 BPM | RESPIRATION RATE: 16 BRPM

## 2023-06-07 DIAGNOSIS — Z91.048 ALLERGY TO MOLD: ICD-10-CM

## 2023-06-07 DIAGNOSIS — J30.1 ALLERGY TO TREES: ICD-10-CM

## 2023-06-07 DIAGNOSIS — Z51.6 DESENSITIZATION TO ALLERGENS: Primary | ICD-10-CM

## 2023-06-07 PROCEDURE — 95117 IMMUNOTHERAPY INJECTIONS: CPT | Performed by: NURSE PRACTITIONER

## 2023-06-22 ENCOUNTER — NURSE ONLY (OUTPATIENT)
Dept: ALLERGY | Age: 48
End: 2023-06-22
Payer: COMMERCIAL

## 2023-06-22 VITALS
HEART RATE: 79 BPM | DIASTOLIC BLOOD PRESSURE: 76 MMHG | OXYGEN SATURATION: 96 % | RESPIRATION RATE: 16 BRPM | TEMPERATURE: 98.2 F | SYSTOLIC BLOOD PRESSURE: 122 MMHG

## 2023-06-22 DIAGNOSIS — Z91.048 ALLERGY TO MOLD: Primary | ICD-10-CM

## 2023-06-22 DIAGNOSIS — J30.1 ALLERGY TO TREES: ICD-10-CM

## 2023-06-22 DIAGNOSIS — Z51.6 DESENSITIZATION TO ALLERGENS: ICD-10-CM

## 2023-06-22 PROCEDURE — 95117 IMMUNOTHERAPY INJECTIONS: CPT | Performed by: NURSE PRACTITIONER

## 2023-06-26 ENCOUNTER — NURSE ONLY (OUTPATIENT)
Dept: ALLERGY | Age: 48
End: 2023-06-26
Payer: COMMERCIAL

## 2023-06-26 VITALS — DIASTOLIC BLOOD PRESSURE: 80 MMHG | SYSTOLIC BLOOD PRESSURE: 118 MMHG | HEART RATE: 74 BPM | OXYGEN SATURATION: 96 %

## 2023-06-26 DIAGNOSIS — J30.1 ALLERGY TO TREES: ICD-10-CM

## 2023-06-26 DIAGNOSIS — Z51.6 DESENSITIZATION TO ALLERGENS: ICD-10-CM

## 2023-06-26 DIAGNOSIS — Z91.048 ALLERGY TO MOLD: Primary | ICD-10-CM

## 2023-06-26 PROCEDURE — 95117 IMMUNOTHERAPY INJECTIONS: CPT | Performed by: NURSE PRACTITIONER

## 2023-07-05 ENCOUNTER — NURSE ONLY (OUTPATIENT)
Dept: ALLERGY | Age: 48
End: 2023-07-05
Payer: COMMERCIAL

## 2023-07-05 VITALS
HEART RATE: 74 BPM | SYSTOLIC BLOOD PRESSURE: 122 MMHG | TEMPERATURE: 97.3 F | DIASTOLIC BLOOD PRESSURE: 74 MMHG | RESPIRATION RATE: 16 BRPM | OXYGEN SATURATION: 95 %

## 2023-07-05 DIAGNOSIS — J30.1 ALLERGY TO TREES: Primary | ICD-10-CM

## 2023-07-05 DIAGNOSIS — Z51.6 DESENSITIZATION TO ALLERGENS: ICD-10-CM

## 2023-07-05 DIAGNOSIS — Z91.048 ALLERGY TO MOLD: ICD-10-CM

## 2023-07-05 PROCEDURE — 95117 IMMUNOTHERAPY INJECTIONS: CPT | Performed by: NURSE PRACTITIONER

## 2023-07-05 NOTE — PROGRESS NOTES
After consent obtained/verified, allergy injection given in back of R/L arm(s). VIAL COLOR OF ALL VIALS TODAY IS red    ALLERGY INJECTION FROM VIAL A GIVEN right  UPPER ARM IN THE AMOUNT OF 0.50 ML    ALLERGY INJECTION FROM VIAL B GIVEN left lower ARM IN THE AMOUNT OF 0.50 ML    ALLERGY INJECTION FROM VIAL C GIVEN leftupper ARM IN THE AMOUNT OF 0.50 ML      Documentation of vial injection specific to arm(s) noted on Allergy Immunotherapy Administration Form. Patient waited 30 minutes for observation. No      Patient tolerated well without adverse reaction WHILE IN OFFICE    SHOT REACTION TREATMENT INSTRUCTIONS    During the 30 minute wait after an allergy injection the following symptoms should be reported:    Itching other than at the injection site  Hives or swelling other than at the injection site  Redness other than at the injection site  Difficulty breathing  Chest tightness  Difficulty swallowing  Throat tightness    If these symptoms occur, NOTIFY PROVIDER and the following treatment should be administered:    1. Epinephrine/Auvi Q 1:1000 IM - 0.3 ml if > 66 lbs or more, 0.15 ml if 33 - 63 lbs, or 0.1 ml if <33 lbs     2. Diphenhydramine - give all intramuscular:     2 to <6 years (off-label use): 6.25 mg,    6 to <12 years: 12.5 to 25 mg;    ?12 years: 25-50 mg.    3.  Famotidine:  Adults 40 mg oral    Adolescents age 12 years and >88 lbs: 40 mg    Children and Adolescents ? 12years of age: Initial: 0.25 mg/kg/dose  every 12 hours (maximum daily dose: 40 mg/day)    Epi/Auvi Q dose may me repeated in 5-15 minutes if adequate resolution of symptoms does not occur    Patient should be observed for at least one hour after final Epi/Auvi Q dose and must be seen by provider. Patients cannot drive themselves if they have received diphenhydramine. After consent obtained/verified, allergy injection given in back of R/L arm(s).       VIAL COLOR OF ALL VIALS TODAY IS red    ALLERGY INJECTION FROM VIAL A

## 2023-07-11 ENCOUNTER — TRANSCRIBE ORDERS (OUTPATIENT)
Dept: ADMINISTRATIVE | Age: 48
End: 2023-07-11

## 2023-07-11 DIAGNOSIS — N63.10 MASS OF RIGHT BREAST, UNSPECIFIED QUADRANT: Primary | ICD-10-CM

## 2023-07-14 ENCOUNTER — HOSPITAL ENCOUNTER (OUTPATIENT)
Dept: WOMENS IMAGING | Age: 48
Discharge: HOME | End: 2023-07-14
Payer: COMMERCIAL

## 2023-07-14 DIAGNOSIS — N63.10 MASS OF RIGHT BREAST, UNSPECIFIED QUADRANT: ICD-10-CM

## 2023-07-14 PROCEDURE — 76642 ULTRASOUND BREAST LIMITED: CPT

## 2023-07-14 PROCEDURE — G0279 TOMOSYNTHESIS, MAMMO: HCPCS

## 2023-07-17 ENCOUNTER — NURSE ONLY (OUTPATIENT)
Dept: ALLERGY | Age: 48
End: 2023-07-17
Payer: COMMERCIAL

## 2023-07-17 VITALS — DIASTOLIC BLOOD PRESSURE: 80 MMHG | HEART RATE: 81 BPM | SYSTOLIC BLOOD PRESSURE: 134 MMHG | OXYGEN SATURATION: 96 %

## 2023-07-17 DIAGNOSIS — J30.1 ALLERGY TO TREES: Primary | ICD-10-CM

## 2023-07-17 DIAGNOSIS — Z51.6 DESENSITIZATION TO ALLERGENS: ICD-10-CM

## 2023-07-17 DIAGNOSIS — Z91.048 ALLERGY TO MOLD: ICD-10-CM

## 2023-07-17 PROCEDURE — 95117 IMMUNOTHERAPY INJECTIONS: CPT | Performed by: NURSE PRACTITIONER

## 2023-08-01 ENCOUNTER — NURSE ONLY (OUTPATIENT)
Dept: ALLERGY | Age: 48
End: 2023-08-01
Payer: COMMERCIAL

## 2023-08-01 DIAGNOSIS — Z51.6 DESENSITIZATION TO ALLERGENS: ICD-10-CM

## 2023-08-01 DIAGNOSIS — J33.9 NASAL POLYPOSIS: ICD-10-CM

## 2023-08-01 DIAGNOSIS — Z91.09 ALLERGY TO AMERICAN HOUSE DUST MITE: ICD-10-CM

## 2023-08-01 DIAGNOSIS — J30.81 ANIMAL DANDER ALLERGY: ICD-10-CM

## 2023-08-01 DIAGNOSIS — J33.9 NASAL POLYPS: ICD-10-CM

## 2023-08-01 DIAGNOSIS — Z91.048 ALLERGY TO MOLD: ICD-10-CM

## 2023-08-01 DIAGNOSIS — J30.9 ALLERGIC RHINOCONJUNCTIVITIS: ICD-10-CM

## 2023-08-01 DIAGNOSIS — J30.1 ALLERGY TO TREES: Primary | ICD-10-CM

## 2023-08-01 DIAGNOSIS — J30.81 ALLERGY TO CATS: ICD-10-CM

## 2023-08-01 DIAGNOSIS — H10.10 ALLERGIC RHINOCONJUNCTIVITIS: ICD-10-CM

## 2023-08-01 PROCEDURE — 95117 IMMUNOTHERAPY INJECTIONS: CPT | Performed by: NURSE PRACTITIONER

## 2023-08-01 NOTE — PROGRESS NOTES
After consent obtained/verified, allergy injection given in back of R/L arm(s). VIAL COLOR OF ALL VIALS TODAY IS red 1:1    ALLERGY INJECTION FROM VIAL A GIVEN right  UPPER ARM IN THE AMOUNT OF 0.50 ML    ALLERGY INJECTION FROM VIAL B GIVEN left lower ARM IN THE AMOUNT OF 0.50  ML    ALLERGY INJECTION FROM VIAL C GIVEN leftlower ARM IN THE AMOUNT OF 0.50 ML      Documentation of vial injection specific to arm(s) noted on Allergy Immunotherapy Administration Form. Patient waited 30 minutes for observation. No      Patient tolerated well without adverse reaction WHILE IN OFFICE    SHOT REACTION TREATMENT INSTRUCTIONS    During the 30 minute wait after an allergy injection the following symptoms should be reported:    Itching other than at the injection site  Hives or swelling other than at the injection site  Redness other than at the injection site  Difficulty breathing  Chest tightness  Difficulty swallowing  Throat tightness    If these symptoms occur, NOTIFY PROVIDER and the following treatment should be administered:    1. Epinephrine/Auvi Q 1:1000 IM - 0.3 ml if > 66 lbs or more, 0.15 ml if 33 - 63 lbs, or 0.1 ml if <33 lbs     2. Diphenhydramine - give all intramuscular:     2 to <6 years (off-label use): 6.25 mg,    6 to <12 years: 12.5 to 25 mg;    ?12 years: 25-50 mg.    3.  Famotidine:  Adults 40 mg oral    Adolescents age 12 years and >88 lbs: 40 mg    Children and Adolescents ? 12years of age: Initial: 0.25 mg/kg/dose  every 12 hours (maximum daily dose: 40 mg/day)    Epi/Auvi Q dose may me repeated in 5-15 minutes if adequate resolution of symptoms does not occur    Patient should be observed for at least one hour after final Epi/Auvi Q dose and must be seen by provider. Patients cannot drive themselves if they have received diphenhydramine.

## 2023-08-18 ENCOUNTER — NURSE ONLY (OUTPATIENT)
Dept: ALLERGY | Age: 48
End: 2023-08-18

## 2023-08-18 VITALS
HEART RATE: 71 BPM | OXYGEN SATURATION: 98 % | RESPIRATION RATE: 16 BRPM | DIASTOLIC BLOOD PRESSURE: 82 MMHG | SYSTOLIC BLOOD PRESSURE: 116 MMHG

## 2023-08-18 DIAGNOSIS — J30.1 ALLERGY TO TREES: ICD-10-CM

## 2023-08-18 DIAGNOSIS — Z91.048 ALLERGY TO MOLD: ICD-10-CM

## 2023-08-18 DIAGNOSIS — Z51.6 DESENSITIZATION TO ALLERGENS: Primary | ICD-10-CM

## 2023-08-18 NOTE — PROGRESS NOTES
After consent obtained/verified, allergy injection given in back of R/L arm(s). VIAL COLOR OF ALL VIALS TODAY IS red 1:1    ALLERGY INJECTION FROM VIAL A GIVEN left  UPPER ARM IN THE AMOUNT OF 0.50 ML    ALLERGY INJECTION FROM VIAL B GIVEN right lower ARM IN THE AMOUNT OF 0.50  ML    ALLERGY INJECTION FROM VIAL C GIVEN rightupper ARM IN THE AMOUNT OF 0.50 ML      Documentation of vial injection specific to arm(s) noted on Allergy Immunotherapy Administration Form. Patient waited 30 minutes for observation. No      Patient tolerated well without adverse reaction WHILE IN OFFICE    SHOT REACTION TREATMENT INSTRUCTIONS    During the 30 minute wait after an allergy injection the following symptoms should be reported:    Itching other than at the injection site  Hives or swelling other than at the injection site  Redness other than at the injection site  Difficulty breathing  Chest tightness  Difficulty swallowing  Throat tightness    If these symptoms occur, NOTIFY PROVIDER and the following treatment should be administered:    1. Epinephrine/Auvi Q 1:1000 IM - 0.3 ml if > 66 lbs or more, 0.15 ml if 33 - 63 lbs, or 0.1 ml if <33 lbs     2. Diphenhydramine - give all intramuscular:     2 to <6 years (off-label use): 6.25 mg,    6 to <12 years: 12.5 to 25 mg;    ?12 years: 25-50 mg.    3.  Famotidine:  Adults 40 mg oral    Adolescents age 12 years and >88 lbs: 40 mg    Children and Adolescents ? 12years of age: Initial: 0.25 mg/kg/dose  every 12 hours (maximum daily dose: 40 mg/day)    Epi/Auvi Q dose may me repeated in 5-15 minutes if adequate resolution of symptoms does not occur    Patient should be observed for at least one hour after final Epi/Auvi Q dose and must be seen by provider. Patients cannot drive themselves if they have received diphenhydramine.

## 2023-08-23 ENCOUNTER — TELEPHONE (OUTPATIENT)
Dept: FAMILY MEDICINE CLINIC | Age: 48
End: 2023-08-23

## 2023-08-23 ENCOUNTER — OFFICE VISIT (OUTPATIENT)
Dept: FAMILY MEDICINE CLINIC | Age: 48
End: 2023-08-23

## 2023-08-23 VITALS
BODY MASS INDEX: 33.13 KG/M2 | WEIGHT: 187 LBS | HEIGHT: 63 IN | RESPIRATION RATE: 16 BRPM | HEART RATE: 84 BPM | SYSTOLIC BLOOD PRESSURE: 124 MMHG | DIASTOLIC BLOOD PRESSURE: 82 MMHG

## 2023-08-23 DIAGNOSIS — J30.2 SEASONAL ALLERGIES: ICD-10-CM

## 2023-08-23 DIAGNOSIS — J30.9 ALLERGIC RHINITIS, UNSPECIFIED SEASONALITY, UNSPECIFIED TRIGGER: ICD-10-CM

## 2023-08-23 DIAGNOSIS — K21.9 GASTROESOPHAGEAL REFLUX DISEASE WITHOUT ESOPHAGITIS: Primary | ICD-10-CM

## 2023-08-23 LAB
HELIOBACTER PYLORI ID: NEGATIVE
HGB, POC: 13.7

## 2023-08-23 PROCEDURE — 87880 STREP A ASSAY W/OPTIC: CPT | Performed by: FAMILY MEDICINE

## 2023-08-23 PROCEDURE — 85018 HEMOGLOBIN: CPT | Performed by: FAMILY MEDICINE

## 2023-08-23 PROCEDURE — 99213 OFFICE O/P EST LOW 20 MIN: CPT | Performed by: FAMILY MEDICINE

## 2023-08-23 RX ORDER — PANTOPRAZOLE SODIUM 40 MG/1
40 TABLET, DELAYED RELEASE ORAL
Qty: 90 TABLET | Refills: 0 | Status: SHIPPED | OUTPATIENT
Start: 2023-08-23

## 2023-08-23 SDOH — ECONOMIC STABILITY: FOOD INSECURITY: WITHIN THE PAST 12 MONTHS, THE FOOD YOU BOUGHT JUST DIDN'T LAST AND YOU DIDN'T HAVE MONEY TO GET MORE.: NEVER TRUE

## 2023-08-23 SDOH — ECONOMIC STABILITY: INCOME INSECURITY: HOW HARD IS IT FOR YOU TO PAY FOR THE VERY BASICS LIKE FOOD, HOUSING, MEDICAL CARE, AND HEATING?: NOT HARD AT ALL

## 2023-08-23 SDOH — ECONOMIC STABILITY: HOUSING INSECURITY
IN THE LAST 12 MONTHS, WAS THERE A TIME WHEN YOU DID NOT HAVE A STEADY PLACE TO SLEEP OR SLEPT IN A SHELTER (INCLUDING NOW)?: NO

## 2023-08-23 SDOH — ECONOMIC STABILITY: FOOD INSECURITY: WITHIN THE PAST 12 MONTHS, YOU WORRIED THAT YOUR FOOD WOULD RUN OUT BEFORE YOU GOT MONEY TO BUY MORE.: NEVER TRUE

## 2023-08-23 ASSESSMENT — ENCOUNTER SYMPTOMS
COUGH: 0
SHORTNESS OF BREATH: 0
CONSTIPATION: 0
EYE PAIN: 0
CHEST TIGHTNESS: 0
HOARSE VOICE: 1
HEARTBURN: 1
NAUSEA: 0
BELCHING: 0
SORE THROAT: 0
ABDOMINAL PAIN: 1
WHEEZING: 0

## 2023-08-23 ASSESSMENT — PATIENT HEALTH QUESTIONNAIRE - PHQ9
SUM OF ALL RESPONSES TO PHQ QUESTIONS 1-9: 1
SUM OF ALL RESPONSES TO PHQ QUESTIONS 1-9: 1
SUM OF ALL RESPONSES TO PHQ9 QUESTIONS 1 & 2: 1
SUM OF ALL RESPONSES TO PHQ QUESTIONS 1-9: 1
2. FEELING DOWN, DEPRESSED OR HOPELESS: 0
1. LITTLE INTEREST OR PLEASURE IN DOING THINGS: 1
SUM OF ALL RESPONSES TO PHQ QUESTIONS 1-9: 1

## 2023-08-23 NOTE — PROGRESS NOTES
Subjective:      Patient ID: Ze King is a 51 y.o. female. Diet    with  full  stomach and  citrus and  coffee and    late  at  night and   etoh    Gastroesophageal Reflux  She complains of abdominal pain, heartburn and a hoarse voice. She reports no belching, no chest pain, no coughing, no nausea, no sore throat or no wheezing. The problem occurs frequently. The problem has been resolved. The heartburn is located in the substernum. The heartburn wakes her from sleep. The symptoms are aggravated by certain foods. Pertinent negatives include no fatigue. She has tried a PPI (prilosec) for the symptoms. Past Medical History:   Diagnosis Date    Allergic rhinitis     Asthma     Chronic maxillary sinusitis 04/05/2021    COVID 01/09/2022    Nasal polyposis     Panic attacks 04/1998    PONV (postoperative nausea and vomiting)     Scopalamine patch    Seasonal allergies 04/1999    Sinusitis, chronic       Review of Systems   Constitutional:  Negative for appetite change, fatigue and fever. HENT:  Positive for hoarse voice. Negative for congestion, ear pain, postnasal drip and sore throat. Eyes:  Negative for pain and visual disturbance. Respiratory:  Negative for cough, chest tightness, shortness of breath and wheezing. Cardiovascular:  Negative for chest pain, palpitations and leg swelling. Gastrointestinal:  Positive for abdominal pain and heartburn. Negative for constipation and nausea. Genitourinary:  Negative for dysuria and frequency. Musculoskeletal:  Negative for arthralgias, joint swelling, neck pain and neck stiffness. Skin:  Negative for rash. Neurological:  Negative for dizziness, weakness, numbness and headaches. Hematological:  Negative for adenopathy. Does not bruise/bleed easily. Psychiatric/Behavioral:  Negative for behavioral problems and sleep disturbance. The patient is not nervous/anxious.     /82 (Site: Right Upper Arm, Position: Sitting, Cuff Size: Medium

## 2023-08-23 NOTE — TELEPHONE ENCOUNTER
Faxed referral over to Dr Beti Pizano office faxed referral, office note, demographics, insurance card.

## 2023-08-24 ENCOUNTER — NURSE ONLY (OUTPATIENT)
Dept: ALLERGY | Age: 48
End: 2023-08-24

## 2023-08-24 VITALS — HEART RATE: 87 BPM | SYSTOLIC BLOOD PRESSURE: 122 MMHG | DIASTOLIC BLOOD PRESSURE: 78 MMHG

## 2023-08-24 DIAGNOSIS — Z51.6 DESENSITIZATION TO ALLERGENS: Primary | ICD-10-CM

## 2023-08-24 DIAGNOSIS — Z91.048 ALLERGY TO MOLD: ICD-10-CM

## 2023-08-24 DIAGNOSIS — J30.1 ALLERGY TO TREES: ICD-10-CM

## 2023-08-24 NOTE — PROGRESS NOTES
After consent obtained/verified, allergy injection given in back of R/L arm(s). VIAL COLOR OF ALL VIALS TODAY IS red 1:1    ALLERGY INJECTION FROM VIAL A GIVEN right  UPPER ARM IN THE AMOUNT OF 0.50 ML    ALLERGY INJECTION FROM VIAL B GIVEN left upper ARM IN THE AMOUNT OF 0.50  ML    ALLERGY INJECTION FROM VIAL C GIVEN leftlower ARM IN THE AMOUNT OF 0.50 ML      Documentation of vial injection specific to arm(s) noted on Allergy Immunotherapy Administration Form. Patient waited 30 minutes for observation. No      Patient tolerated well without adverse reaction WHILE IN OFFICE    SHOT REACTION TREATMENT INSTRUCTIONS    During the 30 minute wait after an allergy injection the following symptoms should be reported:    Itching other than at the injection site  Hives or swelling other than at the injection site  Redness other than at the injection site  Difficulty breathing  Chest tightness  Difficulty swallowing  Throat tightness    If these symptoms occur, NOTIFY PROVIDER and the following treatment should be administered:    1. Epinephrine/Auvi Q 1:1000 IM - 0.3 ml if > 66 lbs or more, 0.15 ml if 33 - 63 lbs, or 0.1 ml if <33 lbs     2. Diphenhydramine - give all intramuscular:     2 to <6 years (off-label use): 6.25 mg,    6 to <12 years: 12.5 to 25 mg;    ?12 years: 25-50 mg.    3.  Famotidine:  Adults 40 mg oral    Adolescents age 12 years and >88 lbs: 40 mg    Children and Adolescents ? 12years of age: Initial: 0.25 mg/kg/dose  every 12 hours (maximum daily dose: 40 mg/day)    Epi/Auvi Q dose may me repeated in 5-15 minutes if adequate resolution of symptoms does not occur    Patient should be observed for at least one hour after final Epi/Auvi Q dose and must be seen by provider. Patients cannot drive themselves if they have received diphenhydramine. After consent obtained/verified, allergy injection given in back of R/L arm(s).       VIAL COLOR OF ALL VIALS TODAY IS red 1:1    ALLERGY INJECTION

## 2023-08-31 ENCOUNTER — NURSE ONLY (OUTPATIENT)
Dept: ALLERGY | Age: 48
End: 2023-08-31

## 2023-08-31 DIAGNOSIS — J30.1 ALLERGY TO TREES: ICD-10-CM

## 2023-08-31 DIAGNOSIS — Z91.048 ALLERGY TO MOLD: ICD-10-CM

## 2023-08-31 DIAGNOSIS — Z51.6 DESENSITIZATION TO ALLERGENS: Primary | ICD-10-CM

## 2023-09-07 ENCOUNTER — NURSE ONLY (OUTPATIENT)
Dept: ALLERGY | Age: 48
End: 2023-09-07
Payer: COMMERCIAL

## 2023-09-07 VITALS
DIASTOLIC BLOOD PRESSURE: 84 MMHG | SYSTOLIC BLOOD PRESSURE: 114 MMHG | OXYGEN SATURATION: 97 % | HEART RATE: 76 BPM | RESPIRATION RATE: 14 BRPM

## 2023-09-07 DIAGNOSIS — Z51.6 DESENSITIZATION TO ALLERGENS: ICD-10-CM

## 2023-09-07 DIAGNOSIS — J30.1 ALLERGY TO TREES: ICD-10-CM

## 2023-09-07 DIAGNOSIS — Z91.048 ALLERGY TO MOLD: Primary | ICD-10-CM

## 2023-09-07 PROCEDURE — 95117 IMMUNOTHERAPY INJECTIONS: CPT | Performed by: NURSE PRACTITIONER

## 2023-09-07 NOTE — PROGRESS NOTES
After consent obtained/verified, allergy injection given in back of R/L arm(s). VIAL COLOR OF ALL VIALS TODAY IS red 1:1    ALLERGY INJECTION FROM VIAL A GIVEN right  UPPER ARM IN THE AMOUNT OF 0.50 ML    ALLERGY INJECTION FROM VIAL B GIVEN left lower ARM IN THE AMOUNT OF 0.50  ML    ALLERGY INJECTION FROM VIAL C GIVEN leftupper ARM IN THE AMOUNT OF 0.50 ML      Documentation of vial injection specific to arm(s) noted on Allergy Immunotherapy Administration Form. Patient waited 30 minutes for observation. No      Patient tolerated well without adverse reaction WHILE IN OFFICE    SHOT REACTION TREATMENT INSTRUCTIONS    During the 30 minute wait after an allergy injection the following symptoms should be reported:    Itching other than at the injection site  Hives or swelling other than at the injection site  Redness other than at the injection site  Difficulty breathing  Chest tightness  Difficulty swallowing  Throat tightness    If these symptoms occur, NOTIFY PROVIDER and the following treatment should be administered:    1. Epinephrine/Auvi Q 1:1000 IM - 0.3 ml if > 66 lbs or more, 0.15 ml if 33 - 63 lbs, or 0.1 ml if <33 lbs     2. Diphenhydramine - give all intramuscular:     2 to <6 years (off-label use): 6.25 mg,    6 to <12 years: 12.5 to 25 mg;    ?12 years: 25-50 mg.    3.  Famotidine:  Adults 40 mg oral    Adolescents age 12 years and >88 lbs: 40 mg    Children and Adolescents ? 12years of age: Initial: 0.25 mg/kg/dose  every 12 hours (maximum daily dose: 40 mg/day)    Epi/Auvi Q dose may me repeated in 5-15 minutes if adequate resolution of symptoms does not occur    Patient should be observed for at least one hour after final Epi/Auvi Q dose and must be seen by provider. Patients cannot drive themselves if they have received diphenhydramine.

## 2023-09-11 ENCOUNTER — NURSE ONLY (OUTPATIENT)
Dept: ALLERGY | Age: 48
End: 2023-09-11
Payer: COMMERCIAL

## 2023-09-11 VITALS — HEART RATE: 90 BPM | DIASTOLIC BLOOD PRESSURE: 80 MMHG | SYSTOLIC BLOOD PRESSURE: 110 MMHG

## 2023-09-11 DIAGNOSIS — Z91.048 ALLERGY TO MOLD: Primary | ICD-10-CM

## 2023-09-11 DIAGNOSIS — Z51.6 DESENSITIZATION TO ALLERGENS: ICD-10-CM

## 2023-09-11 DIAGNOSIS — J30.1 ALLERGY TO TREES: ICD-10-CM

## 2023-09-11 PROCEDURE — 95117 IMMUNOTHERAPY INJECTIONS: CPT | Performed by: NURSE PRACTITIONER

## 2023-09-11 NOTE — PROGRESS NOTES
After consent obtained/verified, allergy injection given in back of R/L arm(s). VIAL COLOR OF ALL VIALS TODAY IS red 1:1    ALLERGY INJECTION FROM VIAL A GIVEN left  UPPER ARM IN THE AMOUNT OF 0.50 ML    ALLERGY INJECTION FROM VIAL B GIVEN right upper ARM IN THE AMOUNT OF 0.50  ML    ALLERGY INJECTION FROM VIAL C GIVEN rightlower ARM IN THE AMOUNT OF 0.50 ML      Documentation of vial injection specific to arm(s) noted on Allergy Immunotherapy Administration Form. Patient waited 30 minutes for observation. No      Patient tolerated well without adverse reaction WHILE IN OFFICE    SHOT REACTION TREATMENT INSTRUCTIONS    During the 30 minute wait after an allergy injection the following symptoms should be reported:    Itching other than at the injection site  Hives or swelling other than at the injection site  Redness other than at the injection site  Difficulty breathing  Chest tightness  Difficulty swallowing  Throat tightness    If these symptoms occur, NOTIFY PROVIDER and the following treatment should be administered:    1. Epinephrine/Auvi Q 1:1000 IM - 0.3 ml if > 66 lbs or more, 0.15 ml if 33 - 63 lbs, or 0.1 ml if <33 lbs     2. Diphenhydramine - give all intramuscular:     2 to <6 years (off-label use): 6.25 mg,    6 to <12 years: 12.5 to 25 mg;    ?12 years: 25-50 mg.    3.  Famotidine:  Adults 40 mg oral    Adolescents age 12 years and >88 lbs: 40 mg    Children and Adolescents ? 12years of age: Initial: 0.25 mg/kg/dose  every 12 hours (maximum daily dose: 40 mg/day)    Epi/Auvi Q dose may me repeated in 5-15 minutes if adequate resolution of symptoms does not occur    Patient should be observed for at least one hour after final Epi/Auvi Q dose and must be seen by provider. Patients cannot drive themselves if they have received diphenhydramine.

## 2023-09-12 NOTE — TELEPHONE ENCOUNTER
INR is therapeutic at 2.5.  Lab collected on yesterday.  Unable to reach patient by phone - V/M and portal message sent.  Continue warfarin 5 mg every Monday, Wednesday; and 7.5 mg on all other days as directed.  Will recheck INR in 2 weeks - 9/25/2023 ([facility] lab).       MOM to return call to office

## 2023-09-26 ENCOUNTER — TELEPHONE (OUTPATIENT)
Dept: FAMILY MEDICINE CLINIC | Age: 48
End: 2023-09-26

## 2023-09-26 NOTE — TELEPHONE ENCOUNTER
We received a request via fax from [address] [place] [address]. Request scanned into [facility].

## 2023-09-28 ENCOUNTER — NURSE ONLY (OUTPATIENT)
Dept: ALLERGY | Age: 48
End: 2023-09-28
Payer: COMMERCIAL

## 2023-09-28 VITALS
SYSTOLIC BLOOD PRESSURE: 112 MMHG | OXYGEN SATURATION: 98 % | HEART RATE: 78 BPM | DIASTOLIC BLOOD PRESSURE: 80 MMHG | RESPIRATION RATE: 16 BRPM

## 2023-09-28 DIAGNOSIS — J30.1 ALLERGY TO TREES: ICD-10-CM

## 2023-09-28 DIAGNOSIS — Z51.6 DESENSITIZATION TO ALLERGENS: ICD-10-CM

## 2023-09-28 DIAGNOSIS — Z91.048 ALLERGY TO MOLD: Primary | ICD-10-CM

## 2023-09-28 PROCEDURE — 95117 IMMUNOTHERAPY INJECTIONS: CPT | Performed by: NURSE PRACTITIONER

## 2023-10-03 DIAGNOSIS — J33.9 NASAL POLYPS: ICD-10-CM

## 2023-10-04 RX ORDER — MONTELUKAST SODIUM 10 MG/1
10 TABLET ORAL NIGHTLY
Qty: 90 TABLET | Refills: 1 | Status: SHIPPED | OUTPATIENT
Start: 2023-10-04

## 2023-10-05 ENCOUNTER — NURSE ONLY (OUTPATIENT)
Dept: ALLERGY | Age: 48
End: 2023-10-05
Payer: COMMERCIAL

## 2023-10-05 VITALS
DIASTOLIC BLOOD PRESSURE: 78 MMHG | TEMPERATURE: 97.2 F | OXYGEN SATURATION: 96 % | SYSTOLIC BLOOD PRESSURE: 122 MMHG | RESPIRATION RATE: 16 BRPM | HEART RATE: 96 BPM

## 2023-10-05 DIAGNOSIS — Z91.048 ALLERGY TO MOLD: ICD-10-CM

## 2023-10-05 DIAGNOSIS — Z51.6 DESENSITIZATION TO ALLERGENS: Primary | ICD-10-CM

## 2023-10-05 DIAGNOSIS — J30.1 ALLERGY TO TREES: ICD-10-CM

## 2023-10-05 PROCEDURE — 95117 IMMUNOTHERAPY INJECTIONS: CPT | Performed by: NURSE PRACTITIONER

## 2023-10-10 ENCOUNTER — TELEPHONE (OUTPATIENT)
Dept: FAMILY MEDICINE CLINIC | Age: 48
End: 2023-10-10

## 2023-10-10 RX ORDER — AZITHROMYCIN 250 MG/1
TABLET, FILM COATED ORAL
Qty: 1 PACKET | Refills: 0 | Status: SHIPPED | OUTPATIENT
Start: 2023-10-10

## 2023-10-10 NOTE — TELEPHONE ENCOUNTER
Patient called C/O head congestion, some nasal congestion and iritating throat & chest.      Didn't want to schedule an appt.     Req RX to [facility]    Please call her 21 [phone]

## 2023-10-10 NOTE — TELEPHONE ENCOUNTER
Date of last visit:  8/23/2023  Date of next visit:  2/26/2024    Requested Prescriptions     Signed Prescriptions Disp Refills    azithromycin (ZITHROMAX) 250 MG tablet 1 packet 0     Sig: Take 2 tabs (500 mg) on Day 1, and take 1 tab (250 mg) on days 2 through 5. Authorizing Provider: Roshan March     Ordering User: Farrukh Bhardwaj informed by Phone.

## 2023-10-17 ENCOUNTER — NURSE ONLY (OUTPATIENT)
Dept: ALLERGY | Age: 48
End: 2023-10-17
Payer: COMMERCIAL

## 2023-10-17 VITALS
RESPIRATION RATE: 16 BRPM | OXYGEN SATURATION: 98 % | DIASTOLIC BLOOD PRESSURE: 68 MMHG | SYSTOLIC BLOOD PRESSURE: 124 MMHG | HEART RATE: 85 BPM

## 2023-10-17 DIAGNOSIS — J30.1 ALLERGY TO TREES: ICD-10-CM

## 2023-10-17 DIAGNOSIS — Z51.6 DESENSITIZATION TO ALLERGENS: Primary | ICD-10-CM

## 2023-10-17 DIAGNOSIS — Z91.048 ALLERGY TO MOLD: ICD-10-CM

## 2023-10-17 PROCEDURE — 95117 IMMUNOTHERAPY INJECTIONS: CPT | Performed by: NURSE PRACTITIONER

## 2023-10-24 ENCOUNTER — OFFICE VISIT (OUTPATIENT)
Dept: ENT CLINIC | Age: 48
End: 2023-10-24
Payer: COMMERCIAL

## 2023-10-24 ENCOUNTER — NURSE ONLY (OUTPATIENT)
Dept: ALLERGY | Age: 48
End: 2023-10-24
Payer: COMMERCIAL

## 2023-10-24 VITALS
SYSTOLIC BLOOD PRESSURE: 118 MMHG | RESPIRATION RATE: 16 BRPM | DIASTOLIC BLOOD PRESSURE: 74 MMHG | OXYGEN SATURATION: 99 % | HEART RATE: 77 BPM

## 2023-10-24 VITALS
RESPIRATION RATE: 18 BRPM | SYSTOLIC BLOOD PRESSURE: 122 MMHG | HEART RATE: 76 BPM | DIASTOLIC BLOOD PRESSURE: 74 MMHG | BODY MASS INDEX: 33.73 KG/M2 | HEIGHT: 63 IN | TEMPERATURE: 97.9 F | WEIGHT: 190.4 LBS | OXYGEN SATURATION: 98 %

## 2023-10-24 DIAGNOSIS — Z51.6 DESENSITIZATION TO ALLERGENS: ICD-10-CM

## 2023-10-24 DIAGNOSIS — J30.1 ALLERGY TO TREES: Primary | ICD-10-CM

## 2023-10-24 DIAGNOSIS — J30.2 SEASONAL ALLERGIES: ICD-10-CM

## 2023-10-24 DIAGNOSIS — J32.9 CHRONIC RHINOSINUSITIS: Primary | ICD-10-CM

## 2023-10-24 DIAGNOSIS — Z91.048 ALLERGY TO MOLD: ICD-10-CM

## 2023-10-24 DIAGNOSIS — R09.81 NASAL CONGESTION: ICD-10-CM

## 2023-10-24 PROCEDURE — 95117 IMMUNOTHERAPY INJECTIONS: CPT | Performed by: NURSE PRACTITIONER

## 2023-10-24 PROCEDURE — 99214 OFFICE O/P EST MOD 30 MIN: CPT | Performed by: OTOLARYNGOLOGY

## 2023-10-24 NOTE — PROGRESS NOTES
[facility] [place] [facility]  [facility]  [address]  Dept: [phone]  Dept Fax: [phone]  Loc: [phone]    Lashae Singh is a 51 y.o. female who was referred by No ref. provider found for:  Chief Complaint   Patient presents with    Follow-up     Patient here for a 6 month f/u.patient states she is doing well. HPI:     Lashae Singh is a 51 y.o. female who returns after a substantial interval having had a history of needing multiple endoscopic sinusotomy procedures to bring under control severe chronic rhinosinusitis. At one point it appeared that the patient had maxillary osteomyelitis but fortunately that either came under control or was not yet her diagnosis. She returns today reporting having done very well over this multi month interval with only postnasal drip and occasional coryza  being her problem. The patient has decreased the frequency with which she irrigates her nose and is no longer using hypertonic saline except as needed. She continues to use fluticasone with the [facility] but is considering on decreasing it down to 1/day. She is still getting antiallergy shots. History:      Allergies   Allergen Reactions    [place] [Macadamia Nut Oil]      Almonds per allergy testing    Seasonal      Current Outpatient Medications   Medication Sig Dispense Refill    montelukast (SINGULAIR) 10 MG tablet Take 1 tablet by mouth nightly 90 tablet 1    pantoprazole (PROTONIX) 40 MG tablet Take 1 tablet by mouth every morning (before breakfast) 90 tablet 0    dupilumab (DUPIXENT) 300 MG/2ML SOSY injection Inject 2 mLs into the skin every 14 days 2 each 11    EPINEPHrine (EPIPEN 2-KAYE) 0.3 MG/0.3ML SOAJ injection Inject one pen as directed STAT for allergic reaction, may disp generic NDC 22530-337-96 1 each 1    albuterol (PROVENTIL) (2.5 MG/3ML) 0.083% nebulizer solution Take 3 mLs by nebulization 4

## 2023-11-03 ENCOUNTER — NURSE ONLY (OUTPATIENT)
Dept: ALLERGY | Age: 48
End: 2023-11-03

## 2023-11-03 VITALS
OXYGEN SATURATION: 98 % | DIASTOLIC BLOOD PRESSURE: 82 MMHG | SYSTOLIC BLOOD PRESSURE: 120 MMHG | RESPIRATION RATE: 18 BRPM | HEART RATE: 78 BPM

## 2023-11-03 DIAGNOSIS — Z51.6 DESENSITIZATION TO ALLERGENS: Primary | ICD-10-CM

## 2023-11-03 DIAGNOSIS — Z91.048 ALLERGY TO MOLD: ICD-10-CM

## 2023-11-03 DIAGNOSIS — J30.1 ALLERGY TO TREES: ICD-10-CM

## 2023-11-03 NOTE — PROGRESS NOTES
After consent obtained/verified, allergy injection given in back of R/L arm(s). VIAL COLOR OF ALL VIALS TODAY IS red 1:1    ALLERGY INJECTION FROM VIAL A GIVEN right  UPPER ARM IN THE AMOUNT OF 0.50 ML    ALLERGY INJECTION FROM VIAL B GIVEN left upper ARM IN THE AMOUNT OF 0.50  ML    ALLERGY INJECTION FROM VIAL C GIVEN leftlower ARM IN THE AMOUNT OF 0.50 ML      Documentation of vial injection specific to arm(s) noted on Allergy Immunotherapy Administration Form. Patient waited 30 minutes for observation. No      Patient tolerated well without adverse reaction WHILE IN OFFICE    SHOT REACTION TREATMENT INSTRUCTIONS    During the 30 minute wait after an allergy injection the following symptoms should be reported:    Itching other than at the injection site  Hives or swelling other than at the injection site  Redness other than at the injection site  Difficulty breathing  Chest tightness  Difficulty swallowing  Throat tightness    If these symptoms occur, NOTIFY PROVIDER and the following treatment should be administered:    1. Epinephrine/Auvi Q 1:1000 IM - 0.3 ml if > 66 lbs or more, 0.15 ml if 33 - 63 lbs, or 0.1 ml if <33 lbs     2. Diphenhydramine - give all intramuscular:     2 to <6 years (off-label use): 6.25 mg,    6 to <12 years: 12.5 to 25 mg;    ?12 years: 25-50 mg.    3.  Famotidine:  Adults 40 mg oral    Adolescents age 12 years and >88 lbs: 40 mg    Children and Adolescents ? 12years of age: Initial: 0.25 mg/kg/dose  every 12 hours (maximum daily dose: 40 mg/day)    Epi/Auvi Q dose may me repeated in 5-15 minutes if adequate resolution of symptoms does not occur    Patient should be observed for at least one hour after final Epi/Auvi Q dose and must be seen by provider. Patients cannot drive themselves if they have received diphenhydramine.

## 2023-11-06 ENCOUNTER — NURSE ONLY (OUTPATIENT)
Dept: FAMILY MEDICINE CLINIC | Age: 48
End: 2023-11-06

## 2023-11-06 DIAGNOSIS — Z23 NEED FOR INFLUENZA VACCINATION: Primary | ICD-10-CM

## 2023-11-06 PROCEDURE — 90688 IIV4 VACCINE SPLT 0.5 ML IM: CPT | Performed by: FAMILY MEDICINE

## 2023-11-06 PROCEDURE — 90471 IMMUNIZATION ADMIN: CPT | Performed by: FAMILY MEDICINE

## 2023-11-06 NOTE — PROGRESS NOTES
Immunizations Administered       Name Date Dose Route    Influenza, AFLURIA (age 1 yrs+), FLUZONE, (age 10 mo+), MDV, 0.5mL 11/6/2023 0.5 mL Intramuscular    Site: Deltoid- Left    Lot: [id number]    NDC: 21794-662-12

## 2023-11-07 ENCOUNTER — NURSE ONLY (OUTPATIENT)
Dept: LAB | Age: 48
End: 2023-11-07

## 2023-11-10 ENCOUNTER — NURSE ONLY (OUTPATIENT)
Dept: ALLERGY | Age: 48
End: 2023-11-10

## 2023-11-10 DIAGNOSIS — Z51.6 DESENSITIZATION TO ALLERGENS: Primary | ICD-10-CM

## 2023-11-10 DIAGNOSIS — Z91.048 ALLERGY TO MOLD: ICD-10-CM

## 2023-11-10 DIAGNOSIS — J30.1 ALLERGY TO TREES: ICD-10-CM

## 2023-11-11 DIAGNOSIS — K21.9 GASTROESOPHAGEAL REFLUX DISEASE WITHOUT ESOPHAGITIS: ICD-10-CM

## 2023-11-12 LAB — CYTOLOGY THIN PREP PAP: NORMAL

## 2023-11-13 NOTE — TELEPHONE ENCOUNTER
Date of last visit:  8/23/2023  Date of next visit:  2/26/2024    Requested Prescriptions     Pending Prescriptions Disp Refills    pantoprazole (PROTONIX) 40 MG tablet [Pharmacy Med Name: Pantoprazole Sodium 40 MG Oral Tablet Delayed Release] 90 tablet 1     Sig: TAKE 1 TABLET BY MOUTH ONCE DAILY IN THE MORNING BEFORE BREAKFAST

## 2023-11-14 RX ORDER — PANTOPRAZOLE SODIUM 40 MG/1
TABLET, DELAYED RELEASE ORAL
Qty: 90 TABLET | Refills: 1 | Status: SHIPPED | OUTPATIENT
Start: 2023-11-14

## 2023-11-17 ENCOUNTER — NURSE ONLY (OUTPATIENT)
Dept: ALLERGY | Age: 48
End: 2023-11-17

## 2023-11-17 VITALS
RESPIRATION RATE: 18 BRPM | HEART RATE: 82 BPM | DIASTOLIC BLOOD PRESSURE: 74 MMHG | OXYGEN SATURATION: 98 % | SYSTOLIC BLOOD PRESSURE: 124 MMHG

## 2023-11-17 DIAGNOSIS — Z51.6 DESENSITIZATION TO ALLERGENS: Primary | ICD-10-CM

## 2023-11-17 DIAGNOSIS — Z91.048 ALLERGY TO MOLD: ICD-10-CM

## 2023-11-17 DIAGNOSIS — J30.1 ALLERGY TO TREES: ICD-10-CM

## 2023-12-06 DIAGNOSIS — Z29.8 IMMUNOTHERAPY: ICD-10-CM

## 2023-12-06 DIAGNOSIS — J30.9 CHRONIC ALLERGIC RHINITIS: Primary | ICD-10-CM

## 2023-12-06 PROCEDURE — 95165 ANTIGEN THERAPY SERVICES: CPT | Performed by: NURSE PRACTITIONER

## 2023-12-06 NOTE — PROGRESS NOTES
Late 61+ Days Late   Twice Weekly Repeat last dose Reduce last dose . 2 Reduce last dose . 4 Dilute back 1 vial, start at .05 Patient must start over     Weekly Repeat last dose Reduce last dose . 2 Reduce last dose . 4 Dilute back 1 vial, start at .05 Patient must start over   Every 2 Weeks Repeat last dose Reduce last dose . 2. Reduce last dose . 4 Ask provider for instruction Ask provider for instruction   Every 3 Weeks Repeat last dose Reduce last dose . 2 Reduce last dose . 4 Ask provider for instruction Ask provider for instruction   Every 4 Weeks Repeat last dose Reduce last dose . 2 Reduce last dose . 4 Ask provider for instruction Ask provider for instruction         Patient needs allergy serum vials which were mixed. It is medically necessary to mix patient's vials from a one-to-one concentration at other vials diluted to a 1:10, 1-100, 1:1000, and 1:10,000 concentration for appropriate dosage of the patient. As a result the patient will be billed for all total serums that were mixed. This ensures that the patient will not run out of serum and will receive appropriate care. Patient needs allergy serum vials which were mixed. It is medically necessary to mix patient's vials from a one-to-one concentration at other vials diluted to a 1:10, 1-100, 1:1000, and 1:10,000 concentration for appropriate dosage of the patient. As a result the patient will be billed for all total serums that were mixed. This ensures that the patient will not run out of serum and will receive appropriate care.   I personally approved the serum mixing and was present in the office during the mixing process     NU Swain personally approved the serum mixing and was present in the office during the mixing process

## 2023-12-07 ENCOUNTER — NURSE ONLY (OUTPATIENT)
Dept: ALLERGY | Age: 48
End: 2023-12-07
Payer: COMMERCIAL

## 2023-12-07 VITALS
SYSTOLIC BLOOD PRESSURE: 118 MMHG | DIASTOLIC BLOOD PRESSURE: 82 MMHG | RESPIRATION RATE: 16 BRPM | OXYGEN SATURATION: 97 % | HEART RATE: 72 BPM

## 2023-12-07 DIAGNOSIS — Z51.6 DESENSITIZATION TO ALLERGENS: Primary | ICD-10-CM

## 2023-12-07 DIAGNOSIS — Z91.048 ALLERGY TO MOLD: ICD-10-CM

## 2023-12-07 DIAGNOSIS — J30.1 ALLERGY TO TREES: ICD-10-CM

## 2023-12-07 PROCEDURE — 95117 IMMUNOTHERAPY INJECTIONS: CPT | Performed by: NURSE PRACTITIONER

## 2023-12-07 NOTE — PROGRESS NOTES
After consent obtained/verified, allergy injection given in back of R/L arm(s). VIAL COLOR OF ALL VIALS TODAY IS red 1:1    ALLERGY INJECTION FROM VIAL A GIVEN left  UPPER ARM IN THE AMOUNT OF 0.35 ML    ALLERGY INJECTION FROM VIAL B GIVEN right upper ARM IN THE AMOUNT OF 0.35  ML    ALLERGY INJECTION FROM VIAL C GIVEN rightlower ARM IN THE AMOUNT OF 0.35 ML      Documentation of vial injection specific to arm(s) noted on Allergy Immunotherapy Administration Form. Patient waited 30 minutes for observation. No      Patient tolerated well without adverse reaction WHILE IN OFFICE    SHOT REACTION TREATMENT INSTRUCTIONS    During the 30 minute wait after an allergy injection the following symptoms should be reported:    Itching other than at the injection site  Hives or swelling other than at the injection site  Redness other than at the injection site  Difficulty breathing  Chest tightness  Difficulty swallowing  Throat tightness    If these symptoms occur, NOTIFY PROVIDER and the following treatment should be administered:    1. Epinephrine/Auvi Q 1:1000 IM - 0.3 ml if > 66 lbs or more, 0.15 ml if 33 - 63 lbs, or 0.1 ml if <33 lbs     2. Diphenhydramine - give all intramuscular:     2 to <6 years (off-label use): 6.25 mg,    6 to <12 years: 12.5 to 25 mg;    ?12 years: 25-50 mg.    3.  Famotidine:  Adults 40 mg oral    Adolescents age 12 years and >88 lbs: 40 mg    Children and Adolescents ? 12years of age: Initial: 0.25 mg/kg/dose  every 12 hours (maximum daily dose: 40 mg/day)    Epi/Auvi Q dose may me repeated in 5-15 minutes if adequate resolution of symptoms does not occur    Patient should be observed for at least one hour after final Epi/Auvi Q dose and must be seen by provider. Patients cannot drive themselves if they have received diphenhydramine.

## 2023-12-11 ENCOUNTER — NURSE ONLY (OUTPATIENT)
Dept: ALLERGY | Age: 48
End: 2023-12-11
Payer: COMMERCIAL

## 2023-12-11 VITALS
DIASTOLIC BLOOD PRESSURE: 89 MMHG | RESPIRATION RATE: 18 BRPM | OXYGEN SATURATION: 97 % | HEART RATE: 88 BPM | SYSTOLIC BLOOD PRESSURE: 131 MMHG

## 2023-12-11 DIAGNOSIS — Z91.048 ALLERGY TO MOLD: ICD-10-CM

## 2023-12-11 DIAGNOSIS — Z51.6 DESENSITIZATION TO ALLERGENS: Primary | ICD-10-CM

## 2023-12-11 DIAGNOSIS — J30.1 ALLERGY TO TREES: ICD-10-CM

## 2023-12-11 PROCEDURE — 95117 IMMUNOTHERAPY INJECTIONS: CPT | Performed by: NURSE PRACTITIONER

## 2023-12-11 NOTE — PROGRESS NOTES
After consent obtained/verified, allergy injection given in back of R/L arm(s). VIAL COLOR OF ALL VIALS TODAY IS red 1:1    ALLERGY INJECTION FROM VIAL A GIVEN right  UPPER ARM IN THE AMOUNT OF 0.40 ML    ALLERGY INJECTION FROM VIAL B GIVEN left upper ARM IN THE AMOUNT OF 0.40  ML    ALLERGY INJECTION FROM VIAL C GIVEN leftlower ARM IN THE AMOUNT OF 0.40 ML      Documentation of vial injection specific to arm(s) noted on Allergy Immunotherapy Administration Form. Patient waited 30 minutes for observation. No      Patient tolerated well without adverse reaction WHILE IN OFFICE    SHOT REACTION TREATMENT INSTRUCTIONS    During the 30 minute wait after an allergy injection the following symptoms should be reported:    Itching other than at the injection site  Hives or swelling other than at the injection site  Redness other than at the injection site  Difficulty breathing  Chest tightness  Difficulty swallowing  Throat tightness    If these symptoms occur, NOTIFY PROVIDER and the following treatment should be administered:    1. Epinephrine/Auvi Q 1:1000 IM - 0.3 ml if > 66 lbs or more, 0.15 ml if 33 - 63 lbs, or 0.1 ml if <33 lbs     2. Diphenhydramine - give all intramuscular:     2 to <6 years (off-label use): 6.25 mg,    6 to <12 years: 12.5 to 25 mg;    ?12 years: 25-50 mg.    3.  Famotidine:  Adults 40 mg oral    Adolescents age 12 years and >88 lbs: 40 mg    Children and Adolescents ? 12years of age: Initial: 0.25 mg/kg/dose  every 12 hours (maximum daily dose: 40 mg/day)    Epi/Auvi Q dose may me repeated in 5-15 minutes if adequate resolution of symptoms does not occur    Patient should be observed for at least one hour after final Epi/Auvi Q dose and must be seen by provider. Patients cannot drive themselves if they have received diphenhydramine.

## 2023-12-28 ENCOUNTER — NURSE ONLY (OUTPATIENT)
Dept: ALLERGY | Age: 48
End: 2023-12-28
Payer: COMMERCIAL

## 2023-12-28 VITALS
DIASTOLIC BLOOD PRESSURE: 72 MMHG | SYSTOLIC BLOOD PRESSURE: 116 MMHG | OXYGEN SATURATION: 98 % | HEART RATE: 83 BPM | RESPIRATION RATE: 16 BRPM

## 2023-12-28 DIAGNOSIS — Z51.6 DESENSITIZATION TO ALLERGENS: Primary | ICD-10-CM

## 2023-12-28 DIAGNOSIS — J30.1 ALLERGY TO TREES: ICD-10-CM

## 2023-12-28 DIAGNOSIS — Z91.048 ALLERGY TO MOLD: ICD-10-CM

## 2023-12-28 PROCEDURE — 95117 IMMUNOTHERAPY INJECTIONS: CPT | Performed by: NURSE PRACTITIONER

## 2024-01-09 DIAGNOSIS — J30.9 CHRONIC ALLERGIC RHINITIS: Primary | ICD-10-CM

## 2024-01-09 DIAGNOSIS — Z29.8 IMMUNOTHERAPY: ICD-10-CM

## 2024-01-09 NOTE — PROGRESS NOTES
ALLERGY EXTRACT MIXING.  MONTHLY BILLING    DATE OF MIXING= 12/6/2023  TOTAL NUMBER OF SET OF VIALS= 3  TOTAL VIALS PREPARED = 3  TOTAL INJECTABLE DOSES =   20 INJECTIONS PER SET  TOTAL ANTICIPATED DOSES = 60 INJECTIONS   TOTAL NUMBER OF INJECTIONS BILLED TODAY = 30     ALLERGY IMMUNOTHERAPY DOSES IS BILLED AT NO MORE THAN 30 UNITS PER MONTH.  TOTAL DOSES BILLED IS AT 30 DOSES PER MONTH UNTIL ALL ANTICIPATED DOSES ARE BILLED.     An allergy extract was prepared according to written prescription by provider.  Provider onsite during allergy extract preparation. Patient vial(s) include the following:     RED VIAL - 1:1 CONCENTRATION - EXPIRES 12 MONTHS  YELLOW VIAL-1:10 CONCENTRATION - EXPIRES 12 MONTHS  BLUE VIAL-1:100 CONCENTRATION - EXPIRES 12 MONTHS  GREEN VIAL-1:1,000 CONCENTRATION - EXPIRES 6 MONTHS  SILVER VIAL-1:10,000 CONCENTRATION - EXPIRES 6 MONTHS    Allergy extract was prepared by the following method:   RED TO YELLOW:  Once the  one-to-one concentration was prepared in the red vial, 0.5 ML's was then extracted in place into the yellow vial to achieve a 1:10 concentration.    YELLOW TO BLUE:  Then 0.5 ML's was extracted from the yellow vial and placed into the blue file with dilutant to achieve a 1:100 concentration.    BLUE TO GREEN:  Then 0.5 ML's was extracted from the blue vial and placed into Green vial with dilute to achieve a 1:1,000 concentration.    GREEN TO SILVER:  Then 0.5 ML's was taken from the green vial and placed in the Silver vial with dilutant to achieve a 1:10,000 concentration.      Patient vials were labeled with name, date-of-birth, vial (A,B,or C), concentration, and expiration.    When injecting from a new  vial the first injections is 0.05 ml and are increased by 0.05 increments until 0.5ml from the vial is achieved or the patient reaches maximum tolerated dose.   Injections are given once ot three times weekly and at least 24 hours apart, according to provider orders.   If

## 2024-01-16 ENCOUNTER — NURSE ONLY (OUTPATIENT)
Dept: ALLERGY | Age: 49
End: 2024-01-16
Payer: COMMERCIAL

## 2024-01-16 VITALS
DIASTOLIC BLOOD PRESSURE: 76 MMHG | OXYGEN SATURATION: 98 % | HEART RATE: 76 BPM | RESPIRATION RATE: 16 BRPM | SYSTOLIC BLOOD PRESSURE: 124 MMHG

## 2024-01-16 DIAGNOSIS — J30.1 ALLERGY TO TREES: ICD-10-CM

## 2024-01-16 DIAGNOSIS — J30.81 ALLERGY TO CATS: ICD-10-CM

## 2024-01-16 DIAGNOSIS — J30.1 ALLERGIC RHINITIS DUE TO POLLEN, UNSPECIFIED SEASONALITY: Primary | ICD-10-CM

## 2024-01-16 PROCEDURE — 95117 IMMUNOTHERAPY INJECTIONS: CPT | Performed by: NURSE PRACTITIONER

## 2024-01-16 NOTE — PROGRESS NOTES

## 2024-01-19 ENCOUNTER — HOSPITAL ENCOUNTER (OUTPATIENT)
Dept: MAMMOGRAPHY | Age: 49
Discharge: HOME | End: 2024-01-19
Payer: COMMERCIAL

## 2024-01-19 DIAGNOSIS — Z12.31 VISIT FOR SCREENING MAMMOGRAM: ICD-10-CM

## 2024-01-19 PROCEDURE — 77063 BREAST TOMOSYNTHESIS BI: CPT

## 2024-01-23 ENCOUNTER — NURSE ONLY (OUTPATIENT)
Dept: ALLERGY | Age: 49
End: 2024-01-23
Payer: COMMERCIAL

## 2024-01-23 VITALS
DIASTOLIC BLOOD PRESSURE: 81 MMHG | SYSTOLIC BLOOD PRESSURE: 120 MMHG | HEART RATE: 87 BPM | RESPIRATION RATE: 18 BRPM | OXYGEN SATURATION: 100 %

## 2024-01-23 DIAGNOSIS — J30.1 SEASONAL ALLERGIC RHINITIS DUE TO POLLEN: ICD-10-CM

## 2024-01-23 DIAGNOSIS — Z91.09 OTHER ALLERGY, OTHER THAN TO MEDICINAL AGENTS: ICD-10-CM

## 2024-01-23 DIAGNOSIS — J30.81 ALLERGIC RHINITIS DUE TO ANIMAL HAIR AND DANDER: Primary | ICD-10-CM

## 2024-01-23 PROCEDURE — 95117 IMMUNOTHERAPY INJECTIONS: CPT | Performed by: NURSE PRACTITIONER

## 2024-01-23 NOTE — PROGRESS NOTES
PATIENT HAS THE FOLLOWING DIAGNOSIS SUPPORTING ADMINISTRATION OF ALLERGY INJECTIONS: J30.1Allergic rhinitis due to pollen  AND 39078 MULTIPLE ALLERGY INJECTIONS FOR ALLERGY INJECTION ADMINISTRATION      Patient here for allergy injection today.  Patient was presented with the opportunity to speak with provider and ask questions regarding allergy injections.  Patient was also instructed they need to wait 30 minutes after receiving allergy injections. All risks associated with potential adverse effects have been explained to patient and patient handout was provided following allergy testing.    After consent obtained/verified, allergy injection subcutaneously given in back of arm(s).  Please see below for specific details of site injections, concentration of serum, and volume injected.    VIAL COLOR OF ALL VIALS TODAY IS red 1:1. Vial allergy w/v concentration today is: 1:1     ALLERGY INJECTION FROM VIAL A GIVEN right  UPPER ARM IN THE AMOUNT OF 0.50 ML    ALLERGY INJECTION FROM VIAL B GIVEN left upper ARM IN THE AMOUNT OF 0.50  ML    ALLERGY INJECTION FROM VIAL C GIVEN leftlower ARM IN THE AMOUNT OF 0.50 ML      Documentation of vial injection specific to arm(s) noted on Allergy Immunotherapy Administration Form.       Patient waited 30 minutes for observation.No  Patient has received information and verbalizes understanding of the potential  health risks associated with allergy injections . Patient understands risks including anaphylaxis and chooses not to wait 30 minutes after administration of allergy injection(s).    Patient tolerated well without adverse reaction while the patient was in the office.    SHOT REACTION TREATMENT INSTRUCTIONS    During the 30 minute wait after an allergy injection the following symptoms should be reported:    Itching other than at the injection site  Hives or swelling other than at the injection site  Redness other than at the injection site  Difficulty breathing  Chest

## 2024-01-30 ENCOUNTER — NURSE ONLY (OUTPATIENT)
Dept: ALLERGY | Age: 49
End: 2024-01-30
Payer: COMMERCIAL

## 2024-01-30 VITALS — SYSTOLIC BLOOD PRESSURE: 120 MMHG | OXYGEN SATURATION: 99 % | HEART RATE: 88 BPM | DIASTOLIC BLOOD PRESSURE: 80 MMHG

## 2024-01-30 DIAGNOSIS — J30.1 ALLERGIC RHINITIS DUE TO POLLEN, UNSPECIFIED SEASONALITY: ICD-10-CM

## 2024-01-30 DIAGNOSIS — J30.81 ALLERGIC RHINITIS DUE TO ANIMAL HAIR AND DANDER: Primary | ICD-10-CM

## 2024-01-30 DIAGNOSIS — J30.81 ALLERGY TO CATS: ICD-10-CM

## 2024-01-30 PROCEDURE — 95117 IMMUNOTHERAPY INJECTIONS: CPT | Performed by: NURSE PRACTITIONER

## 2024-01-30 NOTE — PROGRESS NOTES
PATIENT HAS THE FOLLOWING DIAGNOSIS SUPPORTING ADMINISTRATION OF ALLERGY INJECTIONS: J30.1Allergic rhinitis due to pollen  AND 71001 MULTIPLE ALLERGY INJECTIONS FOR ALLERGY INJECTION ADMINISTRATION      Patient here for allergy injection today.  Patient was presented with the opportunity to speak with provider and ask questions regarding allergy injections.  Patient was also instructed they need to wait 30 minutes after receiving allergy injections. All risks associated with potential adverse effects have been explained to patient and patient handout was provided following allergy testing.    After consent obtained/verified, allergy injection subcutaneously given in back of arm(s).  Please see below for specific details of site injections, concentration of serum, and volume injected.    VIAL COLOR OF ALL VIALS TODAY IS red 1:1. Vial allergy w/v concentration today is: 1:1     ALLERGY INJECTION FROM VIAL A GIVEN left  UPPER ARM IN THE AMOUNT OF 0.50 ML    ALLERGY INJECTION FROM VIAL B GIVEN right lower ARM IN THE AMOUNT OF 0.50  ML    ALLERGY INJECTION FROM VIAL C GIVEN rightupper ARM IN THE AMOUNT OF 0.50 ML      Documentation of vial injection specific to arm(s) noted on Allergy Immunotherapy Administration Form.       Patient waited 30 minutes for observation.No  Patient has received information and verbalizes understanding of the potential  health risks associated with allergy injections . Patient understands risks including anaphylaxis and chooses not to wait 30 minutes after administration of allergy injection(s).    Patient tolerated well without adverse reaction while the patient was in the office.    SHOT REACTION TREATMENT INSTRUCTIONS    During the 30 minute wait after an allergy injection the following symptoms should be reported:    Itching other than at the injection site  Hives or swelling other than at the injection site  Redness other than at the injection site  Difficulty breathing  Chest

## 2024-02-05 ENCOUNTER — NURSE ONLY (OUTPATIENT)
Dept: ALLERGY | Age: 49
End: 2024-02-05
Payer: COMMERCIAL

## 2024-02-05 VITALS — SYSTOLIC BLOOD PRESSURE: 120 MMHG | HEART RATE: 83 BPM | OXYGEN SATURATION: 97 % | DIASTOLIC BLOOD PRESSURE: 78 MMHG

## 2024-02-05 DIAGNOSIS — J30.1 ALLERGY TO TREES: ICD-10-CM

## 2024-02-05 DIAGNOSIS — J30.1 ALLERGIC RHINITIS DUE TO POLLEN, UNSPECIFIED SEASONALITY: Primary | ICD-10-CM

## 2024-02-05 DIAGNOSIS — J30.81 ALLERGY TO CATS: ICD-10-CM

## 2024-02-05 PROCEDURE — 95117 IMMUNOTHERAPY INJECTIONS: CPT | Performed by: NURSE PRACTITIONER

## 2024-02-05 NOTE — PROGRESS NOTES
PATIENT HAS THE FOLLOWING DIAGNOSIS SUPPORTING ADMINISTRATION OF ALLERGY INJECTIONS: J30.1Allergic rhinitis due to pollen  AND 37814 MULTIPLE ALLERGY INJECTIONS FOR ALLERGY INJECTION ADMINISTRATION      Patient here for allergy injection today.  Patient was presented with the opportunity to speak with provider and ask questions regarding allergy injections.  Patient was also instructed they need to wait 30 minutes after receiving allergy injections. All risks associated with potential adverse effects have been explained to patient and patient handout was provided following allergy testing.    After consent obtained/verified, allergy injection subcutaneously given in back of arm(s).  Please see below for specific details of site injections, concentration of serum, and volume injected.    VIAL COLOR OF ALL VIALS TODAY IS red 1:1. Vial allergy w/v concentration today is: 1:1     ALLERGY INJECTION FROM VIAL A GIVEN right  UPPER ARM IN THE AMOUNT OF 0.50 ML    ALLERGY INJECTION FROM VIAL B GIVEN left upper ARM IN THE AMOUNT OF 0.50  ML    ALLERGY INJECTION FROM VIAL C GIVEN leftlower ARM IN THE AMOUNT OF 0.50 ML      Documentation of vial injection specific to arm(s) noted on Allergy Immunotherapy Administration Form.       Patient waited 30 minutes for observation.No  Patient has received information and verbalizes understanding of the potential  health risks associated with allergy injections . Patient understands risks including anaphylaxis and chooses not to wait 30 minutes after administration of allergy injection(s).    Patient tolerated well without adverse reaction while the patient was in the office.    SHOT REACTION TREATMENT INSTRUCTIONS    During the 30 minute wait after an allergy injection the following symptoms should be reported:    Itching other than at the injection site  Hives or swelling other than at the injection site  Redness other than at the injection site  Difficulty breathing  Chest

## 2024-02-13 ENCOUNTER — NURSE ONLY (OUTPATIENT)
Dept: ALLERGY | Age: 49
End: 2024-02-13
Payer: COMMERCIAL

## 2024-02-13 VITALS — DIASTOLIC BLOOD PRESSURE: 72 MMHG | HEART RATE: 75 BPM | OXYGEN SATURATION: 97 % | SYSTOLIC BLOOD PRESSURE: 120 MMHG

## 2024-02-13 DIAGNOSIS — J30.1 ALLERGY TO TREES: ICD-10-CM

## 2024-02-13 DIAGNOSIS — J30.81 ALLERGY TO CATS: ICD-10-CM

## 2024-02-13 DIAGNOSIS — J30.1 ALLERGIC RHINITIS DUE TO POLLEN, UNSPECIFIED SEASONALITY: Primary | ICD-10-CM

## 2024-02-13 PROCEDURE — 95117 IMMUNOTHERAPY INJECTIONS: CPT | Performed by: NURSE PRACTITIONER

## 2024-02-13 NOTE — PROGRESS NOTES
PATIENT HAS THE FOLLOWING DIAGNOSIS SUPPORTING ADMINISTRATION OF ALLERGY INJECTIONS: J30.1Allergic rhinitis due to pollen  AND 49433 MULTIPLE ALLERGY INJECTIONS FOR ALLERGY INJECTION ADMINISTRATION      Patient here for allergy injection today.  Patient was presented with the opportunity to speak with provider and ask questions regarding allergy injections.  Patient was also instructed they need to wait 30 minutes after receiving allergy injections. All risks associated with potential adverse effects have been explained to patient and patient handout was provided following allergy testing.    After consent obtained/verified, allergy injection subcutaneously given in back of arm(s).  Please see below for specific details of site injections, concentration of serum, and volume injected.    VIAL COLOR OF ALL VIALS TODAY IS red 1:1. Vial allergy w/v concentration today is: 1:1     ALLERGY INJECTION FROM VIAL A GIVEN left  UPPER ARM IN THE AMOUNT OF 0.50 ML    ALLERGY INJECTION FROM VIAL B GIVEN right lower ARM IN THE AMOUNT OF 0.50  ML    ALLERGY INJECTION FROM VIAL C GIVEN rightupper ARM IN THE AMOUNT OF 0.50 ML      Documentation of vial injection specific to arm(s) noted on Allergy Immunotherapy Administration Form.       Patient waited 30 minutes for observation.No  Patient has received information and verbalizes understanding of the potential  health risks associated with allergy injections . Patient understands risks including anaphylaxis and chooses not to wait 30 minutes after administration of allergy injection(s).    Patient tolerated well without adverse reaction while the patient was in the office.    SHOT REACTION TREATMENT INSTRUCTIONS    During the 30 minute wait after an allergy injection the following symptoms should be reported:    Itching other than at the injection site  Hives or swelling other than at the injection site  Redness other than at the injection site  Difficulty breathing  Chest

## 2024-02-20 ENCOUNTER — NURSE ONLY (OUTPATIENT)
Dept: ALLERGY | Age: 49
End: 2024-02-20
Payer: COMMERCIAL

## 2024-02-20 VITALS
DIASTOLIC BLOOD PRESSURE: 89 MMHG | SYSTOLIC BLOOD PRESSURE: 127 MMHG | RESPIRATION RATE: 18 BRPM | HEART RATE: 80 BPM | OXYGEN SATURATION: 98 %

## 2024-02-20 DIAGNOSIS — J33.9 NASAL POLYPS: ICD-10-CM

## 2024-02-20 DIAGNOSIS — J30.1 SEASONAL ALLERGIC RHINITIS DUE TO POLLEN: ICD-10-CM

## 2024-02-20 DIAGNOSIS — J30.81 ALLERGIC RHINITIS DUE TO ANIMAL HAIR AND DANDER: Primary | ICD-10-CM

## 2024-02-20 DIAGNOSIS — Z91.09 OTHER ALLERGY, OTHER THAN TO MEDICINAL AGENTS: ICD-10-CM

## 2024-02-20 PROCEDURE — 95117 IMMUNOTHERAPY INJECTIONS: CPT | Performed by: NURSE PRACTITIONER

## 2024-02-20 RX ORDER — DUPILUMAB 300 MG/2ML
INJECTION, SOLUTION SUBCUTANEOUS
Qty: 4 ML | OUTPATIENT
Start: 2024-02-20

## 2024-02-20 NOTE — TELEPHONE ENCOUNTER
Pt asking for refill of dupixent. Pt states she will be 1 shot short based on when she sees you next and would like x1 refill.        Pharmacy (need the exact pharmacy):     Patient last received medication on date: 3/29/23  Number of refills given at last fill: 11  (If refills are current the patient does not need another refill)    Last appointment: 3/29/23    Next appt : 4/1/24    Was patient was a no show for last appt?:  no

## 2024-02-20 NOTE — PROGRESS NOTES
PATIENT HAS THE FOLLOWING DIAGNOSIS SUPPORTING ADMINISTRATION OF ALLERGY INJECTIONS: J30.1Allergic rhinitis due to pollen  AND 05643 MULTIPLE ALLERGY INJECTIONS FOR ALLERGY INJECTION ADMINISTRATION      Patient here for allergy injection today.  Patient was presented with the opportunity to speak with provider and ask questions regarding allergy injections.  Patient was also instructed they need to wait 30 minutes after receiving allergy injections. All risks associated with potential adverse effects have been explained to patient and patient handout was provided following allergy testing.    After consent obtained/verified, allergy injection subcutaneously given in back of arm(s).  Please see below for specific details of site injections, concentration of serum, and volume injected.    VIAL COLOR OF ALL VIALS TODAY IS red 1:1. Vial allergy w/v concentration today is: 1:1     ALLERGY INJECTION FROM VIAL A GIVEN right  UPPER ARM IN THE AMOUNT OF 0.50 ML    ALLERGY INJECTION FROM VIAL B GIVEN left upper ARM IN THE AMOUNT OF 0.50  ML    ALLERGY INJECTION FROM VIAL C GIVEN leftlower ARM IN THE AMOUNT OF 0.50 ML      Documentation of vial injection specific to arm(s) noted on Allergy Immunotherapy Administration Form.       Patient waited 30 minutes for observation.No  Patient has received information and verbalizes understanding of the potential  health risks associated with allergy injections . Patient understands risks including anaphylaxis and chooses not to wait 30 minutes after administration of allergy injection(s).    Patient tolerated well without adverse reaction while the patient was in the office.    SHOT REACTION TREATMENT INSTRUCTIONS    During the 30 minute wait after an allergy injection the following symptoms should be reported:    Itching other than at the injection site  Hives or swelling other than at the injection site  Redness other than at the injection site  Difficulty breathing  Chest

## 2024-02-29 ENCOUNTER — NURSE ONLY (OUTPATIENT)
Dept: ALLERGY | Age: 49
End: 2024-02-29
Payer: COMMERCIAL

## 2024-02-29 VITALS — DIASTOLIC BLOOD PRESSURE: 80 MMHG | OXYGEN SATURATION: 95 % | HEART RATE: 81 BPM | SYSTOLIC BLOOD PRESSURE: 121 MMHG

## 2024-02-29 DIAGNOSIS — J30.81 ALLERGIC RHINITIS DUE TO ANIMAL HAIR AND DANDER: ICD-10-CM

## 2024-02-29 DIAGNOSIS — J30.1 SEASONAL ALLERGIC RHINITIS DUE TO POLLEN: ICD-10-CM

## 2024-02-29 DIAGNOSIS — J30.9 CHRONIC ALLERGIC RHINITIS: Primary | ICD-10-CM

## 2024-02-29 PROCEDURE — 95117 IMMUNOTHERAPY INJECTIONS: CPT | Performed by: NURSE PRACTITIONER

## 2024-02-29 RX ORDER — DUPILUMAB 300 MG/2ML
INJECTION, SOLUTION SUBCUTANEOUS
COMMUNITY
Start: 2024-02-20

## 2024-02-29 NOTE — PROGRESS NOTES

## 2024-03-04 ENCOUNTER — OFFICE VISIT (OUTPATIENT)
Dept: FAMILY MEDICINE CLINIC | Age: 49
End: 2024-03-04

## 2024-03-04 VITALS
HEART RATE: 72 BPM | HEIGHT: 63 IN | RESPIRATION RATE: 16 BRPM | SYSTOLIC BLOOD PRESSURE: 120 MMHG | BODY MASS INDEX: 33.13 KG/M2 | WEIGHT: 187 LBS | DIASTOLIC BLOOD PRESSURE: 76 MMHG

## 2024-03-04 DIAGNOSIS — M25.572 LEFT LATERAL ANKLE PAIN: Primary | ICD-10-CM

## 2024-03-04 DIAGNOSIS — K21.9 GASTROESOPHAGEAL REFLUX DISEASE WITHOUT ESOPHAGITIS: ICD-10-CM

## 2024-03-04 DIAGNOSIS — R42 VERTIGO: ICD-10-CM

## 2024-03-04 DIAGNOSIS — R10.32 LEFT GROIN PAIN: ICD-10-CM

## 2024-03-04 PROCEDURE — 99213 OFFICE O/P EST LOW 20 MIN: CPT | Performed by: FAMILY MEDICINE

## 2024-03-04 RX ORDER — MECLIZINE HYDROCHLORIDE 25 MG/1
25 TABLET ORAL 4 TIMES DAILY PRN
Qty: 40 TABLET | Refills: 1 | Status: SHIPPED | OUTPATIENT
Start: 2024-03-04

## 2024-03-04 ASSESSMENT — ENCOUNTER SYMPTOMS
CONSTIPATION: 0
WHEEZING: 0
COUGH: 0
NAUSEA: 0
SHORTNESS OF BREATH: 0
SORE THROAT: 0
ABDOMINAL PAIN: 0
EYE PAIN: 0
CHEST TIGHTNESS: 0

## 2024-03-04 ASSESSMENT — PATIENT HEALTH QUESTIONNAIRE - PHQ9
SUM OF ALL RESPONSES TO PHQ QUESTIONS 1-9: 0
SUM OF ALL RESPONSES TO PHQ9 QUESTIONS 1 & 2: 0
2. FEELING DOWN, DEPRESSED OR HOPELESS: 0
SUM OF ALL RESPONSES TO PHQ QUESTIONS 1-9: 0
1. LITTLE INTEREST OR PLEASURE IN DOING THINGS: 0

## 2024-03-04 NOTE — PROGRESS NOTES
Subjective:      Patient ID: Lashae Coleman is a 48 y.o. female.       Joint  swelling  noted    and  with pain     left  ankle  pain  a nd  with pain    of  ant  talofibular  ligament      Pain  left   groin and  to  the  thigh     Groin Pain  Primary symptoms comment: c-=occurs    with   sitting  and    lying  on left   side. This is a new problem. The problem occurs intermittently. The pain is mild. The problem affects the left side. Pertinent negatives include no abdominal pain, constipation, dysuria, fever, frequency, headaches, nausea, rash or sore throat. The treatment provided mild relief.     Past Medical History:   Diagnosis Date    Allergic rhinitis     Asthma     Chronic maxillary sinusitis 04/05/2021    COVID 01/09/2022    Nasal polyposis     Panic attacks 04/1998    PONV (postoperative nausea and vomiting)     Scopalamine patch    Seasonal allergies 04/1999    Sinusitis, chronic         Review of Systems   Constitutional:  Negative for appetite change, fatigue and fever.   HENT:  Negative for congestion, ear pain, postnasal drip and sore throat.    Eyes:  Negative for pain and visual disturbance.   Respiratory:  Negative for cough, chest tightness, shortness of breath and wheezing.    Cardiovascular:  Negative for chest pain, palpitations and leg swelling.   Gastrointestinal:  Negative for abdominal pain, constipation and nausea.   Genitourinary:  Negative for dysuria and frequency.   Musculoskeletal:  Negative for arthralgias, joint swelling, neck pain and neck stiffness.   Skin:  Negative for rash.   Neurological:  Negative for dizziness, weakness, numbness and headaches.   Hematological:  Negative for adenopathy. Does not bruise/bleed easily.   Psychiatric/Behavioral:  Negative for behavioral problems and sleep disturbance. The patient is not nervous/anxious.        /76 (Site: Right Upper Arm, Position: Sitting, Cuff Size: Medium Adult)   Pulse 72   Resp 16   Ht 1.6 m (5' 3\")   Wt 84.8

## 2024-03-11 ENCOUNTER — NURSE ONLY (OUTPATIENT)
Dept: ALLERGY | Age: 49
End: 2024-03-11
Payer: COMMERCIAL

## 2024-03-11 VITALS
OXYGEN SATURATION: 96 % | DIASTOLIC BLOOD PRESSURE: 84 MMHG | SYSTOLIC BLOOD PRESSURE: 129 MMHG | HEART RATE: 66 BPM | RESPIRATION RATE: 18 BRPM

## 2024-03-11 DIAGNOSIS — J30.9 CHRONIC ALLERGIC RHINITIS: Primary | ICD-10-CM

## 2024-03-11 DIAGNOSIS — J30.1 SEASONAL ALLERGIC RHINITIS DUE TO POLLEN: ICD-10-CM

## 2024-03-11 DIAGNOSIS — J30.81 ALLERGIC RHINITIS DUE TO ANIMAL HAIR AND DANDER: ICD-10-CM

## 2024-03-11 PROCEDURE — 95117 IMMUNOTHERAPY INJECTIONS: CPT | Performed by: NURSE PRACTITIONER

## 2024-03-11 NOTE — PROGRESS NOTES
PATIENT HAS THE FOLLOWING DIAGNOSIS SUPPORTING ADMINISTRATION OF ALLERGY INJECTIONS: J30.1Allergic rhinitis due to pollen  AND 84154 MULTIPLE ALLERGY INJECTIONS FOR ALLERGY INJECTION ADMINISTRATION      Patient here for allergy injection today.  Patient was presented with the opportunity to speak with provider and ask questions regarding allergy injections.  Patient was also instructed they need to wait 30 minutes after receiving allergy injections. All risks associated with potential adverse effects have been explained to patient and patient handout was provided following allergy testing.    After consent obtained/verified, allergy injection subcutaneously given in back of arm(s).  Please see below for specific details of site injections, concentration of serum, and volume injected.    VIAL COLOR OF ALL VIALS TODAY IS red 1:1. Vial allergy w/v concentration today is: 1:1     ALLERGY INJECTION FROM VIAL A GIVEN right  UPPER ARM IN THE AMOUNT OF 0.50 ML    ALLERGY INJECTION FROM VIAL B GIVEN left lower ARM IN THE AMOUNT OF 0.50  ML    ALLERGY INJECTION FROM VIAL C GIVEN leftupper ARM IN THE AMOUNT OF 0.50 ML      Documentation of vial injection specific to arm(s) noted on Allergy Immunotherapy Administration Form.       Patient waited 30 minutes for observation.No  Patient has received information and verbalizes understanding of the potential  health risks associated with allergy injections . Patient understands risks including anaphylaxis and chooses not to wait 30 minutes after administration of allergy injection(s).    Patient tolerated well without adverse reaction while the patient was in the office.    SHOT REACTION TREATMENT INSTRUCTIONS    During the 30 minute wait after an allergy injection the following symptoms should be reported:    Itching other than at the injection site  Hives or swelling other than at the injection site  Redness other than at the injection site  Difficulty breathing  Chest

## 2024-03-12 RX ORDER — DUPILUMAB 300 MG/2ML
INJECTION, SOLUTION SUBCUTANEOUS
Qty: 4 ML | OUTPATIENT
Start: 2024-03-12

## 2024-03-25 ENCOUNTER — NURSE ONLY (OUTPATIENT)
Dept: ALLERGY | Age: 49
End: 2024-03-25
Payer: COMMERCIAL

## 2024-03-25 VITALS
OXYGEN SATURATION: 96 % | SYSTOLIC BLOOD PRESSURE: 125 MMHG | RESPIRATION RATE: 18 BRPM | DIASTOLIC BLOOD PRESSURE: 85 MMHG | HEART RATE: 76 BPM

## 2024-03-25 DIAGNOSIS — J30.81 ALLERGIC RHINITIS DUE TO ANIMAL HAIR AND DANDER: ICD-10-CM

## 2024-03-25 DIAGNOSIS — J30.1 SEASONAL ALLERGIC RHINITIS DUE TO POLLEN: ICD-10-CM

## 2024-03-25 DIAGNOSIS — J30.9 CHRONIC ALLERGIC RHINITIS: Primary | ICD-10-CM

## 2024-03-25 PROCEDURE — 95117 IMMUNOTHERAPY INJECTIONS: CPT | Performed by: NURSE PRACTITIONER

## 2024-03-25 NOTE — PROGRESS NOTES
PATIENT HAS THE FOLLOWING DIAGNOSIS SUPPORTING ADMINISTRATION OF ALLERGY INJECTIONS: J30.1Allergic rhinitis due to pollen  AND 55009 MULTIPLE ALLERGY INJECTIONS FOR ALLERGY INJECTION ADMINISTRATION      Patient here for allergy injection today.  Patient was presented with the opportunity to speak with provider and ask questions regarding allergy injections.  Patient was also instructed they need to wait 30 minutes after receiving allergy injections. All risks associated with potential adverse effects have been explained to patient and patient handout was provided following allergy testing.    After consent obtained/verified, allergy injection subcutaneously given in back of arm(s).  Please see below for specific details of site injections, concentration of serum, and volume injected.    VIAL COLOR OF ALL VIALS TODAY IS red 1:1. Vial allergy w/v concentration today is: 1:1     ALLERGY INJECTION FROM VIAL A GIVEN left  UPPER ARM IN THE AMOUNT OF 0.50 ML    ALLERGY INJECTION FROM VIAL B GIVEN right lower ARM IN THE AMOUNT OF 0.50  ML    ALLERGY INJECTION FROM VIAL C GIVEN rightupper ARM IN THE AMOUNT OF 0.50 ML      Documentation of vial injection specific to arm(s) noted on Allergy Immunotherapy Administration Form.       Patient waited 30 minutes for observation.No  Patient has received information and verbalizes understanding of the potential  health risks associated with allergy injections . Patient understands risks including anaphylaxis and chooses not to wait 30 minutes after administration of allergy injection(s).    Patient tolerated well without adverse reaction while the patient was in the office.    SHOT REACTION TREATMENT INSTRUCTIONS    During the 30 minute wait after an allergy injection the following symptoms should be reported:    Itching other than at the injection site  Hives or swelling other than at the injection site  Redness other than at the injection site  Difficulty breathing  Chest

## 2024-03-28 DIAGNOSIS — J33.9 NASAL POLYPS: ICD-10-CM

## 2024-03-28 RX ORDER — MONTELUKAST SODIUM 10 MG/1
10 TABLET ORAL NIGHTLY
Qty: 90 TABLET | Refills: 0 | OUTPATIENT
Start: 2024-03-28

## 2024-04-01 ENCOUNTER — OFFICE VISIT (OUTPATIENT)
Dept: ALLERGY | Age: 49
End: 2024-04-01
Payer: COMMERCIAL

## 2024-04-01 ENCOUNTER — HOSPITAL ENCOUNTER (OUTPATIENT)
Age: 49
Discharge: HOME | End: 2024-04-01
Payer: COMMERCIAL

## 2024-04-01 VITALS
DIASTOLIC BLOOD PRESSURE: 76 MMHG | HEART RATE: 91 BPM | SYSTOLIC BLOOD PRESSURE: 118 MMHG | OXYGEN SATURATION: 97 % | BODY MASS INDEX: 32.25 KG/M2 | HEIGHT: 63 IN | RESPIRATION RATE: 16 BRPM | WEIGHT: 182 LBS

## 2024-04-01 DIAGNOSIS — Z91.048 ALLERGY TO MOLD: ICD-10-CM

## 2024-04-01 DIAGNOSIS — J32.2 CHRONIC ETHMOIDAL SINUSITIS: ICD-10-CM

## 2024-04-01 DIAGNOSIS — J32.4 CHRONIC PANSINUSITIS: ICD-10-CM

## 2024-04-01 DIAGNOSIS — J33.9 NASAL POLYPS: Primary | ICD-10-CM

## 2024-04-01 DIAGNOSIS — J30.1 ALLERGIC RHINITIS DUE TO POLLEN, UNSPECIFIED SEASONALITY: ICD-10-CM

## 2024-04-01 DIAGNOSIS — J33.9 NASAL POLYPS: ICD-10-CM

## 2024-04-01 DIAGNOSIS — R43.1 ABNORMAL SMELL: ICD-10-CM

## 2024-04-01 DIAGNOSIS — J30.9 CHRONIC ALLERGIC RHINITIS: ICD-10-CM

## 2024-04-01 DIAGNOSIS — J30.81 ALLERGY TO CATS: ICD-10-CM

## 2024-04-01 LAB
BASOPHILS ABSOLUTE: 0.1 THOU/MM3 (ref 0–0.1)
BASOPHILS NFR BLD AUTO: 0.8 %
DEPRECATED RDW RBC AUTO: 43.2 FL (ref 35–45)
EOSINOPHIL NFR BLD AUTO: 2.5 %
EOSINOPHILS ABSOLUTE: 0.2 THOU/MM3 (ref 0–0.4)
ERYTHROCYTE [DISTWIDTH] IN BLOOD BY AUTOMATED COUNT: 13 % (ref 11.5–14.5)
HCT VFR BLD AUTO: 45.4 % (ref 37–47)
HGB BLD-MCNC: 15.3 GM/DL (ref 12–16)
IGG SERPL-MCNC: 1233 MG/DL (ref 700–1600)
IMM GRANULOCYTES # BLD AUTO: 0.02 THOU/MM3 (ref 0–0.07)
IMM GRANULOCYTES NFR BLD AUTO: 0.3 %
LYMPHOCYTES ABSOLUTE: 2.1 THOU/MM3 (ref 1–4.8)
LYMPHOCYTES NFR BLD AUTO: 32.6 %
MCH RBC QN AUTO: 30.7 PG (ref 26–33)
MCHC RBC AUTO-ENTMCNC: 33.7 GM/DL (ref 32.2–35.5)
MCV RBC AUTO: 91 FL (ref 81–99)
MONOCYTES ABSOLUTE: 0.6 THOU/MM3 (ref 0.4–1.3)
MONOCYTES NFR BLD AUTO: 9.4 %
NEUTROPHILS NFR BLD AUTO: 54.4 %
NRBC BLD AUTO-RTO: 0 /100 WBC
PLATELET # BLD AUTO: 257 THOU/MM3 (ref 130–400)
PMV BLD AUTO: 11.2 FL (ref 9.4–12.4)
RBC # BLD AUTO: 4.99 MILL/MM3 (ref 4.2–5.4)
SEGMENTED NEUTROPHILS ABSOLUTE COUNT: 3.5 THOU/MM3 (ref 1.8–7.7)
WBC # BLD AUTO: 6.5 THOU/MM3 (ref 4.8–10.8)

## 2024-04-01 PROCEDURE — 99214 OFFICE O/P EST MOD 30 MIN: CPT | Performed by: NURSE PRACTITIONER

## 2024-04-01 PROCEDURE — 85025 COMPLETE CBC W/AUTO DIFF WBC: CPT

## 2024-04-01 PROCEDURE — 82785 ASSAY OF IGE: CPT

## 2024-04-01 PROCEDURE — 86003 ALLG SPEC IGE CRUDE XTRC EA: CPT

## 2024-04-01 PROCEDURE — 36415 COLL VENOUS BLD VENIPUNCTURE: CPT

## 2024-04-01 PROCEDURE — 82784 ASSAY IGA/IGD/IGG/IGM EACH: CPT

## 2024-04-01 RX ORDER — SOD CHLOR,BICARB/SQUEEZ BOTTLE
PACKET, WITH RINSE DEVICE NASAL
COMMUNITY

## 2024-04-01 RX ORDER — ASCORBIC ACID 500 MG
500 TABLET ORAL DAILY
COMMUNITY

## 2024-04-01 RX ORDER — DUPILUMAB 300 MG/2ML
300 INJECTION, SOLUTION SUBCUTANEOUS
Qty: 2 ADJUSTABLE DOSE PRE-FILLED PEN SYRINGE | Refills: 11 | Status: SHIPPED | OUTPATIENT
Start: 2024-04-01

## 2024-04-01 ASSESSMENT — ENCOUNTER SYMPTOMS
SINUS PAIN: 1
RHINORRHEA: 1

## 2024-04-01 NOTE — PROGRESS NOTES
[facility]    Allergy & Asthma   [address] [place] [address]  Ph:   [phone]  Fax:[phone]    Provider:  Dr. Genesis Aguilar    Follow Up     CHIEF COMPLAINT:   Chief Complaint   Patient presents with    Follow-up     1 year follow up for allergy management/allergy injections    Medication Refill             HISTORY OF PRESENT ILLNESS: ESTABLISHED PATIENT HERE FOR EVALUATION   48-year-old  female here today for chronic ethmoid sinusitis.  Patient continues to do allergy injections recently has decreased them down to once weekly.  Patient has been on several antibiotics in the course of the last several years.  She is also states that she has had 3 sinus surgery.  She continues to have ethmoid sinus plugs, and abnormal foul smell.  She denies any congestion.  She does have rhinorrhea and is got yellow productive mucus.  She does have pictures showing things that she is blowing out of her nose.  Severity of her sinus condition is moderate to severe.  Patient is very tearful when she talks about her sinuses.  She states that she has done everything she can do to be compliant with her medication regime and with her treatment regime but she continues to have a sinus issues.  She was hopeful between the surgeries and the allergy shots that this would help reduce her symptoms but nothing seems to work yet.  Patient states that she has had chronic sinus for years.  There is also found that she had nasal polyps.  She denies any nausea, vomiting, fever.  Patient states that this is impacting her quality of life as she is tired of being sick all the time tired of feeling she has chronic sinus issues.  She recently tried to get into see ENT but states that they told her that she cannot be seen until later on in June.  Patient has continued her sinus irrigation faithfully.  She is also decreasing her use of Xhance.  She is not congested just feels like she has a lot of sinus inflammation and abnormal

## 2024-04-02 ENCOUNTER — TELEPHONE (OUTPATIENT)
Dept: ALLERGY | Age: 49
End: 2024-04-02

## 2024-04-02 LAB
ALLERGEN BERMUDA GRASS IGE: < 0.1 KU/L (ref 0–0.34)
ALLERGEN BIRCH IGE: < 0.1 KU/L (ref 0–0.34)
ALLERGEN DOG DANDER IGE: < 0.1 KU/L (ref 0–0.34)
ALLERGEN GERMAN COCKROACH IGE: < 0.1 KU/L (ref 0–0.34)
ALLERGEN HORMODENDRUM IGE: < 0.1 KUL/L (ref 0–0.34)
ALLERGEN MOUSE EPITHELIA IGE: < 0.1 KU/L (ref 0–0.34)
ALLERGEN OAK TREE IGE: < 0.1 KU/L (ref 0–0.34)
ALLERGEN PECAN TREE IGE: < 0.1 KU/L (ref 0–0.34)
ALLERGEN PIGWEED ROUGH IGE: < 0.1 KU/L (ref 0–0.34)
ALLERGEN SHEEP SORREL (W18) IGE: < 0.1 KU/L (ref 0–0.34)
ALLERGEN TREE SYCAMORE: < 0.1 KU/L (ref 0–0.34)
ALLERGEN WALNUT TREE IGE: < 0.1 KU/L (ref 0–0.34)
ALLERGEN WHITE MULBERRY TREE, IGE: < 0.1 KU/L (ref 0–0.34)
ALLERGEN, TREE, WHITE ASH IGE: < 0.1 KU/L (ref 0–0.34)
ALTERNARIA ALTERNATA: < 0.1 KU/L (ref 0–0.34)
ASPERGILLUS FUMIGATUS: < 0.1 KU/L (ref 0–0.34)
CAT DANDER ANTIBODY: 0.13 KU/L (ref 0–0.34)
COTTONWOOD TREE: < 0.1 KU/L (ref 0–0.34)
D. FARINAE: 1.7 KU/L (ref 0–0.34)
D. PTERONYSSINUS: 1.12 KU/L (ref 0–0.34)
ELM TREE: < 0.1 KU/L (ref 0–0.34)
IGE SERPL-ACNC: 9 IU/ML (ref 0–100)
MAPLE/BOXELDER TREE: < 0.1 KU/L (ref 0–0.34)
MOUNTAIN CEDAR TREE: < 0.1 KU/L (ref 0–0.34)
MUCOR RACEMOSUS: < 0.1 KU/L (ref 0–0.34)
P. NOTATUM: < 0.1 KU/L (ref 0–0.34)
RUSSIAN THISTLE: < 0.1 KU/L (ref 0–0.34)
SHORT RAGWD(A ARTEMIS.) IGE: 0.67 KU/L (ref 0–0.34)
TIMOTHY GRASS: < 0.1 KU/L (ref 0–0.34)

## 2024-04-02 RX ORDER — MONTELUKAST SODIUM 10 MG/1
10 TABLET ORAL NIGHTLY
Qty: 90 TABLET | Refills: 1 | Status: SHIPPED | OUTPATIENT
Start: 2024-04-02

## 2024-04-02 NOTE — TELEPHONE ENCOUNTER
Called pt and informed on lab results and instructions from provider.  Pt verbalized understanding and thanked me.

## 2024-04-02 NOTE — TELEPHONE ENCOUNTER
Provider instructed me to call pt to inform her the allergen lab results came back positive for dustmite, cat and ragweed which she is getting desensitization to that in her allergy injections. She also wants pt to know her allergies are controlled due to normal IGE. Will call pt.

## 2024-04-04 ENCOUNTER — TELEPHONE (OUTPATIENT)
Dept: ALLERGY | Age: 49
End: 2024-04-04

## 2024-04-04 NOTE — TELEPHONE ENCOUNTER
Called pt and informed her culture is going some kind of staph infection. Informed provider will more answers likely tomorrow and will send in an antibiotic according.  Pt verbalized understanding and thanked me.

## 2024-04-04 NOTE — TELEPHONE ENCOUNTER
----- Message from RICH Dawn CNP sent at 4/4/2024  3:49 PM EDT -----  Please tell patient she does have staff.  I am waiting to find out if it is methicillin resistant staph versus just regular staph RES.  We will be watching for results and we will send her in an antibiotic likely tomorrow  ----- Message -----  From: Nabil Lloyd Incoming Lab Results From [facility]  Sent: 4/1/2024   8:40 PM EDT  To: RICH Dawn CNP

## 2024-04-05 ENCOUNTER — TELEPHONE (OUTPATIENT)
Dept: ALLERGY | Age: 49
End: 2024-04-05

## 2024-04-05 RX ORDER — SULFAMETHOXAZOLE AND TRIMETHOPRIM 800; 160 MG/1; MG/1
TABLET ORAL
Qty: 20 TABLET | Refills: 0 | Status: SHIPPED | OUTPATIENT
Start: 2024-04-05

## 2024-04-06 LAB
BACTERIA SPEC AEROBE CULT: ABNORMAL
BACTERIA SPEC ANAEROBE CULT: ABNORMAL
GRAM STN SPEC: ABNORMAL
ORGANISM: ABNORMAL
ORGANISM: ABNORMAL

## 2024-04-07 ENCOUNTER — TELEPHONE (OUTPATIENT)
Dept: ALLERGY | Age: 49
End: 2024-04-07

## 2024-04-07 LAB
FUNGUS SPEC CULT: NORMAL
FUNGUS SPEC FUNGUS STN: NORMAL

## 2024-04-07 RX ORDER — CIPROFLOXACIN 500 MG/1
500 TABLET, FILM COATED ORAL 2 TIMES DAILY
Qty: 20 TABLET | Refills: 0 | Status: SHIPPED | OUTPATIENT
Start: 2024-04-07 | End: 2024-04-17

## 2024-04-08 ENCOUNTER — NURSE ONLY (OUTPATIENT)
Dept: ALLERGY | Age: 49
End: 2024-04-08
Payer: COMMERCIAL

## 2024-04-08 ENCOUNTER — HOSPITAL ENCOUNTER (OUTPATIENT)
Dept: CT IMAGING | Age: 49
Discharge: HOME | End: 2024-04-08
Payer: COMMERCIAL

## 2024-04-08 ENCOUNTER — TELEPHONE (OUTPATIENT)
Dept: ALLERGY | Age: 49
End: 2024-04-08

## 2024-04-08 VITALS
SYSTOLIC BLOOD PRESSURE: 125 MMHG | HEART RATE: 84 BPM | OXYGEN SATURATION: 99 % | DIASTOLIC BLOOD PRESSURE: 92 MMHG | RESPIRATION RATE: 18 BRPM

## 2024-04-08 DIAGNOSIS — J30.81 ALLERGIC RHINITIS DUE TO ANIMAL HAIR AND DANDER: ICD-10-CM

## 2024-04-08 DIAGNOSIS — J32.4 CHRONIC PANSINUSITIS: ICD-10-CM

## 2024-04-08 DIAGNOSIS — J32.2 CHRONIC ETHMOIDAL SINUSITIS: ICD-10-CM

## 2024-04-08 DIAGNOSIS — J30.9 CHRONIC ALLERGIC RHINITIS: Primary | ICD-10-CM

## 2024-04-08 DIAGNOSIS — Z29.89 IMMUNOTHERAPY: ICD-10-CM

## 2024-04-08 DIAGNOSIS — J30.1 ALLERGIC RHINITIS DUE TO POLLEN, UNSPECIFIED SEASONALITY: Primary | ICD-10-CM

## 2024-04-08 DIAGNOSIS — J30.9 CHRONIC ALLERGIC RHINITIS: ICD-10-CM

## 2024-04-08 DIAGNOSIS — R43.1 ABNORMAL SMELL: ICD-10-CM

## 2024-04-08 PROCEDURE — 95117 IMMUNOTHERAPY INJECTIONS: CPT | Performed by: NURSE PRACTITIONER

## 2024-04-08 PROCEDURE — 70486 CT MAXILLOFACIAL W/O DYE: CPT

## 2024-04-08 RX ORDER — EPINEPHRINE 0.3 MG/.3ML
INJECTION SUBCUTANEOUS
Qty: 1 EACH | Refills: 1 | Status: SHIPPED | OUTPATIENT
Start: 2024-04-08

## 2024-04-08 NOTE — TELEPHONE ENCOUNTER
Pt in for allergy injections. Pt's states epi pen is . Pt asking for a new one.       REMIND PATIENTS TO REQUESTS MEDICATIONS DURING THEIR REGULAR OFFICE VISIT EVEN IF THE MEDICATION IS NOT DUE.  THIS SAVES TIME FOR THE PATIENT AND PROVIDER.      IF MEDICATIONS ARE NOT DUE AT THAT TIME, THE PHARMACY MAY HOLD THE PRESCRIPTION TO FILL AT A LATER DATE.    MAKE SURE THE CORRECT PHARMACY IS SELECTED WITH THE MEDICATION REFILL REQUESTS.  MEDICATIONS THAT ARE SENT TO A DIFFERENT PHARMACY WILL NEED TO BE TRANSFERRED BY THE PATIENT TO THE CORRECT PHARMACY.  PLEASE VERIFY THE CORRECT PHARMACY AND LINK THE PHARMACY TO THE REFILL REQUEST.    ____________________________________________________________________________________________________________________________________    Patient requests the following medication to be refilled:      How often does patient take medication? As needed    Patient is requesting 30 days of medication      Pharmacy (need the exact  pharmacy):     Patient last received medication on date: 23  Number of refills given at last fill: 1  (If refills are current the patient does not need another refill)    Last appointment: 24  IF THE PATIENT HAS NOT BEEN SEEN DURING THE LAST YEAR, THE MUST HAVE AN APPT TO BE SEEN AND I WILL ONLD SEND IN 90 DAYS ONE TIME.    Next appt : 25    DID THE PATIENT MISS THE LAST APPT AS A NO SHOW?  no.  IF THE PATIENT WAS A NO SHOW I WILL NOT FILL THE MEDICATION.

## 2024-04-08 NOTE — RESULT ENCOUNTER NOTE
Hello can you tell me if the Defect in the superior aspect of the nasal septum was seen on the former CT 12/19/22 or is this a new finding.  I need this information to determine my next step or if this is chronic in nature.

## 2024-04-08 NOTE — PROGRESS NOTES
Patient is requesting a referral to the infectious disease because of the several recent infections that she has had.  Patient had the following on culture.  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 Clinical correlation required.   Gram Stain Result No segmented neutrophils observed. Rare epithelial cells observed. No organisms observed.   Organism Staphylococcus aureus Abnormal    Aerobic Culture very light growth In the treatment of gram positive infections, GENTAMICIN should be CONSIDERED a SYNERGYSTIC agent ONLY. Ciprofloxacin and Levofloxacin, regardless of in vitro sensitivity, should not be used for staphylococcal infections other than uncomplicated lower UTIs. Moxifloxacin, regardless of in vitro sensitivity, should not be used for staphylococcal infections.   Organism Sphingobacterium multivorum Abnormal    Aerobic Culture very light growth

## 2024-04-08 NOTE — PROGRESS NOTES
PATIENT HAS THE FOLLOWING DIAGNOSIS SUPPORTING ADMINISTRATION OF ALLERGY INJECTIONS: J30.1Allergic rhinitis due to pollen  AND 29430 MULTIPLE ALLERGY INJECTIONS FOR ALLERGY INJECTION ADMINISTRATION      Patient here for allergy injection today.  Patient was presented with the opportunity to speak with provider and ask questions regarding allergy injections.  Patient was also instructed they need to wait 30 minutes after receiving allergy injections. All risks associated with potential adverse effects have been explained to patient and patient handout was provided following allergy testing.    After consent obtained/verified, allergy injection subcutaneously given in back of arm(s).  Please see below for specific details of site injections, concentration of serum, and volume injected.    VIAL COLOR OF ALL VIALS TODAY IS red 1:1. Vial allergy w/v concentration today is: 1:1     ALLERGY INJECTION FROM VIAL A GIVEN Right UPPER ARM IN THE AMOUNT OF 0.50 ML    ALLERGY INJECTION FROM VIAL B GIVEN left upper ARM IN THE AMOUNT OF 0.50  ML    ALLERGY INJECTION FROM VIAL C GIVEN leftlower ARM IN THE AMOUNT OF 0.50 ML      Documentation of vial injection specific to arm(s) noted on Allergy Immunotherapy Administration Form.       Patient waited 30 minutes for observation.No  Patient has received information and verbalizes understanding of the potential  health risks associated with allergy injections . Patient understands risks including anaphylaxis and chooses not to wait 30 minutes after administration of allergy injection(s).    Patient tolerated well without adverse reaction while the patient was in the office.    SHOT REACTION TREATMENT INSTRUCTIONS    During the 30 minute wait after an allergy injection the following symptoms should be reported:    Itching other than at the injection site  Hives or swelling other than at the injection site  Redness other than at the injection site  Difficulty breathing  Chest

## 2024-04-08 NOTE — TELEPHONE ENCOUNTER
Pt in for allergy injections. Pt wrote a list of all her antibiotics she has been in since her ENT surgeries starting in 4/30/21. List scanned into media. Pt is worried about this and if this can ever spread to her brain. Pt is asking if she can have a referral to infectious disease for all of this for another opinion.     Please advise.

## 2024-04-09 NOTE — TELEPHONE ENCOUNTER
Provider states to wait for referral until 2nd culture is done and pt has completed antibiotic course. Provider states to wait until beginning of June to re swab.  Pt verbalized understanding and thanked me.

## 2024-04-10 DIAGNOSIS — J32.2 CHRONIC ETHMOIDAL SINUSITIS: Primary | ICD-10-CM

## 2024-04-10 NOTE — PROGRESS NOTES
Discussed CT scan with patient.  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  I did instruct patient to stop using all nasal steroids.  She also should make sure that she is complaining her lavage on a regular basis.  Patient has been following recommended guidelines and using distilled water as well.  Patient will need a repeat nasal culture 2 weeks following completion of her antibiotics.  She is in agreement that she should wait to see infectious disease if it is indicated after the second nasal culture.

## 2024-04-24 ENCOUNTER — NURSE ONLY (OUTPATIENT)
Dept: ALLERGY | Age: 49
End: 2024-04-24
Payer: COMMERCIAL

## 2024-04-24 VITALS
HEIGHT: 63 IN | SYSTOLIC BLOOD PRESSURE: 125 MMHG | OXYGEN SATURATION: 98 % | BODY MASS INDEX: 32.24 KG/M2 | HEART RATE: 84 BPM | DIASTOLIC BLOOD PRESSURE: 80 MMHG

## 2024-04-24 DIAGNOSIS — J30.1 ALLERGIC RHINITIS DUE TO POLLEN, UNSPECIFIED SEASONALITY: ICD-10-CM

## 2024-04-24 DIAGNOSIS — J30.9 CHRONIC ALLERGIC RHINITIS: Primary | ICD-10-CM

## 2024-04-24 DIAGNOSIS — J30.81 ALLERGIC RHINITIS DUE TO ANIMAL HAIR AND DANDER: ICD-10-CM

## 2024-04-24 PROCEDURE — 95117 IMMUNOTHERAPY INJECTIONS: CPT | Performed by: NURSE PRACTITIONER

## 2024-04-24 NOTE — PROGRESS NOTES
tightness  Difficulty swallowing  Throat tightness    If these symptoms occur, NOTIFY PROVIDER and the following treatment should be administered:    1.  Epinephrine/Auvi Q 1:1000 IM - 0.3 ml if > 66 lbs or more, 0.15 ml if 33 - 63 lbs, or 0.1 ml if <33 lbs     2.  Diphenhydramine - give all intramuscular:     2 to <6 years (off-label use): 6.25 mg,    6 to <12 years: 12.5 to 25 mg;    ?12 years: 25-50 mg.    3.  Famotidine:  Adults 40 mg oral    Adolescents age 16 years and >88 lbs: 40 mg    Children and Adolescents ?16 years of age: Initial: 0.25 mg/kg/dose  every 12 hours (maximum daily dose: 40 mg/day)    Epi/Auvi Q dose may me repeated in 5-15 minutes if adequate resolution of symptoms does not occur    Patient should be observed for at least one hour after final Epi/Auvi Q dose and must be seen by provider.  Patients cannot drive themselves if they have received diphenhydramine.

## 2024-05-06 ENCOUNTER — NURSE ONLY (OUTPATIENT)
Dept: ALLERGY | Age: 49
End: 2024-05-06
Payer: COMMERCIAL

## 2024-05-06 VITALS
DIASTOLIC BLOOD PRESSURE: 96 MMHG | OXYGEN SATURATION: 98 % | RESPIRATION RATE: 16 BRPM | HEART RATE: 81 BPM | SYSTOLIC BLOOD PRESSURE: 140 MMHG

## 2024-05-06 DIAGNOSIS — J30.9 CHRONIC ALLERGIC RHINITIS: Primary | ICD-10-CM

## 2024-05-06 DIAGNOSIS — J30.1 ALLERGIC RHINITIS DUE TO POLLEN, UNSPECIFIED SEASONALITY: ICD-10-CM

## 2024-05-06 DIAGNOSIS — J30.81 ALLERGIC RHINITIS DUE TO ANIMAL HAIR AND DANDER: ICD-10-CM

## 2024-05-06 LAB
FUNGUS SPEC CULT: NORMAL
FUNGUS SPEC FUNGUS STN: NORMAL

## 2024-05-06 PROCEDURE — 95117 IMMUNOTHERAPY INJECTIONS: CPT | Performed by: NURSE PRACTITIONER

## 2024-05-06 NOTE — PROGRESS NOTES
PATIENT HAS THE FOLLOWING DIAGNOSIS SUPPORTING ADMINISTRATION OF ALLERGY INJECTIONS: J30.1Allergic rhinitis due to pollen  AND 22687 MULTIPLE ALLERGY INJECTIONS FOR ALLERGY INJECTION ADMINISTRATION      Patient here for allergy injection today.  Patient was presented with the opportunity to speak with provider and ask questions regarding allergy injections.  Patient was also instructed they need to wait 30 minutes after receiving allergy injections. All risks associated with potential adverse effects have been explained to patient and patient handout was provided following allergy testing.    After consent obtained/verified, allergy injection subcutaneously given in back of arm(s).  Please see below for specific details of site injections, concentration of serum, and volume injected.    VIAL COLOR OF ALL VIALS TODAY IS red 1:1. Vial allergy w/v concentration today is: 1:1     ALLERGY INJECTION FROM VIAL A GIVEN right  UPPER ARM IN THE AMOUNT OF 0.50 ML    ALLERGY INJECTION FROM VIAL B GIVEN left lower ARM IN THE AMOUNT OF 0.50  ML    ALLERGY INJECTION FROM VIAL C GIVEN leftupper ARM IN THE AMOUNT OF 0.50 ML      Documentation of vial injection specific to arm(s) noted on Allergy Immunotherapy Administration Form.       Patient waited 30 minutes for observation.No  Patient has received information and verbalizes understanding of the potential  health risks associated with allergy injections . Patient understands risks including anaphylaxis and chooses not to wait 30 minutes after administration of allergy injection(s).    Patient tolerated well without adverse reaction while the patient was in the office.    SHOT REACTION TREATMENT INSTRUCTIONS    During the 30 minute wait after an allergy injection the following symptoms should be reported:    Itching other than at the injection site  Hives or swelling other than at the injection site  Redness other than at the injection site  Difficulty breathing  Chest

## 2024-05-22 DIAGNOSIS — J30.9 CHRONIC ALLERGIC RHINITIS: Primary | ICD-10-CM

## 2024-05-22 DIAGNOSIS — Z29.89 IMMUNOTHERAPY: ICD-10-CM

## 2024-05-22 NOTE — PROGRESS NOTES
ALLERGY EXTRACT MIXING.  PT'S CURRENT VIALS ARE EMPTY. NEW VIALS MIXED.   DATE OF MIXING= 5/22/24  TOTAL NUMBER OF SET OF VIALS= 3  TOTAL VIALS PREPARED = 3  TOTAL INJECTABLE DOSES =   20 INJECTIONS PER SET  TOTAL ANTICIPATED DOSES = 60 INJECTIONS   TOTAL NUMBER OF INJECTIONS BILLED TODAY = 30     ALLERGY IMMUNOTHERAPY DOSES IS BILLED AT NO MORE THAN 30 UNITS PER MONTH.  TOTAL DOSES BILLED IS AT 30 DOSES PER MONTH UNTIL ALL ANTICIPATED DOSES ARE BILLED.     An allergy extract was prepared according to written prescription by provider.  Provider onsite during allergy extract preparation. Patient vial(s) include the following:     RED VIAL - 1:1 CONCENTRATION - EXPIRES 12 MONTHS  YELLOW VIAL-1:10 CONCENTRATION - EXPIRES 12 MONTHS  BLUE VIAL-1:100 CONCENTRATION - EXPIRES 12 MONTHS  GREEN VIAL-1:1,000 CONCENTRATION - EXPIRES 6 MONTHS  SILVER VIAL-1:10,000 CONCENTRATION - EXPIRES 6 MONTHS    Allergy extract was prepared by the following method:   RED TO YELLOW:  Once the  one-to-one concentration was prepared in the red vial, 0.5 ML's was then extracted in place into the yellow vial to achieve a 1:10 concentration.    YELLOW TO BLUE:  Then 0.5 ML's was extracted from the yellow vial and placed into the blue file with dilutant to achieve a 1:100 concentration.    BLUE TO GREEN:  Then 0.5 ML's was extracted from the blue vial and placed into Green vial with dilute to achieve a 1:1,000 concentration.    GREEN TO SILVER:  Then 0.5 ML's was taken from the green vial and placed in the Silver vial with dilutant to achieve a 1:10,000 concentration.      Patient vials were labeled with name, date-of-birth, vial (A,B,or C), concentration, and expiration.    When injecting from a new  vial the first injections is 0.05 ml and are increased by 0.05 increments until 0.5ml from the vial is achieved or the patient reaches maximum tolerated dose.   Injections are given once ot three times weekly and at least 24 hours apart, according to

## 2024-05-23 ENCOUNTER — TELEPHONE (OUTPATIENT)
Dept: ALLERGY | Age: 49
End: 2024-05-23

## 2024-05-23 ENCOUNTER — NURSE ONLY (OUTPATIENT)
Dept: ALLERGY | Age: 49
End: 2024-05-23
Payer: COMMERCIAL

## 2024-05-23 VITALS
DIASTOLIC BLOOD PRESSURE: 89 MMHG | RESPIRATION RATE: 18 BRPM | SYSTOLIC BLOOD PRESSURE: 132 MMHG | HEART RATE: 81 BPM | OXYGEN SATURATION: 97 %

## 2024-05-23 DIAGNOSIS — J30.9 CHRONIC ALLERGIC RHINITIS: Primary | ICD-10-CM

## 2024-05-23 DIAGNOSIS — J30.1 ALLERGIC RHINITIS DUE TO POLLEN, UNSPECIFIED SEASONALITY: ICD-10-CM

## 2024-05-23 PROCEDURE — 95117 IMMUNOTHERAPY INJECTIONS: CPT | Performed by: NURSE PRACTITIONER

## 2024-05-23 NOTE — TELEPHONE ENCOUNTER
Patient has done done very well on Dupixent.  She reports improved in quality of life and symptom control with nasal polyposis.  Patient would like to continue medication.  She denies any side effects or any problems with medication.

## 2024-05-23 NOTE — TELEPHONE ENCOUNTER
Pt in office for allergy injections. Informed on pa approval for dupixent.  Pt verbalized understanding and thanked me.

## 2024-05-23 NOTE — TELEPHONE ENCOUNTER
Received fax on PA approval for dupixent valid until 5/23/2025. [id number]. Fax scanned into media.

## 2024-05-23 NOTE — PROGRESS NOTES
PATIENT HAS THE FOLLOWING DIAGNOSIS SUPPORTING ADMINISTRATION OF ALLERGY INJECTIONS: J30.1Allergic rhinitis due to pollen  AND [id number] MULTIPLE ALLERGY INJECTIONS FOR ALLERGY INJECTION ADMINISTRATION      Patient here for allergy injection today.  Patient was presented with the opportunity to speak with provider and ask questions regarding allergy injections.  Patient was also instructed they need to wait 30 minutes after receiving allergy injections. All risks associated with potential adverse effects have been explained to patient and patient handout was provided following allergy testing.    After consent obtained/verified, allergy injection subcutaneously given in back of arm(s).  Please see below for specific details of site injections, concentration of serum, and volume injected.    VIAL COLOR OF ALL VIALS TODAY IS red 1:1. Vial allergy w/v concentration today is: 1:1     ALLERGY INJECTION FROM VIAL A GIVEN left  UPPER ARM IN THE AMOUNT OF 0.50 ML    ALLERGY INJECTION FROM VIAL B GIVEN right lower ARM IN THE AMOUNT OF 0.50  ML    ALLERGY INJECTION FROM VIAL C GIVEN rightupper ARM IN THE AMOUNT OF 0.50 ML      Documentation of vial injection specific to arm(s) noted on Allergy Immunotherapy Administration Form.       Patient waited 30 minutes for observation.No  Patient has received information and verbalizes understanding of the potential  health risks associated with allergy injections . Patient understands risks including anaphylaxis and chooses not to wait 30 minutes after administration of allergy injection(s).    Patient tolerated well without adverse reaction while the patient was in the office.    SHOT REACTION TREATMENT INSTRUCTIONS    During the 30 minute wait after an allergy injection the following symptoms should be reported:    Itching other than at the injection site  Hives or swelling other than at the injection site  Redness other than at the injection site  Difficulty breathing  Chest

## 2024-06-03 ENCOUNTER — NURSE ONLY (OUTPATIENT)
Dept: ALLERGY | Age: 49
End: 2024-06-03
Payer: COMMERCIAL

## 2024-06-03 VITALS
SYSTOLIC BLOOD PRESSURE: 124 MMHG | DIASTOLIC BLOOD PRESSURE: 99 MMHG | OXYGEN SATURATION: 96 % | RESPIRATION RATE: 18 BRPM | HEART RATE: 80 BPM

## 2024-06-03 DIAGNOSIS — J30.1 ALLERGIC RHINITIS DUE TO POLLEN, UNSPECIFIED SEASONALITY: ICD-10-CM

## 2024-06-03 DIAGNOSIS — J30.81 ALLERGIC RHINITIS DUE TO ANIMAL HAIR AND DANDER: ICD-10-CM

## 2024-06-03 DIAGNOSIS — J30.9 CHRONIC ALLERGIC RHINITIS: Primary | ICD-10-CM

## 2024-06-03 PROCEDURE — 95117 IMMUNOTHERAPY INJECTIONS: CPT | Performed by: NURSE PRACTITIONER

## 2024-06-03 NOTE — PROGRESS NOTES
PATIENT HAS THE FOLLOWING DIAGNOSIS SUPPORTING ADMINISTRATION OF ALLERGY INJECTIONS: J30.1Allergic rhinitis due to pollen  AND 57599 MULTIPLE ALLERGY INJECTIONS FOR ALLERGY INJECTION ADMINISTRATION      Patient here for allergy injection today.  Patient was presented with the opportunity to speak with provider and ask questions regarding allergy injections.  Patient was also instructed they need to wait 30 minutes after receiving allergy injections. All risks associated with potential adverse effects have been explained to patient and patient handout was provided following allergy testing.    After consent obtained/verified, allergy injection subcutaneously given in back of arm(s).  Please see below for specific details of site injections, concentration of serum, and volume injected.    VIAL COLOR OF ALL VIALS TODAY IS red 1:1. Vial allergy w/v concentration today is: 1:1     ALLERGY INJECTION FROM VIAL A GIVEN right  UPPER ARM IN THE AMOUNT OF 0.50 ML    ALLERGY INJECTION FROM VIAL B GIVEN left upper ARM IN THE AMOUNT OF 0.50  ML    ALLERGY INJECTION FROM VIAL C GIVEN leftlower ARM IN THE AMOUNT OF 0.50 ML      Documentation of vial injection specific to arm(s) noted on Allergy Immunotherapy Administration Form.       Patient waited 30 minutes for observation.No  Patient has received information and verbalizes understanding of the potential  health risks associated with allergy injections . Patient understands risks including anaphylaxis and chooses not to wait 30 minutes after administration of allergy injection(s).    Patient tolerated well without adverse reaction while the patient was in the office.    SHOT REACTION TREATMENT INSTRUCTIONS    During the 30 minute wait after an allergy injection the following symptoms should be reported:    Itching other than at the injection site  Hives or swelling other than at the injection site  Redness other than at the injection site  Difficulty breathing  Chest

## 2024-06-09 LAB
BACTERIA SPEC AEROBE CULT: NORMAL
BACTERIA SPEC ANAEROBE CULT: NORMAL
GRAM STN SPEC: NORMAL

## 2024-06-10 ENCOUNTER — TELEPHONE (OUTPATIENT)
Dept: ALLERGY | Age: 49
End: 2024-06-10

## 2024-06-10 DIAGNOSIS — Z29.89 IMMUNOTHERAPY: ICD-10-CM

## 2024-06-10 DIAGNOSIS — J30.9 CHRONIC ALLERGIC RHINITIS: Primary | ICD-10-CM

## 2024-06-10 NOTE — TELEPHONE ENCOUNTER
----- Message from RICH Dawn CNP sent at 6/10/2024  8:26 AM EDT -----  Regarding: patient nasal culture  Patient's nasal culture did not demonstrate any infection at this time.  I do want her to do lots of sinus rinses making sure she is using distilled water and a clean bottle.  She should do these 2-3 times a day.  She has a follow-up appointment with Dr. Can on 24 June.  I want her to keep this appointment          ----- Message -----  From: Nbail Lloyd Incoming Lab Results From [facility]  Sent: 6/4/2024   9:11 PM EDT  To: RICH Dawn CNP

## 2024-06-10 NOTE — PROGRESS NOTES
ALLERGY EXTRACT MIXING.  Monthly billing  DATE OF MIXING= 5/22/24  TOTAL NUMBER OF SET OF VIALS= 3  TOTAL VIALS PREPARED = 3  TOTAL INJECTABLE DOSES =   20 INJECTIONS PER SET  TOTAL ANTICIPATED DOSES = 60 INJECTIONS   TOTAL NUMBER OF INJECTIONS BILLED TODAY = 30        ALLERGY IMMUNOTHERAPY DOSES IS BILLED AT NO MORE THAN 30 UNITS PER MONTH.  TOTAL DOSES BILLED IS AT 30 DOSES PER MONTH UNTIL ALL ANTICIPATED DOSES ARE BILLED.     An allergy extract was prepared according to written prescription by provider.  Provider onsite during allergy extract preparation. Patient vial(s) include the following:     RED VIAL - 1:1 CONCENTRATION - EXPIRES 12 MONTHS  YELLOW VIAL-1:10 CONCENTRATION - EXPIRES 12 MONTHS  BLUE VIAL-1:100 CONCENTRATION - EXPIRES 12 MONTHS  GREEN VIAL-1:1,000 CONCENTRATION - EXPIRES 6 MONTHS  SILVER VIAL-1:10,000 CONCENTRATION - EXPIRES 6 MONTHS    Allergy extract was prepared by the following method:   RED TO YELLOW:  Once the  one-to-one concentration was prepared in the red vial, 0.5 ML's was then extracted in place into the yellow vial to achieve a 1:10 concentration.    YELLOW TO BLUE:  Then 0.5 ML's was extracted from the yellow vial and placed into the blue file with dilutant to achieve a 1:100 concentration.    BLUE TO GREEN:  Then 0.5 ML's was extracted from the blue vial and placed into Green vial with dilute to achieve a 1:1,000 concentration.    GREEN TO SILVER:  Then 0.5 ML's was taken from the green vial and placed in the Silver vial with dilutant to achieve a 1:10,000 concentration.      Patient vials were labeled with name, date-of-birth, vial (A,B,or C), concentration, and expiration.    When injecting from a new  vial the first injections is 0.05 ml and are increased by 0.05 increments until 0.5ml from the vial is achieved or the patient reaches maximum tolerated dose.   Injections are given once ot three times weekly and at least 24 hours apart, according to provider orders.   If

## 2024-06-17 ENCOUNTER — NURSE ONLY (OUTPATIENT)
Dept: ALLERGY | Age: 49
End: 2024-06-17
Payer: COMMERCIAL

## 2024-06-17 VITALS
DIASTOLIC BLOOD PRESSURE: 86 MMHG | RESPIRATION RATE: 18 BRPM | OXYGEN SATURATION: 98 % | SYSTOLIC BLOOD PRESSURE: 123 MMHG | HEART RATE: 85 BPM

## 2024-06-17 DIAGNOSIS — J30.9 CHRONIC ALLERGIC RHINITIS: Primary | ICD-10-CM

## 2024-06-17 DIAGNOSIS — J30.1 ALLERGIC RHINITIS DUE TO POLLEN, UNSPECIFIED SEASONALITY: ICD-10-CM

## 2024-06-17 PROCEDURE — 95117 IMMUNOTHERAPY INJECTIONS: CPT | Performed by: NURSE PRACTITIONER

## 2024-06-17 NOTE — PROGRESS NOTES

## 2024-06-24 ENCOUNTER — OFFICE VISIT (OUTPATIENT)
Dept: ENT CLINIC | Age: 49
End: 2024-06-24
Payer: COMMERCIAL

## 2024-06-24 VITALS
RESPIRATION RATE: 18 BRPM | SYSTOLIC BLOOD PRESSURE: 120 MMHG | HEART RATE: 74 BPM | HEIGHT: 63 IN | DIASTOLIC BLOOD PRESSURE: 80 MMHG | OXYGEN SATURATION: 95 % | WEIGHT: 176.8 LBS | BODY MASS INDEX: 31.33 KG/M2 | TEMPERATURE: 97.4 F

## 2024-06-24 DIAGNOSIS — R09.82 POST-NASAL DRAINAGE: ICD-10-CM

## 2024-06-24 DIAGNOSIS — J32.9 CHRONIC RHINOSINUSITIS: Primary | ICD-10-CM

## 2024-06-24 DIAGNOSIS — J33.9 NASAL POLYPOSIS: ICD-10-CM

## 2024-06-24 PROCEDURE — 99214 OFFICE O/P EST MOD 30 MIN: CPT | Performed by: OTOLARYNGOLOGY

## 2024-06-24 NOTE — PROGRESS NOTES
[facility] [place] [facility]  [facility]  [address]  [address]  [facility] [address]  Dept: [phone]  Dept Fax: [phone]  Loc: [phone]    Lashae Coleman is a 49 y.o. female who was referred by No ref. provider found for:  Chief Complaint   Patient presents with    Follow-up     Patient here for 6 month f/u.Patient stated that she is doing good since last visit.       HPI:     Lashae Coleman is a 49 y.o. female with a long history of severe chronic rhinosinusitis, recurring infections of Staph aureus, nasal polyposis and associated severe symptoms.  The patient returns today overall having been doing well but periodically needing antibiotics for culture positive infections.  She remains on Dupixent and is no longer on topical steroids.  Instead she is on a spray antihistamine.  She has had a fresh CT scan for my review.     History:     Allergies   Allergen Reactions    Freeburn [Macadamia Nut Oil]      Almonds per allergy testing    Seasonal      Current Outpatient Medications   Medication Sig Dispense Refill    EPINEPHrine (EPIPEN 2-KAYE) 0.3 MG/0.3ML SOAJ injection Inject one pen as directed STAT for allergic reaction, may disp generic NDC 48852-221-44 1 each 1    sulfamethoxazole-trimethoprim (BACTRIM DS) 800-160 MG per tablet One tablet twice daily for 10 days 20 tablet 0    montelukast (SINGULAIR) 10 MG tablet Take 1 tablet by mouth nightly 90 tablet 1    vitamin C (ASCORBIC ACID) 500 MG tablet Take 1 tablet by mouth daily      Zinc Sulfate (ZINC 15 PO) Take by mouth      Hypertonic Nasal Wash (SINUS RINSE BOTTLE KIT) PACK by Nasal route      DUPIXENT 300 MG/2ML SOPN injection Inject 2 mLs into the skin every 14 days 2 Adjustable Dose Pre-filled Pen Syringe 11    meclizine (ANTIVERT) 25 MG tablet Take 1 tablet by mouth 4 times daily as needed for Dizziness 40 tablet 1    albuterol (PROVENTIL) (2.5 MG/3ML) 0.083% nebulizer solution Take 3 mLs by

## 2024-07-03 ENCOUNTER — NURSE ONLY (OUTPATIENT)
Dept: ALLERGY | Age: 49
End: 2024-07-03
Payer: COMMERCIAL

## 2024-07-03 VITALS — OXYGEN SATURATION: 97 % | SYSTOLIC BLOOD PRESSURE: 119 MMHG | DIASTOLIC BLOOD PRESSURE: 74 MMHG | HEART RATE: 66 BPM

## 2024-07-03 DIAGNOSIS — J30.81 ALLERGIC RHINITIS DUE TO ANIMAL HAIR AND DANDER: ICD-10-CM

## 2024-07-03 DIAGNOSIS — J30.1 ALLERGIC RHINITIS DUE TO POLLEN, UNSPECIFIED SEASONALITY: ICD-10-CM

## 2024-07-03 DIAGNOSIS — J30.9 CHRONIC ALLERGIC RHINITIS: Primary | ICD-10-CM

## 2024-07-03 PROCEDURE — 95117 IMMUNOTHERAPY INJECTIONS: CPT | Performed by: NURSE PRACTITIONER

## 2024-07-03 NOTE — PROGRESS NOTES
PATIENT HAS THE FOLLOWING DIAGNOSIS SUPPORTING ADMINISTRATION OF ALLERGY INJECTIONS: J30.1Allergic rhinitis due to pollen  AND 35938 MULTIPLE ALLERGY INJECTIONS FOR ALLERGY INJECTION ADMINISTRATION      Patient here for allergy injection today.  Patient was presented with the opportunity to speak with provider and ask questions regarding allergy injections.  Patient was also instructed they need to wait 30 minutes after receiving allergy injections. All risks associated with potential adverse effects have been explained to patient and patient handout was provided following allergy testing.    After consent obtained/verified, allergy injection subcutaneously given in back of arm(s).  Please see below for specific details of site injections, concentration of serum, and volume injected.    VIAL COLOR OF ALL VIALS TODAY IS red 1:1. Vial allergy w/v concentration today is: 1:1     ALLERGY INJECTION FROM VIAL A GIVEN right  UPPER ARM IN THE AMOUNT OF 0.50 ML    ALLERGY INJECTION FROM VIAL B GIVEN left lower ARM IN THE AMOUNT OF 0.50  ML    ALLERGY INJECTION FROM VIAL C GIVEN leftupper ARM IN THE AMOUNT OF 0.50 ML      Documentation of vial injection specific to arm(s) noted on Allergy Immunotherapy Administration Form.       Patient waited 30 minutes for observation.No  Patient has received information and verbalizes understanding of the potential  health risks associated with allergy injections . Patient understands risks including anaphylaxis and chooses not to wait 30 minutes after administration of allergy injection(s).    Patient tolerated well without adverse reaction while the patient was in the office.    SHOT REACTION TREATMENT INSTRUCTIONS    During the 30 minute wait after an allergy injection the following symptoms should be reported:    Itching other than at the injection site  Hives or swelling other than at the injection site  Redness other than at the injection site  Difficulty breathing  Chest

## 2024-07-16 DIAGNOSIS — J30.9 ALLERGIC CONJUNCTIVITIS OF BOTH EYES AND RHINITIS: ICD-10-CM

## 2024-07-16 DIAGNOSIS — H10.13 ALLERGIC CONJUNCTIVITIS OF BOTH EYES AND RHINITIS: ICD-10-CM

## 2024-07-17 ENCOUNTER — LAB (OUTPATIENT)
Dept: ALLERGY | Age: 49
End: 2024-07-17
Payer: COMMERCIAL

## 2024-07-17 VITALS
SYSTOLIC BLOOD PRESSURE: 111 MMHG | OXYGEN SATURATION: 98 % | HEIGHT: 63 IN | BODY MASS INDEX: 31.32 KG/M2 | DIASTOLIC BLOOD PRESSURE: 86 MMHG | HEART RATE: 82 BPM

## 2024-07-17 DIAGNOSIS — J30.9 CHRONIC ALLERGIC RHINITIS: Primary | ICD-10-CM

## 2024-07-17 DIAGNOSIS — J30.1 ALLERGIC RHINITIS DUE TO POLLEN, UNSPECIFIED SEASONALITY: ICD-10-CM

## 2024-07-17 PROCEDURE — 95117 IMMUNOTHERAPY INJECTIONS: CPT | Performed by: NURSE PRACTITIONER

## 2024-07-17 NOTE — TELEPHONE ENCOUNTER
REMIND PATIENTS TO REQUESTS MEDICATIONS DURING THEIR REGULAR OFFICE VISIT EVEN IF THE MEDICATION IS NOT DUE.  THIS SAVES TIME FOR THE PATIENT AND PROVIDER.      IF MEDICATIONS ARE NOT DUE AT THAT TIME, THE PHARMACY MAY HOLD THE PRESCRIPTION TO FILL AT A LATER DATE.    MAKE SURE THE CORRECT PHARMACY IS SELECTED WITH THE MEDICATION REFILL REQUESTS.  MEDICATIONS THAT ARE SENT TO A DIFFERENT PHARMACY WILL NEED TO BE TRANSFERRED BY THE PATIENT TO THE CORRECT PHARMACY.  PLEASE VERIFY THE CORRECT PHARMACY AND LINK THE PHARMACY TO THE REFILL REQUEST.    ____________________________________________________________________________________________________________________________________    Patient requests the following medication to be refilled:      How often does patient take medication? PRN    Patient is requesting 30 days of medication      Pharmacy (need the exact  pharmacy):     Patient last received medication on date: 3/28/22  Number of refills given at last fill: 2  (If refills are current the patient does not need another refill)    Last appointment: 4/1/24  IF THE PATIENT HAS NOT BEEN SEEN DURING THE LAST YEAR, THE MUST HAVE AN APPT TO BE SEEN AND I WILL ONLD SEND IN 90 DAYS ONE TIME.    Next appt : 4/7/25    DID THE PATIENT MISS THE LAST APPT AS A NO SHOW?  no.  IF THE PATIENT WAS A NO SHOW I WILL NOT FILL THE MEDICATION.

## 2024-07-17 NOTE — PROGRESS NOTES

## 2024-07-30 ENCOUNTER — LAB (OUTPATIENT)
Dept: ALLERGY | Age: 49
End: 2024-07-30
Payer: COMMERCIAL

## 2024-07-30 VITALS
RESPIRATION RATE: 18 BRPM | OXYGEN SATURATION: 98 % | DIASTOLIC BLOOD PRESSURE: 79 MMHG | SYSTOLIC BLOOD PRESSURE: 116 MMHG | HEART RATE: 85 BPM

## 2024-07-30 DIAGNOSIS — J30.81 ALLERGIC RHINITIS DUE TO ANIMAL HAIR AND DANDER: ICD-10-CM

## 2024-07-30 DIAGNOSIS — J30.1 ALLERGIC RHINITIS DUE TO POLLEN, UNSPECIFIED SEASONALITY: ICD-10-CM

## 2024-07-30 DIAGNOSIS — J30.9 CHRONIC ALLERGIC RHINITIS: Primary | ICD-10-CM

## 2024-07-30 PROCEDURE — 95117 IMMUNOTHERAPY INJECTIONS: CPT | Performed by: NURSE PRACTITIONER

## 2024-08-08 ENCOUNTER — TELEPHONE (OUTPATIENT)
Dept: ALLERGY | Age: 49
End: 2024-08-08

## 2024-08-08 DIAGNOSIS — J32.4 CHRONIC PANSINUSITIS: Primary | ICD-10-CM

## 2024-08-08 DIAGNOSIS — J33.9 NASAL POLYPS: ICD-10-CM

## 2024-08-08 NOTE — TELEPHONE ENCOUNTER
Called pt and informed referral placed. Pt states the bad smell that she has when she have a staph infection is back and wondering if she can be re cultured.     Please advise.

## 2024-08-08 NOTE — PROGRESS NOTES
Patient called and requesting a referral to another ENT for second opinion evaluation.  Will send per patients request.

## 2024-08-08 NOTE — TELEPHONE ENCOUNTER
Pt called in stating she would like referral to different ENT as talked about with provider in visit. Informed provider. Order placed.

## 2024-08-15 ENCOUNTER — NURSE ONLY (OUTPATIENT)
Dept: ALLERGY | Age: 49
End: 2024-08-15
Payer: COMMERCIAL

## 2024-08-15 VITALS
RESPIRATION RATE: 18 BRPM | HEART RATE: 89 BPM | DIASTOLIC BLOOD PRESSURE: 84 MMHG | OXYGEN SATURATION: 98 % | SYSTOLIC BLOOD PRESSURE: 120 MMHG

## 2024-08-15 DIAGNOSIS — J30.1 ALLERGIC RHINITIS DUE TO POLLEN, UNSPECIFIED SEASONALITY: ICD-10-CM

## 2024-08-15 DIAGNOSIS — J30.81 ALLERGIC RHINITIS DUE TO ANIMAL HAIR AND DANDER: ICD-10-CM

## 2024-08-15 DIAGNOSIS — J30.9 CHRONIC ALLERGIC RHINITIS: Primary | ICD-10-CM

## 2024-08-15 PROCEDURE — 95117 IMMUNOTHERAPY INJECTIONS: CPT | Performed by: NURSE PRACTITIONER

## 2024-08-15 NOTE — PROGRESS NOTES

## 2024-08-29 ENCOUNTER — LAB (OUTPATIENT)
Dept: ALLERGY | Age: 49
End: 2024-08-29
Payer: COMMERCIAL

## 2024-08-29 VITALS
RESPIRATION RATE: 18 BRPM | HEART RATE: 81 BPM | DIASTOLIC BLOOD PRESSURE: 77 MMHG | SYSTOLIC BLOOD PRESSURE: 108 MMHG | OXYGEN SATURATION: 98 %

## 2024-08-29 DIAGNOSIS — J30.1 ALLERGIC RHINITIS DUE TO POLLEN, UNSPECIFIED SEASONALITY: ICD-10-CM

## 2024-08-29 DIAGNOSIS — J30.9 CHRONIC ALLERGIC RHINITIS: Primary | ICD-10-CM

## 2024-08-29 DIAGNOSIS — J30.81 ALLERGIC RHINITIS DUE TO ANIMAL HAIR AND DANDER: ICD-10-CM

## 2024-08-29 PROCEDURE — 95117 IMMUNOTHERAPY INJECTIONS: CPT | Performed by: NURSE PRACTITIONER

## 2024-08-29 NOTE — PROGRESS NOTES
PATIENT HAS THE FOLLOWING DIAGNOSIS SUPPORTING ADMINISTRATION OF ALLERGY INJECTIONS: J30.1Allergic rhinitis due to pollen  AND 12759 MULTIPLE ALLERGY INJECTIONS FOR ALLERGY INJECTION ADMINISTRATION      Patient here for allergy injection today.  Patient was presented with the opportunity to speak with provider and ask questions regarding allergy injections.  Patient was also instructed they need to wait 30 minutes after receiving allergy injections. All risks associated with potential adverse effects have been explained to patient and patient handout was provided following allergy testing.    After consent obtained/verified, allergy injection subcutaneously given in back of arm(s).  Please see below for specific details of site injections, concentration of serum, and volume injected.    VIAL COLOR OF ALL VIALS TODAY IS red 1:1. Vial allergy w/v concentration today is: 1:1     ALLERGY INJECTION FROM VIAL A GIVEN right  UPPER ARM IN THE AMOUNT OF 0.50 ML    ALLERGY INJECTION FROM VIAL B GIVEN left lower ARM IN THE AMOUNT OF 0.50  ML    ALLERGY INJECTION FROM VIAL C GIVEN leftupper ARM IN THE AMOUNT OF 0.50 ML      Documentation of vial injection specific to arm(s) noted on Allergy Immunotherapy Administration Form.       Patient waited 30 minutes for observation.No  Patient has received information and verbalizes understanding of the potential  health risks associated with allergy injections . Patient understands risks including anaphylaxis and chooses not to wait 30 minutes after administration of allergy injection(s).    Patient tolerated well without adverse reaction while the patient was in the office.    SHOT REACTION TREATMENT INSTRUCTIONS    During the 30 minute wait after an allergy injection the following symptoms should be reported:    Itching other than at the injection site  Hives or swelling other than at the injection site  Redness other than at the injection site  Difficulty breathing  Chest

## 2024-09-11 ENCOUNTER — NURSE ONLY (OUTPATIENT)
Dept: ALLERGY | Age: 49
End: 2024-09-11
Payer: COMMERCIAL

## 2024-09-11 VITALS
HEART RATE: 76 BPM | RESPIRATION RATE: 18 BRPM | DIASTOLIC BLOOD PRESSURE: 77 MMHG | OXYGEN SATURATION: 98 % | SYSTOLIC BLOOD PRESSURE: 118 MMHG

## 2024-09-11 DIAGNOSIS — J30.81 ALLERGIC RHINITIS DUE TO ANIMAL HAIR AND DANDER: ICD-10-CM

## 2024-09-11 DIAGNOSIS — J30.1 ALLERGIC RHINITIS DUE TO POLLEN, UNSPECIFIED SEASONALITY: ICD-10-CM

## 2024-09-11 DIAGNOSIS — J30.9 CHRONIC ALLERGIC RHINITIS: Primary | ICD-10-CM

## 2024-09-11 PROCEDURE — 95117 IMMUNOTHERAPY INJECTIONS: CPT | Performed by: NURSE PRACTITIONER

## 2024-09-26 ENCOUNTER — NURSE ONLY (OUTPATIENT)
Dept: ALLERGY | Age: 49
End: 2024-09-26
Payer: COMMERCIAL

## 2024-09-26 VITALS
SYSTOLIC BLOOD PRESSURE: 117 MMHG | DIASTOLIC BLOOD PRESSURE: 77 MMHG | HEART RATE: 76 BPM | OXYGEN SATURATION: 98 % | RESPIRATION RATE: 18 BRPM

## 2024-09-26 DIAGNOSIS — J30.9 CHRONIC ALLERGIC RHINITIS: Primary | ICD-10-CM

## 2024-09-26 DIAGNOSIS — J30.81 ALLERGIC RHINITIS DUE TO ANIMAL HAIR AND DANDER: ICD-10-CM

## 2024-09-26 DIAGNOSIS — J30.1 ALLERGIC RHINITIS DUE TO POLLEN, UNSPECIFIED SEASONALITY: ICD-10-CM

## 2024-09-26 PROCEDURE — 95117 IMMUNOTHERAPY INJECTIONS: CPT | Performed by: NURSE PRACTITIONER

## 2024-09-28 DIAGNOSIS — J33.9 NASAL POLYPS: ICD-10-CM

## 2024-09-30 RX ORDER — MONTELUKAST SODIUM 10 MG/1
10 TABLET ORAL NIGHTLY
Qty: 90 TABLET | Refills: 2 | Status: SHIPPED | OUTPATIENT
Start: 2024-09-30

## 2024-09-30 NOTE — TELEPHONE ENCOUNTER
REMIND PATIENTS TO REQUESTS MEDICATIONS DURING THEIR REGULAR OFFICE VISIT EVEN IF THE MEDICATION IS NOT DUE.  THIS SAVES TIME FOR THE PATIENT AND PROVIDER.      IF MEDICATIONS ARE NOT DUE AT THAT TIME, THE PHARMACY MAY HOLD THE PRESCRIPTION TO FILL AT A LATER DATE.    MAKE SURE THE CORRECT PHARMACY IS SELECTED WITH THE MEDICATION REFILL REQUESTS.  MEDICATIONS THAT ARE SENT TO A DIFFERENT PHARMACY WILL NEED TO BE TRANSFERRED BY THE PATIENT TO THE CORRECT PHARMACY.  PLEASE VERIFY THE CORRECT PHARMACY AND LINK THE PHARMACY TO THE REFILL REQUEST.    ____________________________________________________________________________________________________________________________________    Pharmacy requests the following medication to be refilled:      How often does patient take medication? DAILY    Patient is requesting 90 days of medication      Pharmacy (need the exact  pharmacy):     Patient last received medication on date: 4/2/24  Number of refills given at last fill: 1  (If refills are current the patient does not need another refill)    Last appointment: 4/1/24  IF THE PATIENT HAS NOT BEEN SEEN DURING THE LAST YEAR, THE MUST HAVE AN APPT TO BE SEEN AND I WILL ONLD SEND IN 90 DAYS ONE TIME.    Next appt : 4/7/25    DID THE PATIENT MISS THE LAST APPT AS A NO SHOW?  no.  IF THE PATIENT WAS A NO SHOW I WILL NOT FILL THE MEDICATION.

## 2024-10-08 ENCOUNTER — NURSE ONLY (OUTPATIENT)
Dept: ALLERGY | Age: 49
End: 2024-10-08
Payer: COMMERCIAL

## 2024-10-08 VITALS
SYSTOLIC BLOOD PRESSURE: 115 MMHG | OXYGEN SATURATION: 100 % | DIASTOLIC BLOOD PRESSURE: 83 MMHG | HEART RATE: 81 BPM | RESPIRATION RATE: 18 BRPM

## 2024-10-08 DIAGNOSIS — J30.9 CHRONIC ALLERGIC RHINITIS: Primary | ICD-10-CM

## 2024-10-08 DIAGNOSIS — J30.81 ALLERGIC RHINITIS DUE TO ANIMAL HAIR AND DANDER: ICD-10-CM

## 2024-10-08 DIAGNOSIS — J30.1 ALLERGIC RHINITIS DUE TO POLLEN, UNSPECIFIED SEASONALITY: ICD-10-CM

## 2024-10-08 PROCEDURE — 95117 IMMUNOTHERAPY INJECTIONS: CPT | Performed by: NURSE PRACTITIONER

## 2024-10-08 NOTE — PROGRESS NOTES

## 2024-10-24 ENCOUNTER — NURSE ONLY (OUTPATIENT)
Dept: ALLERGY | Age: 49
End: 2024-10-24
Payer: COMMERCIAL

## 2024-10-24 VITALS
DIASTOLIC BLOOD PRESSURE: 81 MMHG | OXYGEN SATURATION: 98 % | SYSTOLIC BLOOD PRESSURE: 121 MMHG | RESPIRATION RATE: 18 BRPM | HEART RATE: 71 BPM

## 2024-10-24 DIAGNOSIS — J30.9 CHRONIC ALLERGIC RHINITIS: Primary | ICD-10-CM

## 2024-10-24 DIAGNOSIS — J30.1 ALLERGIC RHINITIS DUE TO POLLEN, UNSPECIFIED SEASONALITY: ICD-10-CM

## 2024-10-24 DIAGNOSIS — J30.81 ALLERGIC RHINITIS DUE TO ANIMAL HAIR AND DANDER: ICD-10-CM

## 2024-10-24 PROCEDURE — 95117 IMMUNOTHERAPY INJECTIONS: CPT | Performed by: NURSE PRACTITIONER

## 2024-10-24 NOTE — PROGRESS NOTES
PATIENT HAS THE FOLLOWING DIAGNOSIS SUPPORTING ADMINISTRATION OF ALLERGY INJECTIONS: J30.1Allergic rhinitis due to pollen  AND 81345 MULTIPLE ALLERGY INJECTIONS FOR ALLERGY INJECTION ADMINISTRATION      Patient here for allergy injection today.  Patient was presented with the opportunity to speak with provider and ask questions regarding allergy injections.  Patient was also instructed they need to wait 30 minutes after receiving allergy injections. All risks associated with potential adverse effects have been explained to patient and patient handout was provided following allergy testing.    After consent obtained/verified, allergy injection subcutaneously given in back of arm(s).  Please see below for specific details of site injections, concentration of serum, and volume injected.    VIAL COLOR OF ALL VIALS TODAY IS red 1:1. Vial allergy w/v concentration today is: 1:1     ALLERGY INJECTION FROM VIAL A GIVEN right  UPPER ARM IN THE AMOUNT OF 0.50 ML    ALLERGY INJECTION FROM VIAL B GIVEN left lower ARM IN THE AMOUNT OF 0.50  ML    ALLERGY INJECTION FROM VIAL C GIVEN leftupper ARM IN THE AMOUNT OF 0.50 ML      Documentation of vial injection specific to arm(s) noted on Allergy Immunotherapy Administration Form.       Patient waited 30 minutes for observation.No  Patient has received information and verbalizes understanding of the potential  health risks associated with allergy injections . Patient understands risks including anaphylaxis and chooses not to wait 30 minutes after administration of allergy injection(s).    Patient tolerated well without adverse reaction while the patient was in the office.    SHOT REACTION TREATMENT INSTRUCTIONS    During the 30 minute wait after an allergy injection the following symptoms should be reported:    Itching other than at the injection site  Hives or swelling other than at the injection site  Redness other than at the injection site  Difficulty breathing  Chest

## 2024-11-07 ENCOUNTER — NURSE ONLY (OUTPATIENT)
Dept: ALLERGY | Age: 49
End: 2024-11-07
Payer: COMMERCIAL

## 2024-11-07 VITALS
DIASTOLIC BLOOD PRESSURE: 90 MMHG | SYSTOLIC BLOOD PRESSURE: 134 MMHG | OXYGEN SATURATION: 98 % | RESPIRATION RATE: 18 BRPM | HEART RATE: 79 BPM

## 2024-11-07 DIAGNOSIS — J30.81 ALLERGIC RHINITIS DUE TO ANIMAL HAIR AND DANDER: ICD-10-CM

## 2024-11-07 DIAGNOSIS — J30.9 CHRONIC ALLERGIC RHINITIS: Primary | ICD-10-CM

## 2024-11-07 DIAGNOSIS — J30.1 ALLERGIC RHINITIS DUE TO POLLEN, UNSPECIFIED SEASONALITY: ICD-10-CM

## 2024-11-07 PROCEDURE — 95117 IMMUNOTHERAPY INJECTIONS: CPT | Performed by: NURSE PRACTITIONER

## 2024-11-07 NOTE — PROGRESS NOTES
PATIENT HAS THE FOLLOWING DIAGNOSIS SUPPORTING ADMINISTRATION OF ALLERGY INJECTIONS: J30.1Allergic rhinitis due to pollen  AND 46709 MULTIPLE ALLERGY INJECTIONS FOR ALLERGY INJECTION ADMINISTRATION      Patient here for allergy injection today.  Patient was presented with the opportunity to speak with provider and ask questions regarding allergy injections.  Patient was also instructed they need to wait 30 minutes after receiving allergy injections. All risks associated with potential adverse effects have been explained to patient and patient handout was provided following allergy testing.    After consent obtained/verified, allergy injection subcutaneously given in back of arm(s).  Please see below for specific details of site injections, concentration of serum, and volume injected.    VIAL COLOR OF ALL VIALS TODAY IS red 1:1. Vial allergy w/v concentration today is: 1:1     ALLERGY INJECTION FROM VIAL A GIVEN left  UPPER ARM IN THE AMOUNT OF 0.50 ML    ALLERGY INJECTION FROM VIAL B GIVEN right lower ARM IN THE AMOUNT OF 0.50  ML    ALLERGY INJECTION FROM VIAL C GIVEN rightupper ARM IN THE AMOUNT OF 0.50 ML      Documentation of vial injection specific to arm(s) noted on Allergy Immunotherapy Administration Form.       Patient waited 30 minutes for observation.No  Patient has received information and verbalizes understanding of the potential  health risks associated with allergy injections . Patient understands risks including anaphylaxis and chooses not to wait 30 minutes after administration of allergy injection(s).    Patient tolerated well without adverse reaction while the patient was in the office.    SHOT REACTION TREATMENT INSTRUCTIONS    During the 30 minute wait after an allergy injection the following symptoms should be reported:    Itching other than at the injection site  Hives or swelling other than at the injection site  Redness other than at the injection site  Difficulty breathing  Chest

## 2024-12-02 ENCOUNTER — NURSE ONLY (OUTPATIENT)
Dept: ALLERGY | Age: 49
End: 2024-12-02
Payer: COMMERCIAL

## 2024-12-02 VITALS
DIASTOLIC BLOOD PRESSURE: 82 MMHG | OXYGEN SATURATION: 98 % | RESPIRATION RATE: 18 BRPM | SYSTOLIC BLOOD PRESSURE: 127 MMHG | HEART RATE: 82 BPM

## 2024-12-02 DIAGNOSIS — J30.1 ALLERGIC RHINITIS DUE TO POLLEN, UNSPECIFIED SEASONALITY: ICD-10-CM

## 2024-12-02 DIAGNOSIS — J30.9 CHRONIC ALLERGIC RHINITIS: Primary | ICD-10-CM

## 2024-12-02 PROCEDURE — 95117 IMMUNOTHERAPY INJECTIONS: CPT | Performed by: NURSE PRACTITIONER

## 2024-12-02 NOTE — PROGRESS NOTES
PATIENT HAS THE FOLLOWING DIAGNOSIS SUPPORTING ADMINISTRATION OF ALLERGY INJECTIONS: J30.1Allergic rhinitis due to pollen  AND 92451 MULTIPLE ALLERGY INJECTIONS FOR ALLERGY INJECTION ADMINISTRATION      Patient here for allergy injection today.  Patient was presented with the opportunity to speak with provider and ask questions regarding allergy injections.  Patient was also instructed they need to wait 30 minutes after receiving allergy injections. All risks associated with potential adverse effects have been explained to patient and patient handout was provided following allergy testing.    After consent obtained/verified, allergy injection subcutaneously given in back of arm(s).  Please see below for specific details of site injections, concentration of serum, and volume injected.    VIAL COLOR OF ALL VIALS TODAY IS red 1:1. Vial allergy w/v concentration today is: 1:1     ALLERGY INJECTION FROM VIAL A GIVEN right  UPPER ARM IN THE AMOUNT OF 0.50 ML    ALLERGY INJECTION FROM VIAL B GIVEN left upper ARM IN THE AMOUNT OF 0.50  ML    ALLERGY INJECTION FROM VIAL C GIVEN leftlower ARM IN THE AMOUNT OF 0.50 ML      Documentation of vial injection specific to arm(s) noted on Allergy Immunotherapy Administration Form.       Patient waited 30 minutes for observation.No  Patient has received information and verbalizes understanding of the potential  health risks associated with allergy injections . Patient understands risks including anaphylaxis and chooses not to wait 30 minutes after administration of allergy injection(s).    Patient tolerated well without adverse reaction while the patient was in the office.    SHOT REACTION TREATMENT INSTRUCTIONS    During the 30 minute wait after an allergy injection the following symptoms should be reported:    Itching other than at the injection site  Hives or swelling other than at the injection site  Redness other than at the injection site  Difficulty breathing  Chest

## 2024-12-24 ENCOUNTER — NURSE ONLY (OUTPATIENT)
Dept: ALLERGY | Age: 49
End: 2024-12-24
Payer: COMMERCIAL

## 2024-12-24 VITALS
HEART RATE: 80 BPM | SYSTOLIC BLOOD PRESSURE: 123 MMHG | RESPIRATION RATE: 18 BRPM | DIASTOLIC BLOOD PRESSURE: 87 MMHG | OXYGEN SATURATION: 98 %

## 2024-12-24 DIAGNOSIS — J30.1 ALLERGIC RHINITIS DUE TO POLLEN, UNSPECIFIED SEASONALITY: ICD-10-CM

## 2024-12-24 DIAGNOSIS — J30.9 CHRONIC ALLERGIC RHINITIS: Primary | ICD-10-CM

## 2024-12-24 PROCEDURE — 95117 IMMUNOTHERAPY INJECTIONS: CPT | Performed by: NURSE PRACTITIONER

## 2025-01-22 ENCOUNTER — HOSPITAL ENCOUNTER (OUTPATIENT)
Dept: WOMENS IMAGING | Age: 50
Discharge: HOME | End: 2025-01-22
Payer: COMMERCIAL

## 2025-01-22 VITALS — WEIGHT: 175 LBS | HEIGHT: 62 IN | BODY MASS INDEX: 32.2 KG/M2

## 2025-01-22 DIAGNOSIS — Z12.39 ENCOUNTER FOR BREAST CANCER SCREENING OTHER THAN MAMMOGRAM: ICD-10-CM

## 2025-01-22 DIAGNOSIS — Z12.31 ENCOUNTER FOR SCREENING MAMMOGRAM FOR MALIGNANT NEOPLASM OF BREAST: ICD-10-CM

## 2025-01-22 PROCEDURE — 77063 BREAST TOMOSYNTHESIS BI: CPT

## 2025-01-23 ENCOUNTER — NURSE ONLY (OUTPATIENT)
Dept: ALLERGY | Age: 50
End: 2025-01-23

## 2025-01-23 VITALS
SYSTOLIC BLOOD PRESSURE: 112 MMHG | DIASTOLIC BLOOD PRESSURE: 81 MMHG | OXYGEN SATURATION: 98 % | RESPIRATION RATE: 18 BRPM | HEART RATE: 83 BPM

## 2025-01-23 DIAGNOSIS — J30.9 CHRONIC ALLERGIC RHINITIS: Primary | ICD-10-CM

## 2025-01-23 DIAGNOSIS — J30.1 ALLERGIC RHINITIS DUE TO POLLEN, UNSPECIFIED SEASONALITY: ICD-10-CM

## 2025-01-23 NOTE — PROGRESS NOTES
PATIENT HAS THE FOLLOWING DIAGNOSIS SUPPORTING ADMINISTRATION OF ALLERGY INJECTIONS: J30.1Allergic rhinitis due to pollen  AND 14041 MULTIPLE ALLERGY INJECTIONS FOR ALLERGY INJECTION ADMINISTRATION      Patient here for allergy injection today.  Patient was presented with the opportunity to speak with provider and ask questions regarding allergy injections.  Patient was also instructed they need to wait 30 minutes after receiving allergy injections. All risks associated with potential adverse effects have been explained to patient and patient handout was provided following allergy testing.    After consent obtained/verified, allergy injection subcutaneously given in back of arm(s).  Please see below for specific details of site injections, concentration of serum, and volume injected.    VIAL COLOR OF ALL VIALS TODAY IS red 1:1. Vial allergy w/v concentration today is: 1:1     ALLERGY INJECTION FROM VIAL A GIVEN right  UPPER ARM IN THE AMOUNT OF 0.50 ML    ALLERGY INJECTION FROM VIAL B GIVEN left lower ARM IN THE AMOUNT OF 0.50  ML    ALLERGY INJECTION FROM VIAL C GIVEN leftupper ARM IN THE AMOUNT OF 0.50 ML      Documentation of vial injection specific to arm(s) noted on Allergy Immunotherapy Administration Form.       Patient waited 30 minutes for observation.No  Patient has received information and verbalizes understanding of the potential  health risks associated with allergy injections . Patient understands risks including anaphylaxis and chooses not to wait 30 minutes after administration of allergy injection(s).    Patient tolerated well without adverse reaction while the patient was in the office.    SHOT REACTION TREATMENT INSTRUCTIONS    During the 30 minute wait after an allergy injection the following symptoms should be reported:    Itching other than at the injection site  Hives or swelling other than at the injection site  Redness other than at the injection site  Difficulty breathing  Chest

## 2025-02-20 ENCOUNTER — NURSE ONLY (OUTPATIENT)
Dept: ALLERGY | Age: 50
End: 2025-02-20

## 2025-02-20 VITALS
DIASTOLIC BLOOD PRESSURE: 90 MMHG | RESPIRATION RATE: 18 BRPM | HEART RATE: 73 BPM | SYSTOLIC BLOOD PRESSURE: 120 MMHG | OXYGEN SATURATION: 98 %

## 2025-02-20 DIAGNOSIS — J30.1 ALLERGIC RHINITIS DUE TO POLLEN, UNSPECIFIED SEASONALITY: ICD-10-CM

## 2025-02-20 DIAGNOSIS — J30.9 CHRONIC ALLERGIC RHINITIS: Primary | ICD-10-CM

## 2025-02-20 NOTE — PROGRESS NOTES
PATIENT HAS THE FOLLOWING DIAGNOSIS SUPPORTING ADMINISTRATION OF ALLERGY INJECTIONS: J30.1Allergic rhinitis due to pollen  AND [id number] MULTIPLE ALLERGY INJECTIONS FOR ALLERGY INJECTION ADMINISTRATION      Patient here for allergy injection today.  Patient was presented with the opportunity to speak with provider and ask questions regarding allergy injections.  Patient was also instructed they need to wait 30 minutes after receiving allergy injections. All risks associated with potential adverse effects have been explained to patient and patient handout was provided following allergy testing.    After consent obtained/verified, allergy injection subcutaneously given in back of arm(s).  Please see below for specific details of site injections, concentration of serum, and volume injected.    VIAL COLOR OF ALL VIALS TODAY IS red 1:1. Vial allergy w/v concentration today is: 1:1     ALLERGY INJECTION FROM VIAL A GIVEN left  UPPER ARM IN THE AMOUNT OF 0.50 ML    ALLERGY INJECTION FROM VIAL B GIVEN right lower ARM IN THE AMOUNT OF 0.50  ML    ALLERGY INJECTION FROM VIAL C GIVEN rightupper ARM IN THE AMOUNT OF 0.50 ML      Documentation of vial injection specific to arm(s) noted on Allergy Immunotherapy Administration Form.       Patient waited 30 minutes for observation.No  Patient has received information and verbalizes understanding of the potential  health risks associated with allergy injections . Patient understands risks including anaphylaxis and chooses not to wait 30 minutes after administration of allergy injection(s).    Patient tolerated well without adverse reaction while the patient was in the office.    SHOT REACTION TREATMENT INSTRUCTIONS    During the 30 minute wait after an allergy injection the following symptoms should be reported:    Itching other than at the injection site  Hives or swelling other than at the injection site  Redness other than at the injection site  Difficulty breathing  Chest

## 2025-03-20 ENCOUNTER — CLINICAL SUPPORT (OUTPATIENT)
Dept: ALLERGY | Age: 50
End: 2025-03-20

## 2025-03-20 VITALS
SYSTOLIC BLOOD PRESSURE: 99 MMHG | RESPIRATION RATE: 18 BRPM | DIASTOLIC BLOOD PRESSURE: 74 MMHG | HEART RATE: 88 BPM | OXYGEN SATURATION: 98 %

## 2025-03-20 DIAGNOSIS — J30.1 ALLERGIC RHINITIS DUE TO POLLEN, UNSPECIFIED SEASONALITY: ICD-10-CM

## 2025-03-20 DIAGNOSIS — J30.9 CHRONIC ALLERGIC RHINITIS: Primary | ICD-10-CM

## 2025-03-20 NOTE — PROGRESS NOTES
PATIENT HAS THE FOLLOWING DIAGNOSIS SUPPORTING ADMINISTRATION OF ALLERGY INJECTIONS: J30.1Allergic rhinitis due to pollen  AND 39546 MULTIPLE ALLERGY INJECTIONS FOR ALLERGY INJECTION ADMINISTRATION      Patient here for allergy injection today.  Patient was presented with the opportunity to speak with provider and ask questions regarding allergy injections.  Patient was also instructed they need to wait 30 minutes after receiving allergy injections. All risks associated with potential adverse effects have been explained to patient and patient handout was provided following allergy testing.    After consent obtained/verified, allergy injection subcutaneously given in back of arm(s).  Please see below for specific details of site injections, concentration of serum, and volume injected.    VIAL COLOR OF ALL VIALS TODAY IS red 1:1. Vial allergy w/v concentration today is: 1:10,000     ALLERGY INJECTION FROM VIAL A GIVEN right  UPPER ARM IN THE AMOUNT OF 0.50 ML    ALLERGY INJECTION FROM VIAL B GIVEN left upper ARM IN THE AMOUNT OF 0.50  ML    ALLERGY INJECTION FROM VIAL C GIVEN leftlower ARM IN THE AMOUNT OF 0.50 ML      Documentation of vial injection specific to arm(s) noted on Allergy Immunotherapy Administration Form.       Patient waited 30 minutes for observation.No  Patient has received information and verbalizes understanding of the potential  health risks associated with allergy injections . Patient understands risks including anaphylaxis and chooses not to wait 30 minutes after administration of allergy injection(s).    Patient tolerated well without adverse reaction while the patient was in the office.    SHOT REACTION TREATMENT INSTRUCTIONS    During the 30 minute wait after an allergy injection the following symptoms should be reported:    Itching other than at the injection site  Hives or swelling other than at the injection site  Redness other than at the injection site  Difficulty breathing  Chest

## 2025-03-26 RX ORDER — DUPILUMAB 300 MG/2ML
INJECTION, SOLUTION SUBCUTANEOUS
Qty: 4 ML | OUTPATIENT
Start: 2025-03-26

## 2025-04-07 ENCOUNTER — OFFICE VISIT (OUTPATIENT)
Dept: ALLERGY | Age: 50
End: 2025-04-07
Payer: COMMERCIAL

## 2025-04-07 VITALS
HEIGHT: 63 IN | OXYGEN SATURATION: 98 % | SYSTOLIC BLOOD PRESSURE: 133 MMHG | WEIGHT: 175 LBS | DIASTOLIC BLOOD PRESSURE: 69 MMHG | RESPIRATION RATE: 18 BRPM | BODY MASS INDEX: 31.01 KG/M2 | HEART RATE: 83 BPM

## 2025-04-07 DIAGNOSIS — J30.81 ALLERGY TO CATS: ICD-10-CM

## 2025-04-07 DIAGNOSIS — H10.10 ALLERGIC RHINOCONJUNCTIVITIS: ICD-10-CM

## 2025-04-07 DIAGNOSIS — J30.1 ALLERGY TO TREES: ICD-10-CM

## 2025-04-07 DIAGNOSIS — Z91.048 ALLERGY TO MOLD: ICD-10-CM

## 2025-04-07 DIAGNOSIS — J30.81 ALLERGIC RHINITIS DUE TO ANIMAL HAIR AND DANDER: ICD-10-CM

## 2025-04-07 DIAGNOSIS — J32.4 CHRONIC PANSINUSITIS: ICD-10-CM

## 2025-04-07 DIAGNOSIS — Z29.89 IMMUNOTHERAPY: ICD-10-CM

## 2025-04-07 DIAGNOSIS — J30.1 ALLERGIC RHINITIS DUE TO POLLEN, UNSPECIFIED SEASONALITY: ICD-10-CM

## 2025-04-07 DIAGNOSIS — J30.9 CHRONIC ALLERGIC RHINITIS: ICD-10-CM

## 2025-04-07 DIAGNOSIS — J33.9 NASAL POLYPS: Primary | ICD-10-CM

## 2025-04-07 DIAGNOSIS — J30.9 ALLERGIC RHINOCONJUNCTIVITIS: ICD-10-CM

## 2025-04-07 PROCEDURE — 99214 OFFICE O/P EST MOD 30 MIN: CPT | Performed by: NURSE PRACTITIONER

## 2025-04-07 RX ORDER — FLUTICASONE PROPIONATE 50 MCG
2 SPRAY, SUSPENSION (ML) NASAL DAILY PRN
COMMUNITY

## 2025-04-07 RX ORDER — CETIRIZINE HYDROCHLORIDE 10 MG/1
10 TABLET ORAL NIGHTLY PRN
Qty: 30 TABLET | Refills: 11 | Status: SHIPPED | OUTPATIENT
Start: 2025-04-07

## 2025-04-07 RX ORDER — NACL/NAHCO3/HYALURON SOD/ALOE 0.9 %
2 GEL (GRAM) NASAL 2 TIMES DAILY PRN
COMMUNITY
Start: 2025-04-07

## 2025-04-07 NOTE — PROGRESS NOTES
@[facility]@    Allergy & Asthma   [address] [place] [address]  Ph:   [phone]  Fax:[phone]     Provider:  Dr. Genesis Aguilar    Follow Up         Lashae was seen today for follow-up, medication refill and nasal polyps.    Diagnoses and all orders for this visit:    Nasal polyps  -     dupilumab (DUPIXENT) 300 MG/2ML SOAJ injection; Inject 2 mLs into the skin every 14 days For nasal polyps    Chronic allergic rhinitis    Immunotherapy    Allergic rhinitis due to animal hair and dander    Allergic rhinitis due to pollen, unspecified seasonality    Chronic pansinusitis    Allergy to cats    Allergy to mold    Allergy to trees    Allergic rhinoconjunctivitis    Other orders  -     Saline (NASOGEL) GEL; 2 sprays by Nasal route 2 times daily as needed (dry nose)  -     cetirizine (ZYRTEC) 10 MG tablet; Take 1 tablet by mouth nightly as needed for Allergies        CHIEF COMPLAINT:   Chief Complaint   Patient presents with    Follow-up     1 year f/u allergy injections/allergy mangement    Medication Refill    Nasal Polyps     Dupixent       HISTORY OF PRESENT ILLNESS: ESTABLISHED PATIENT HERE FOR EVALUATION   Lashae PERSAUD [place] is a 49 y.o. female is here for immunotherapy follow-up. Patient has been in red color vial  once EVERY 4 WEEKS.  Immunotherapy treatment has not been changed since starting date..  There have been  no local reactions to immunotherapy injections. There have been no systemic reactions to IT. On immunotherapy, patient has had a 95 reduction in allergy symptoms. Since beginning immunotherapy no new allergy symptoms have developed.  She does not suspect any new allergen sensitization.  During the last year patient denies starting on a beta-blocker medication. However, patient understands to report on any medication and beta-blockers if they should be placed on this type of medication.  They understand it does interfere with allergy injections. She would like to continue immunotherapy.

## 2025-04-17 ENCOUNTER — CLINICAL SUPPORT (OUTPATIENT)
Dept: ALLERGY | Age: 50
End: 2025-04-17

## 2025-04-17 VITALS
RESPIRATION RATE: 18 BRPM | DIASTOLIC BLOOD PRESSURE: 76 MMHG | OXYGEN SATURATION: 98 % | SYSTOLIC BLOOD PRESSURE: 118 MMHG | HEART RATE: 85 BPM

## 2025-04-17 DIAGNOSIS — J30.9 CHRONIC ALLERGIC RHINITIS: Primary | ICD-10-CM

## 2025-04-17 DIAGNOSIS — J30.1 ALLERGIC RHINITIS DUE TO POLLEN, UNSPECIFIED SEASONALITY: ICD-10-CM

## 2025-04-17 NOTE — PROGRESS NOTES

## 2025-05-07 DIAGNOSIS — J30.9 CHRONIC ALLERGIC RHINITIS: Primary | ICD-10-CM

## 2025-05-07 DIAGNOSIS — Z29.89 IMMUNOTHERAPY: ICD-10-CM

## 2025-05-07 PROCEDURE — 95165 ANTIGEN THERAPY SERVICES: CPT | Performed by: NURSE PRACTITIONER

## 2025-05-07 NOTE — PROGRESS NOTES
TYPE OF INSURANCE: COMMERCIAL  PT'S CURRENT VIALS . NEW VIALS MIXED  ALLERGY EXTRACT MIXING.    DATE OF MIXING= Today   TOTAL NUMBER OF SET OF VIALS= 3  TOTAL VIALS PREPARED = 3  TOTAL INJECTABLE DOSES =   20 INJECTIONS PER SET  TOTAL ANTICIPATED DOSES = 60 INJECTIONS   TOTAL NUMBER OF INJECTIONS BILLED TODAY = 60       An allergy extract was prepared according to written prescription by provider.  Provider onsite during allergy extract preparation. Patient vial(s) include the following:     RED VIAL - 1:1 CONCENTRATION - EXPIRES 12 MONTHS  YELLOW VIAL-1:10 CONCENTRATION - EXPIRES 12 MONTHS  BLUE VIAL-1:100 CONCENTRATION - EXPIRES 12 MONTHS  GREEN VIAL-1:1,000 CONCENTRATION - EXPIRES 6 MONTHS  SILVER VIAL-1:10,000 CONCENTRATION - EXPIRES 6 MONTHS    Allergy extract was prepared by the following method:   RED TO YELLOW:  Once the  one-to-one concentration was prepared in the red vial, 0.5 ML's was then extracted in place into the yellow vial to achieve a 1:10 concentration.    YELLOW TO BLUE:  Then 0.5 ML's was extracted from the yellow vial and placed into the blue file with dilutant to achieve a 1:100 concentration.    BLUE TO GREEN:  Then 0.5 ML's was extracted from the blue vial and placed into Green vial with dilute to achieve a 1:1,000 concentration.    GREEN TO SILVER:  Then 0.5 ML's was taken from the green vial and placed in the Silver vial with dilutant to achieve a 1:10,000 concentration.      Patient vials were labeled with name, date-of-birth, vial (A,B,or C), concentration, and expiration.    When injecting from a new  vial the first injections is 0.05 ml and are increased by 0.05 increments until 0.5ml from the vial is achieved or the patient reaches maximum tolerated dose.   Injections are given once ot three times weekly and at least 24 hours apart, according to provider orders.   If patient has complications or \"knots\" or maximum tolerance the dose building is reduced, stopped, or maintained

## 2025-05-13 ENCOUNTER — CLINICAL SUPPORT (OUTPATIENT)
Dept: ALLERGY | Age: 50
End: 2025-05-13
Payer: COMMERCIAL

## 2025-05-13 VITALS
SYSTOLIC BLOOD PRESSURE: 133 MMHG | OXYGEN SATURATION: 98 % | HEART RATE: 86 BPM | DIASTOLIC BLOOD PRESSURE: 79 MMHG | RESPIRATION RATE: 18 BRPM

## 2025-05-13 DIAGNOSIS — J30.9 CHRONIC ALLERGIC RHINITIS: Primary | ICD-10-CM

## 2025-05-13 DIAGNOSIS — J30.1 ALLERGIC RHINITIS DUE TO POLLEN, UNSPECIFIED SEASONALITY: ICD-10-CM

## 2025-05-13 PROCEDURE — 95117 IMMUNOTHERAPY INJECTIONS: CPT | Performed by: NURSE PRACTITIONER

## 2025-05-13 NOTE — PROGRESS NOTES
PATIENT HAS THE FOLLOWING DIAGNOSIS SUPPORTING ADMINISTRATION OF ALLERGY INJECTIONS: J30.1Allergic rhinitis due to pollen  AND 95130 MULTIPLE ALLERGY INJECTIONS FOR ALLERGY INJECTION ADMINISTRATION      Patient here for allergy injection today.  Patient was presented with the opportunity to speak with provider and ask questions regarding allergy injections.  Patient was also instructed they need to wait 30 minutes after receiving allergy injections. All risks associated with potential adverse effects have been explained to patient and patient handout was provided following allergy testing.    After consent obtained/verified, allergy injection subcutaneously given in back of arm(s).  Please see below for specific details of site injections, concentration of serum, and volume injected.    VIAL COLOR OF ALL VIALS TODAY IS red 1:1. Vial allergy w/v concentration today is: 1:1     ALLERGY INJECTION FROM VIAL A GIVEN right  UPPER ARM IN THE AMOUNT OF 0.50 ML    ALLERGY INJECTION FROM VIAL B GIVEN left lower ARM IN THE AMOUNT OF 0.50  ML    ALLERGY INJECTION FROM VIAL C GIVEN leftupper ARM IN THE AMOUNT OF 0.50 ML      Documentation of vial injection specific to arm(s) noted on Allergy Immunotherapy Administration Form.       Patient waited 30 minutes for observation.No  Patient has received information and verbalizes understanding of the potential  health risks associated with allergy injections . Patient understands risks including anaphylaxis and chooses not to wait 30 minutes after administration of allergy injection(s).    Patient tolerated well without adverse reaction while the patient was in the office.    SHOT REACTION TREATMENT INSTRUCTIONS    During the 30 minute wait after an allergy injection the following symptoms should be reported:    Itching other than at the injection site  Hives or swelling other than at the injection site  Redness other than at the injection site  Difficulty breathing  Chest

## 2025-05-19 ENCOUNTER — TELEPHONE (OUTPATIENT)
Dept: FAMILY MEDICINE CLINIC | Age: 50
End: 2025-05-19

## 2025-05-19 RX ORDER — CEFDINIR 300 MG/1
300 CAPSULE ORAL 2 TIMES DAILY
Qty: 20 CAPSULE | Refills: 0 | Status: SHIPPED | OUTPATIENT
Start: 2025-05-19 | End: 2025-05-29

## 2025-05-19 NOTE — TELEPHONE ENCOUNTER
Lashae called and has not been feeling well:    Symptoms: Sinus Pressure, cough, yellowish green mucus, post-nasal drip, chest and head congestion  When did Symptoms start: 05/13/25  Taking any OTC Medications to help Symptoms: DayQuil, NiteQuil, Mucinex  Covid Test: Yes on 05/15/25 Negative  Are you able to do a Virtual Appointment if your PCP Recommends that: Yes     Pharmacy: [facility] on [address]    Lashae may be reached reached at [phone]

## 2025-05-19 NOTE — TELEPHONE ENCOUNTER
Date of last visit:  3/4/2024  Date of next visit:  Visit date not found    Requested Prescriptions     Signed Prescriptions Disp Refills    cefdinir (OMNICEF) 300 MG capsule 20 capsule 0     Sig: Take 1 capsule by mouth 2 times daily for 10 days     Authorizing Provider: EUSEBIO LUNSFORD     Ordering User: OPAL SANTIZO

## 2025-05-27 ENCOUNTER — TELEPHONE (OUTPATIENT)
Dept: ALLERGY | Age: 50
End: 2025-05-27

## 2025-06-16 ENCOUNTER — CLINICAL SUPPORT (OUTPATIENT)
Dept: ALLERGY | Age: 50
End: 2025-06-16
Payer: COMMERCIAL

## 2025-06-16 VITALS — SYSTOLIC BLOOD PRESSURE: 121 MMHG | DIASTOLIC BLOOD PRESSURE: 84 MMHG | OXYGEN SATURATION: 96 % | HEART RATE: 78 BPM

## 2025-06-16 DIAGNOSIS — J30.9 CHRONIC ALLERGIC RHINITIS: Primary | ICD-10-CM

## 2025-06-16 DIAGNOSIS — J30.1 ALLERGIC RHINITIS DUE TO POLLEN, UNSPECIFIED SEASONALITY: ICD-10-CM

## 2025-06-16 PROCEDURE — 95117 IMMUNOTHERAPY INJECTIONS: CPT | Performed by: NURSE PRACTITIONER

## 2025-06-16 RX ORDER — PROGESTERONE 100 MG/1
CAPSULE ORAL
COMMUNITY
Start: 2025-06-04

## 2025-06-16 RX ORDER — ESTRADIOL 0.04 MG/D
PATCH, EXTENDED RELEASE TRANSDERMAL
COMMUNITY
Start: 2025-06-05

## 2025-06-16 NOTE — PROGRESS NOTES
Discharge you I am garrick osuna PATIENT HAS THE FOLLOWING DIAGNOSIS SUPPORTING ADMINISTRATION OF ALLERGY INJECTIONS: J30.1Allergic rhinitis due to pollen  AND 74096 MULTIPLE ALLERGY INJECTIONS FOR ALLERGY INJECTION ADMINISTRATION      Patient here for allergy injection today.  Patient was presented with the opportunity to speak with provider and ask questions regarding allergy injections.  Patient was also instructed they need to wait 30 minutes after receiving allergy injections. All risks associated with potential adverse effects have been explained to patient and patient handout was provided following allergy testing.    After consent obtained/verified, allergy injection subcutaneously given in back of arm(s).  Please see below for specific details of site injections, concentration of serum, and volume injected.    VIAL COLOR OF ALL VIALS TODAY IS red 1:1 concentration    ALLERGY INJECTION FROM VIAL A GIVEN left  UPPER ARM IN THE AMOUNT OF 0.35 ML    ALLERGY INJECTION FROM VIAL B GIVEN right lower ARM IN THE AMOUNT OF 0.35  ML    ALLERGY INJECTION FROM VIAL C GIVEN rightupper ARM IN THE AMOUNT OF 0.35 ML    Documentation of vial injection specific to arm(s) noted on Allergy Immunotherapy Administration Form.       Patient waited 30 minutes for observation.No  Patient has received information and verbalizes understanding of the potential  health risks associated with allergy injections . Patient understands risks including anaphylaxis and chooses not to wait 30 minutes after administration of allergy injection(s).    Patient tolerated well without adverse reaction while the patient was in the office.    SHOT REACTION TREATMENT INSTRUCTIONS    During the 30 minute wait after an allergy injection the following symptoms should be reported:    Itching other than at the injection site  Hives or swelling other than at the injection site  Redness other than at the injection site  Difficulty breathing  Chest

## 2025-07-11 ENCOUNTER — TRANSCRIBE ORDERS (OUTPATIENT)
Dept: ADMINISTRATIVE | Age: 50
End: 2025-07-11

## 2025-07-11 DIAGNOSIS — R92.8 OTHER ABNORMAL AND INCONCLUSIVE FINDINGS ON DIAGNOSTIC IMAGING OF BREAST: Primary | ICD-10-CM

## 2025-07-21 ENCOUNTER — CLINICAL SUPPORT (OUTPATIENT)
Dept: ALLERGY | Age: 50
End: 2025-07-21
Payer: COMMERCIAL

## 2025-07-21 VITALS
RESPIRATION RATE: 18 BRPM | OXYGEN SATURATION: 98 % | SYSTOLIC BLOOD PRESSURE: 132 MMHG | HEART RATE: 76 BPM | DIASTOLIC BLOOD PRESSURE: 85 MMHG

## 2025-07-21 DIAGNOSIS — J30.1 ALLERGIC RHINITIS DUE TO POLLEN, UNSPECIFIED SEASONALITY: ICD-10-CM

## 2025-07-21 DIAGNOSIS — J30.9 CHRONIC ALLERGIC RHINITIS: Primary | ICD-10-CM

## 2025-07-21 PROCEDURE — 95117 IMMUNOTHERAPY INJECTIONS: CPT | Performed by: NURSE PRACTITIONER

## 2025-07-21 NOTE — PROGRESS NOTES
PATIENT HAS THE FOLLOWING DIAGNOSIS SUPPORTING ADMINISTRATION OF ALLERGY INJECTIONS: J30.1Allergic rhinitis due to pollen  AND 59292 MULTIPLE ALLERGY INJECTIONS FOR ALLERGY INJECTION ADMINISTRATION      Patient here for allergy injection today.  Patient was presented with the opportunity to speak with provider and ask questions regarding allergy injections.  Patient was also instructed they need to wait 30 minutes after receiving allergy injections. All risks associated with potential adverse effects have been explained to patient and patient handout was provided following allergy testing.    After consent obtained/verified, allergy injection subcutaneously given in back of arm(s).  Please see below for specific details of site injections, concentration of serum, and volume injected.    VIAL COLOR OF ALL VIALS TODAY IS red 1:1 concentration    ALLERGY INJECTION FROM VIAL A GIVEN right  UPPER ARM IN THE AMOUNT OF 0.35 ML    ALLERGY INJECTION FROM VIAL B GIVEN left upper ARM IN THE AMOUNT OF 0.35  ML    ALLERGY INJECTION FROM VIAL C GIVEN leftlower ARM IN THE AMOUNT OF 0.35 ML    Documentation of vial injection specific to arm(s) noted on Allergy Immunotherapy Administration Form.       Patient waited 30 minutes for observation.No  Patient has received information and verbalizes understanding of the potential  health risks associated with allergy injections . Patient understands risks including anaphylaxis and chooses not to wait 30 minutes after administration of allergy injection(s).    Patient tolerated well without adverse reaction while the patient was in the office.    SHOT REACTION TREATMENT INSTRUCTIONS    During the 30 minute wait after an allergy injection the following symptoms should be reported:    Itching other than at the injection site  Hives or swelling other than at the injection site  Redness other than at the injection site  Difficulty breathing  Chest tightness  Difficulty swallowing  Throat

## 2025-08-18 ENCOUNTER — CLINICAL SUPPORT (OUTPATIENT)
Dept: ALLERGY | Age: 50
End: 2025-08-18
Payer: COMMERCIAL

## 2025-08-18 VITALS
RESPIRATION RATE: 16 BRPM | OXYGEN SATURATION: 98 % | SYSTOLIC BLOOD PRESSURE: 123 MMHG | HEART RATE: 77 BPM | DIASTOLIC BLOOD PRESSURE: 81 MMHG

## 2025-08-18 DIAGNOSIS — J30.1 ALLERGIC RHINITIS DUE TO POLLEN, UNSPECIFIED SEASONALITY: ICD-10-CM

## 2025-08-18 DIAGNOSIS — J30.9 CHRONIC ALLERGIC RHINITIS: Primary | ICD-10-CM

## 2025-08-18 PROCEDURE — 95117 IMMUNOTHERAPY INJECTIONS: CPT | Performed by: NURSE PRACTITIONER

## 2025-08-20 ENCOUNTER — OFFICE VISIT (OUTPATIENT)
Dept: FAMILY MEDICINE CLINIC | Age: 50
End: 2025-08-20

## 2025-08-20 VITALS
SYSTOLIC BLOOD PRESSURE: 118 MMHG | BODY MASS INDEX: 31.45 KG/M2 | HEART RATE: 80 BPM | RESPIRATION RATE: 16 BRPM | DIASTOLIC BLOOD PRESSURE: 70 MMHG | HEIGHT: 63 IN | WEIGHT: 177.5 LBS

## 2025-08-20 DIAGNOSIS — R42 VERTIGO: ICD-10-CM

## 2025-08-20 DIAGNOSIS — J01.20 ACUTE NON-RECURRENT ETHMOIDAL SINUSITIS: Primary | ICD-10-CM

## 2025-08-20 DIAGNOSIS — J30.2 SEASONAL ALLERGIES: ICD-10-CM

## 2025-08-20 DIAGNOSIS — H69.92 ACUTE DYSFUNCTION OF LEFT EUSTACHIAN TUBE: ICD-10-CM

## 2025-08-20 RX ORDER — MECLIZINE HYDROCHLORIDE 25 MG/1
25 TABLET ORAL 4 TIMES DAILY PRN
Qty: 40 TABLET | Refills: 1 | Status: SHIPPED | OUTPATIENT
Start: 2025-08-20

## 2025-08-20 SDOH — ECONOMIC STABILITY: FOOD INSECURITY: WITHIN THE PAST 12 MONTHS, THE FOOD YOU BOUGHT JUST DIDN'T LAST AND YOU DIDN'T HAVE MONEY TO GET MORE.: NEVER TRUE

## 2025-08-20 SDOH — ECONOMIC STABILITY: FOOD INSECURITY: WITHIN THE PAST 12 MONTHS, YOU WORRIED THAT YOUR FOOD WOULD RUN OUT BEFORE YOU GOT MONEY TO BUY MORE.: NEVER TRUE

## 2025-08-20 ASSESSMENT — ENCOUNTER SYMPTOMS
SHORTNESS OF BREATH: 0
SORE THROAT: 0
COUGH: 0
ABDOMINAL PAIN: 0
EYE PAIN: 0
CONSTIPATION: 0
CHEST TIGHTNESS: 0
NAUSEA: 0
WHEEZING: 0

## 2025-08-20 ASSESSMENT — PATIENT HEALTH QUESTIONNAIRE - PHQ9
2. FEELING DOWN, DEPRESSED OR HOPELESS: NOT AT ALL
SUM OF ALL RESPONSES TO PHQ QUESTIONS 1-9: 0
1. LITTLE INTEREST OR PLEASURE IN DOING THINGS: NOT AT ALL
SUM OF ALL RESPONSES TO PHQ QUESTIONS 1-9: 0

## (undated) DEVICE — DRAPE,SPLIT ,77X120: Brand: MEDLINE

## (undated) DEVICE — KIT,ANTI FOG,W/SPONGE & FLUID,SOFT PACK: Brand: MEDLINE

## (undated) DEVICE — PAD,NON-ADHERENT,3X8,STERILE,LF,1/PK: Brand: MEDLINE

## (undated) DEVICE — GOWN,SIRUS,NONRNF,SETINSLV,XL,20/CS: Brand: MEDLINE

## (undated) DEVICE — DRESSING TRNSPAR W2XL2.75IN FLM SHT SEMIPERMEABLE WIND

## (undated) DEVICE — GAUZE,SPONGE,8"X4",12PLY,XRAY,STRL,LF: Brand: MEDLINE

## (undated) DEVICE — 3M™ STERI-STRIP™ COMPOUND BENZOIN TINCTURE 40 BAGS/CARTON 4 CARTONS/CASE C1544: Brand: 3M™ STERI-STRIP™

## (undated) DEVICE — PACK PROCEDURE SURG SET UP SRMC

## (undated) DEVICE — BLADE 1884012EM RAD12 4MM M4 ROTATE ROHS: Brand: FUSION®

## (undated) DEVICE — BASIC SINGLE BASIN BTC-LF: Brand: MEDLINE INDUSTRIES, INC.

## (undated) DEVICE — SILICONE DUAL LUMEN STOMACH TUBE,ANTI-REFLUX VALVE AND RADIOPAQUE LINE: Brand: SALEM SUMP

## (undated) DEVICE — MICRO TIP WIPE: Brand: DEVON

## (undated) DEVICE — TUBING 1895522 5PK STRAIGHTSHOT TO XPS: Brand: STRAIGHTSHOT®

## (undated) DEVICE — PACK-MAJOR

## (undated) DEVICE — INSTRUMENT TRACKER 9733533XOM ENT 1PK

## (undated) DEVICE — SPONGE GZ W4XL4IN COT 12 PLY TYP VII WVN C FLD DSGN

## (undated) DEVICE — TUBING, SUCTION, 1/4" X 20', STRAIGHT: Brand: MEDLINE INDUSTRIES, INC.

## (undated) DEVICE — BLADE 1884080EM TRICUT 4MMX13CM M4 ROHS: Brand: FUSION®

## (undated) DEVICE — 1010 S-DRAPE TOWEL DRAPE 10/BX: Brand: STERI-DRAPE™

## (undated) DEVICE — PATIENT TRACKER 9734887XOM NON-INVASIVE

## (undated) DEVICE — Z INACTIVE USE 2735373 APPLICATOR FBR LAIN COT WOOD TIP ECONOMICAL

## (undated) DEVICE — SYRINGE IRRIG 60ML SFT PLIABLE BLB EZ TO GRP 1 HND USE W/

## (undated) DEVICE — COAGULATOR SUCT 8FR L6IN HNDL FOR ENT PROC

## (undated) DEVICE — DRAPE,EENT,SPLIT,STERILE: Brand: MEDLINE

## (undated) DEVICE — COAGULATOR SUCT 10FR L6IN HND FT SWCH VALLEYLAB

## (undated) DEVICE — CODMAN® SURGICAL PATTIES 1/2" X 3" (1.27CM X 7.62CM): Brand: CODMAN®

## (undated) DEVICE — GLOVE SURG SZ 75 L12IN FNGR THK94MIL TRNSLUC YEL LTX

## (undated) DEVICE — SYRINGE MED 10ML TRNSLUC BRL PLUNG BLK MRK POLYPR CTRL

## (undated) DEVICE — UNIVERSAL MONOPOLAR LAPAROSCOPIC CABLE 10FT, 4MM PIN CONNECTOR: Brand: CONMED

## (undated) DEVICE — TAPE ADH W1INXL10YD PLAS TRNSPAR H2O RESIST HYPOALRG CURAD

## (undated) DEVICE — CORD,CAUTERY,BIPOLAR,STERILE: Brand: MEDLINE